# Patient Record
Sex: MALE | Race: WHITE | NOT HISPANIC OR LATINO | Employment: OTHER | URBAN - METROPOLITAN AREA
[De-identification: names, ages, dates, MRNs, and addresses within clinical notes are randomized per-mention and may not be internally consistent; named-entity substitution may affect disease eponyms.]

---

## 2017-01-04 ENCOUNTER — PATIENT MESSAGE (OUTPATIENT)
Dept: UROLOGY | Facility: CLINIC | Age: 62
End: 2017-01-04

## 2017-02-20 ENCOUNTER — LAB VISIT (OUTPATIENT)
Dept: LAB | Facility: HOSPITAL | Age: 62
End: 2017-02-20
Attending: UROLOGY
Payer: COMMERCIAL

## 2017-02-20 DIAGNOSIS — C61 PROSTATE CANCER: ICD-10-CM

## 2017-02-20 LAB — COMPLEXED PSA SERPL-MCNC: 1.4 NG/ML

## 2017-02-20 PROCEDURE — 36415 COLL VENOUS BLD VENIPUNCTURE: CPT

## 2017-02-20 PROCEDURE — 84153 ASSAY OF PSA TOTAL: CPT

## 2017-02-21 ENCOUNTER — PATIENT MESSAGE (OUTPATIENT)
Dept: UROLOGY | Facility: CLINIC | Age: 62
End: 2017-02-21

## 2017-03-08 ENCOUNTER — OFFICE VISIT (OUTPATIENT)
Dept: UROLOGY | Facility: CLINIC | Age: 62
End: 2017-03-08
Payer: COMMERCIAL

## 2017-03-08 VITALS
DIASTOLIC BLOOD PRESSURE: 80 MMHG | TEMPERATURE: 98 F | BODY MASS INDEX: 26.36 KG/M2 | SYSTOLIC BLOOD PRESSURE: 124 MMHG | WEIGHT: 178 LBS | HEIGHT: 69 IN | HEART RATE: 60 BPM

## 2017-03-08 DIAGNOSIS — Z85.46 HISTORY OF PROSTATE CANCER: Primary | ICD-10-CM

## 2017-03-08 LAB
BILIRUB SERPL-MCNC: NORMAL MG/DL
BLOOD URINE, POC: NORMAL
COLOR, POC UA: NORMAL
GLUCOSE UR QL STRIP: NORMAL
KETONES UR QL STRIP: NORMAL
LEUKOCYTE ESTERASE URINE, POC: NORMAL
NITRITE, POC UA: NORMAL
PH, POC UA: 8
PROTEIN, POC: NORMAL
SPECIFIC GRAVITY, POC UA: 1.01
UROBILINOGEN, POC UA: NORMAL

## 2017-03-08 PROCEDURE — 3079F DIAST BP 80-89 MM HG: CPT | Mod: S$GLB,,, | Performed by: UROLOGY

## 2017-03-08 PROCEDURE — 99999 PR PBB SHADOW E&M-EST. PATIENT-LVL III: CPT | Mod: PBBFAC,,, | Performed by: UROLOGY

## 2017-03-08 PROCEDURE — 1160F RVW MEDS BY RX/DR IN RCRD: CPT | Mod: S$GLB,,, | Performed by: UROLOGY

## 2017-03-08 PROCEDURE — 3074F SYST BP LT 130 MM HG: CPT | Mod: S$GLB,,, | Performed by: UROLOGY

## 2017-03-08 PROCEDURE — 99213 OFFICE O/P EST LOW 20 MIN: CPT | Mod: 25,S$GLB,, | Performed by: UROLOGY

## 2017-03-08 PROCEDURE — 81002 URINALYSIS NONAUTO W/O SCOPE: CPT | Mod: S$GLB,,, | Performed by: UROLOGY

## 2017-03-08 NOTE — MR AVS SNAPSHOT
"    [facility]   [address]  [place] [address]  Phone: [phone]                  Edwin Sepulveda III   3/8/2017 9:45 AM   Office Visit    Description:  Male : 1955   Provider:  Adin Santos MD   Department:  [place] [facility]           Reason for Visit     6 mth recheck                To Do List           Future Appointments        Provider Department Dept Phone    2017 8:30 AM [phone]      Goals (5 Years of Data)     None      [facility]     [facility] Care [facility] -  Assistance  Registered nurses in the [facility] Center provide clinical advisement, health education, appointment booking, and other advisory services.  Call for this free service at [phone].             Medications           Message regarding Medications     Verify the changes and/or additions to your medication regime listed below are the same as discussed with your clinician today.  If any of these changes or additions are incorrect, please notify your healthcare provider.             Verify that the below list of medications is an accurate representation of the medications you are currently taking.  If none reported, the list may be blank. If incorrect, please contact your healthcare provider. Carry this list with you in case of emergency.           Current Medications     vitamin D 1000 units Tab Take 1,000 mg by mouth once daily.    calcium carbonate (TUMS) 200 mg calcium (500 mg) chewable tablet Take 1 tablet by mouth daily as needed for Heartburn.           Clinical Reference Information           Your Vitals Were     BP Pulse Temp Height Weight BMI    124/80 (BP Location: Right arm, Patient Position: Sitting, BP Method: Automatic) 60 97.8 °F (36.6 °C) (Oral) 5' 9" (1.753 m) 80.7 kg (178 lb) 26.29 kg/m2      Blood Pressure          Most Recent Value    BP  124/80      Allergies as of 3/8/2017     Penicillins    "   Immunizations Administered on Date of Encounter - 3/8/2017     None      Language Assistance Services     ATTENTION: Language assistance services are available, free of charge. Please call [phone].      ATENCIÓN: Si habla catalina, tiene a benavides disposición servicios gratuitos de asistencia lingüística. Llame al [phone].     CHÚ Ý: N?u b?n nói Ti?ng Vi?t, có các d?ch v? h? tr? ngôn ng? mi?n phí dành cho b?n. G?i s? [phone].         [facility] complies with applicable Federal civil rights laws and does not discriminate on the basis of race, color, national origin, age, disability, or sex.

## 2017-03-08 NOTE — PROGRESS NOTES
Subjective:       Patient ID: Edwin Sepulveda III is a 62 y.o. male.    Chief Complaint:   OFFICE NOTE    CHIEF COMPLAINT:  Prostate cancer.    HISTORY OF PRESENT ILLNESS:  Mr. Sepulveda is a 62-year-old male who was diagnosed   of having prostate cancer and underwent radiotherapy with external beam   radiation completed on 01/30/2014.  The patient is here for his semiannual   evaluation.  At the present time, the patient is free of any  issues.  The   patient has nocturia x1, good urinary flow.  Denies hematuria.  Denies dysuria   and he feels that he can empty the bladder satisfactorily.  Bowel movements are   adequate with no evidence of any blood in the stool and he is regular with his   bowel movements.  The patient denies weight loss or bone pain.  It is to be   noted that the last PSA on February 2017 was 1.4.  The previous one on August 2016, six months before was 0.75.    The urinalysis today is clear.    I had a lengthy discussion regarding his elevated PSA that has doubled the   number in six months.  I explained to him that this is a little bit concerning,   but still is within range and my suggestion for him was to wait and repeat his   PSA in the next six months.  He agreed and he is not concerned of waiting six   months to get another reading and see if he has continues raising on his PSA or   worse flip on the PSA level.    It is to be noted that the urinalysis today is clear.    The past medical history, the past surgical history, the current medications and   the allergies are well documented in the medical record.  They were reviewed   during this visit.  The family history was also reviewed.      EOR/PN  dd: 03/08/2017 13:11:48 (CST)  td: 03/08/2017 20:28:27 (CST)  Doc ID   #[id number]  Job ID #[id number]    CC:       HPI  Review of Systems   Constitutional: Negative for activity change and appetite change.   HENT: Negative.    Eyes: Negative for discharge.   Respiratory: Negative for cough and  shortness of breath.    Cardiovascular: Negative for chest pain and palpitations.   Gastrointestinal: Negative for abdominal distention, abdominal pain, constipation and vomiting.   Genitourinary: Negative for discharge, dysuria, flank pain, frequency, hematuria, testicular pain and urgency.   Musculoskeletal: Negative for arthralgias.   Skin: Negative for rash.   Neurological: Negative for dizziness.   Psychiatric/Behavioral: The patient is not nervous/anxious.        Objective:      Physical Exam   Constitutional: He appears well-developed and well-nourished.   HENT:   Head: Normocephalic.   Eyes: Pupils are equal, round, and reactive to light.   Neck: Normal range of motion.   Cardiovascular: Normal rate.    Pulmonary/Chest: Effort normal.   Abdominal: Soft. He exhibits no distension and no mass. There is no tenderness.   Genitourinary: Rectum normal, prostate normal and penis normal. Rectal exam shows no external hemorrhoid, no mass and no tenderness. Prostate is not enlarged and not tender. Right testis shows no mass and no tenderness. Left testis shows no mass and no tenderness. No discharge found.       Musculoskeletal: Normal range of motion.   Neurological: He is alert.   Skin: Skin is warm.     Psychiatric: He has a normal mood and affect.       Assessment:       1. History of prostate cancer        Plan:       History of prostate cancer  -     POCT URINE DIPSTICK WITHOUT MICROSCOPE    Repeat PSA in 6 mo

## 2017-03-20 ENCOUNTER — DOCUMENTATION ONLY (OUTPATIENT)
Dept: FAMILY MEDICINE | Facility: CLINIC | Age: 62
End: 2017-03-20

## 2017-03-20 ENCOUNTER — HOSPITAL ENCOUNTER (OUTPATIENT)
Dept: RADIOLOGY | Facility: CLINIC | Age: 62
Discharge: HOME | End: 2017-03-20
Attending: FAMILY MEDICINE
Payer: COMMERCIAL

## 2017-03-20 ENCOUNTER — PATIENT MESSAGE (OUTPATIENT)
Dept: FAMILY MEDICINE | Facility: CLINIC | Age: 62
End: 2017-03-20

## 2017-03-20 ENCOUNTER — OFFICE VISIT (OUTPATIENT)
Dept: FAMILY MEDICINE | Facility: CLINIC | Age: 62
End: 2017-03-20
Payer: COMMERCIAL

## 2017-03-20 VITALS
DIASTOLIC BLOOD PRESSURE: 72 MMHG | SYSTOLIC BLOOD PRESSURE: 127 MMHG | WEIGHT: 183 LBS | HEART RATE: 71 BPM | HEIGHT: 69 IN | BODY MASS INDEX: 27.11 KG/M2 | TEMPERATURE: 98 F

## 2017-03-20 DIAGNOSIS — M54.2 NECK PAIN: ICD-10-CM

## 2017-03-20 DIAGNOSIS — M54.2 NECK PAIN: Primary | ICD-10-CM

## 2017-03-20 PROCEDURE — 3078F DIAST BP <80 MM HG: CPT | Mod: S$GLB,,, | Performed by: PHYSICIAN ASSISTANT

## 2017-03-20 PROCEDURE — 99999 PR PBB SHADOW E&M-EST. PATIENT-LVL III: CPT | Mod: PBBFAC,,, | Performed by: PHYSICIAN ASSISTANT

## 2017-03-20 PROCEDURE — 3074F SYST BP LT 130 MM HG: CPT | Mod: S$GLB,,, | Performed by: PHYSICIAN ASSISTANT

## 2017-03-20 PROCEDURE — 72050 X-RAY EXAM NECK SPINE 4/5VWS: CPT | Mod: TC,PO

## 2017-03-20 PROCEDURE — 72050 X-RAY EXAM NECK SPINE 4/5VWS: CPT | Mod: 26,,, | Performed by: RADIOLOGY

## 2017-03-20 PROCEDURE — 1160F RVW MEDS BY RX/DR IN RCRD: CPT | Mod: S$GLB,,, | Performed by: PHYSICIAN ASSISTANT

## 2017-03-20 PROCEDURE — 99213 OFFICE O/P EST LOW 20 MIN: CPT | Mod: S$GLB,,, | Performed by: PHYSICIAN ASSISTANT

## 2017-03-20 RX ORDER — CYCLOBENZAPRINE HCL 10 MG
10 TABLET ORAL 3 TIMES DAILY PRN
Qty: 30 TABLET | Refills: 0 | Status: SHIPPED | OUTPATIENT
Start: 2017-03-20 | End: 2017-03-30

## 2017-03-20 NOTE — PROGRESS NOTES
Pre-Visit Chart Review  For Appointment Scheduled on 03/20/2017      Health Maintenance Due   Topic Date Due    TETANUS VACCINE  02/08/1972    Zoster Vaccine  02/08/2014    Influenza Vaccine  08/01/2016

## 2017-03-20 NOTE — MR AVS SNAPSHOT
[facility]  [address] [place] [address]  Phone: [phone]  Fax: [phone]                  Edwin Sepulveda III   3/20/2017 2:40 PM   Office Visit    Description:  Male : 1955   Provider:  JACKELIN Meyers   Department:  [facility]           Reason for Visit     Neck Pain           Diagnoses this Visit        Comments    Neck pain    -  Primary            To Do List           Future Appointments        Provider Department Dept Phone    2017 8:30 AM MD [place] [facility] [phone]      Goals (5 Years of Data)     None       These Medications        Disp Refills Start End    cyclobenzaprine (FLEXERIL) 10 MG tablet 30 tablet 0 3/20/2017 3/30/2017    Take 1 tablet (10 mg total) by mouth 3 (three) times daily as needed for Muscle spasms. - Oral    Pharmacy: [facility] [address]  [facility] Ph #: [phone]         [facility]     [facility] On Call Nurse Care Line -  Assistance  Registered nurses in the [facility] On Call Center provide clinical advisement, health education, appointment booking, and other advisory services.  Call for this free service at [phone].             Medications           Message regarding Medications     Verify the changes and/or additions to your medication regime listed below are the same as discussed with your clinician today.  If any of these changes or additions are incorrect, please notify your healthcare provider.        START taking these NEW medications        Refills    cyclobenzaprine (FLEXERIL) 10 MG tablet 0    Sig: Take 1 tablet (10 mg total) by mouth 3 (three) times daily as needed for Muscle spasms.    Class: Normal    Route: Oral           Verify that the below list of medications is an accurate representation of the medications you are currently taking.  If none reported, the list may be blank. If incorrect, please contact your  "healthcare provider. Carry this list with you in case of emergency.           Current Medications     calcium carbonate (TUMS) 200 mg calcium (500 mg) chewable tablet Take 1 tablet by mouth daily as needed for Heartburn.    vitamin D 1000 units Tab Take 1,000 mg by mouth once daily.    cyclobenzaprine (FLEXERIL) 10 MG tablet Take 1 tablet (10 mg total) by mouth 3 (three) times daily as needed for Muscle spasms.           Clinical Reference Information           Your Vitals Were     BP Pulse Temp Height Weight BMI    127/72 (BP Location: Right arm, Patient Position: Sitting, BP Method: Automatic) 71 97.7 °F (36.5 °C) (Oral) 5' 9" (1.753 m) 83 kg (182 lb 15.7 oz) 27.02 kg/m2      Blood Pressure          Most Recent Value    BP  127/72      Allergies as of 3/20/2017     Penicillins      Immunizations Administered on Date of Encounter - 3/20/2017     None      Orders Placed During Today's Visit     Future Labs/Procedures Expected by Expires    X-Ray Cervical Spine Complete 5 view  3/20/2017 3/20/2018      Language Assistance Services     ATTENTION: Language assistance services are available, free of charge. Please call [phone].      ATENCIÓN: Si habla catalina, tiene a benavides disposición servicios gratuitos de asistencia lingüística. Llame al [phone].     JOANNE Ý: N?u b?n nói Ti?ng Vi?t, có các d?ch v? h? tr? ngôn ng? mi?n phí dành cho b?n. G?i s? [phone].         [facility] complies with applicable Federal civil rights laws and does not discriminate on the basis of race, color, national origin, age, disability, or sex.        "

## 2017-03-20 NOTE — PROGRESS NOTES
Edwin Sepulveda is a 62 year old male who presents with neck pain that began about 2 weeks ago. He woke up with the pain one morning. The pain is dull in nature but can be sharp at times. The pain is a 8/10 at its worst. Denies any accidents or trauma. Has tried Aleve, heat packs, and Bengay with minimal improving.      Review of Systems   Constitutional: Negative for sweats, fatigue and fever.  Respiratory: Negative for cough, chest tightness, shortness of breath and wheezing.   Cardiovascular: Negative for chest pain, palpitations and leg swelling.   Gastrointestinal: Negative for nausea, vomiting, and diarrhea.   MSK: Positive for neck pain.        Objective:      Vitals:     03/20/17 1421   BP: 127/72   Pulse: 71   Temp: 97.7 °F (36.5 °C)         Physical Exam   Constitutional: He appears well-developed.  Head: Normocephalic and atraumatic.   Mouth/Throat: Oropharynx is clear and moist. No oropharyngeal exudate.   Eyes: Pupils are equal, round, and reactive to light.  Neck: Limited range of motion turning to left and right. No C-spine tenderness.   Cardiovascular: Normal rate, regular rhythm and normal heart sounds.No murmur heard.  Pulmonary/Chest: Effort normal and breath sounds normal. No respiratory distress. He has no wheezes.  He has no cervical adenopathy.   Psychiatric: He has a normal mood and affect. His behavior is normal. Judgment and thought content normal.      Assessment:  1. Neck Strain     Plan:  Neck Strain  -moist heat packs  -Aleve OTC  -cyclobenzaprine (FLEXERIL) 10 MG tablet      Edwin was seen today for neck pain.    Diagnoses and all orders for this visit:    Neck pain  -     cyclobenzaprine (FLEXERIL) 10 MG tablet; Take 1 tablet (10 mg total) by mouth 3 (three) times daily as needed for Muscle spasms.  -     X-Ray Cervical Spine Complete 5 view; Future

## 2017-03-20 NOTE — MEDICAL/APP STUDENT
Subjective:    Edwin Sepulveda is a 62 year old male who presents with neck pain that began about 2 weeks ago. He woke up with the pain one morning. The pain is dull in nature but can be sharp at times. The pain is a 8/10 at its worst. Denies any accidents or trauma. Has tried Aleve, heat packs, and Bengay with minimal improving.     Review of Systems   Constitutional: Negative for sweats, fatigue and fever.  Respiratory: Negative for cough, chest tightness, shortness of breath and wheezing.   Cardiovascular: Negative for chest pain, palpitations and leg swelling.   Gastrointestinal: Negative for nausea, vomiting, and diarrhea.   MSK: Positive for neck pain.       Objective:  Vitals:    03/20/17 1421   BP: 127/72   Pulse: 71   Temp: 97.7 °F (36.5 °C)       Physical Exam   Constitutional: He appears well-developed.  Head: Normocephalic and atraumatic.   Mouth/Throat: Oropharynx is clear and moist. No oropharyngeal exudate.   Eyes: Pupils are equal, round, and reactive to light.  Neck: Limited range of motion turning to left and right. No C-spine tenderness.   Cardiovascular: Normal rate, regular rhythm and normal heart sounds.No murmur heard.  Pulmonary/Chest: Effort normal and breath sounds normal. No respiratory distress. He has no wheezes.  He has no cervical adenopathy.   Psychiatric: He has a normal mood and affect. His behavior is normal. Judgment and thought content normal.     Assessment:  1. Neck Strain    Plan:  Neck Strain  -moist heat packs  -Aleve OTC  -cyclobenzaprine (FLEXERIL) 10 MG tablet

## 2017-05-24 ENCOUNTER — PATIENT MESSAGE (OUTPATIENT)
Dept: UROLOGY | Facility: CLINIC | Age: 62
End: 2017-05-24

## 2017-06-13 ENCOUNTER — PATIENT MESSAGE (OUTPATIENT)
Dept: ENDOCRINOLOGY | Facility: CLINIC | Age: 62
End: 2017-06-13

## 2017-06-14 ENCOUNTER — PATIENT MESSAGE (OUTPATIENT)
Dept: UROLOGY | Facility: CLINIC | Age: 62
End: 2017-06-14

## 2017-08-21 ENCOUNTER — LAB VISIT (OUTPATIENT)
Dept: LAB | Facility: HOSPITAL | Age: 62
End: 2017-08-21
Attending: UROLOGY
Payer: COMMERCIAL

## 2017-08-21 DIAGNOSIS — Z85.46 HISTORY OF PROSTATE CANCER: ICD-10-CM

## 2017-08-21 LAB — COMPLEXED PSA SERPL-MCNC: 0.94 NG/ML

## 2017-08-21 PROCEDURE — 84153 ASSAY OF PSA TOTAL: CPT

## 2017-08-21 PROCEDURE — 36415 COLL VENOUS BLD VENIPUNCTURE: CPT | Mod: PO

## 2017-08-24 ENCOUNTER — OFFICE VISIT (OUTPATIENT)
Dept: RADIATION ONCOLOGY | Facility: CLINIC | Age: 62
End: 2017-08-24
Payer: COMMERCIAL

## 2017-08-24 VITALS
SYSTOLIC BLOOD PRESSURE: 122 MMHG | RESPIRATION RATE: 18 BRPM | BODY MASS INDEX: 27.02 KG/M2 | DIASTOLIC BLOOD PRESSURE: 75 MMHG | WEIGHT: 183 LBS | HEART RATE: 61 BPM

## 2017-08-24 DIAGNOSIS — C61 PROSTATE CANCER: Primary | ICD-10-CM

## 2017-08-24 PROCEDURE — 3074F SYST BP LT 130 MM HG: CPT | Mod: ,,, | Performed by: RADIOLOGY

## 2017-08-24 PROCEDURE — 3008F BODY MASS INDEX DOCD: CPT | Mod: ,,, | Performed by: RADIOLOGY

## 2017-08-24 PROCEDURE — 99215 OFFICE O/P EST HI 40 MIN: CPT | Mod: ,,, | Performed by: RADIOLOGY

## 2017-08-24 PROCEDURE — 3078F DIAST BP <80 MM HG: CPT | Mod: ,,, | Performed by: RADIOLOGY

## 2017-08-24 NOTE — PROGRESS NOTES
Edwin TERRY [facility]  [id number]  1955 8/24/2017  Adin Santos Md  [address]  [place] [address]    DIAGNOSIS: low risk prostate adenoca, cT1cN0 3+3 iPSA 6.7    REASON FOR VISIT: Routine scheduled follow-up.    HISTORY OF PRESENT ILLNESS:   This 62-year-old male originally seen for elevated PSA level that he reports was as high as 9, never crossed 10 but prior records reach 6.7. He underwent TRUS biopsy that revealed 7 out of 12 cores with the disease predominantly on the right side positive for adenocarcinoma Courtland score 3+3 = 6. Bone scan as well as CT of the abdomen and pelvis were negative and he was staged as low risk prostate adenocarcinoma.    He completed radiation therapy alone to a dose of 7560 cGy to the prostate on 01/30/2015 using and I am her T technique. He tolerated therapy very well. During his last week of therapy at PSA returned 4.3    INTERVAL HISTORY:   PSA  1/15 4.3  3/15 1.6  8/15 0.88  2/16 1.2  8/16 0.75  2/17 1.4  8/17 0.94    At today's visit patient is denying frequency, urgency, dysuria, hematuria, bone pain. He has nocturia ×1. No bright red blood per rectum no diarrhea. He is had 1 previous colonoscopy but no colonoscopy since completion of his radiotherapy.    Review of Systems   Constitutional: Negative for appetite change, chills, fever and unexpected weight change.   HENT:   Negative for lump/mass, mouth sores, sore throat, trouble swallowing and voice change.    Eyes: Negative for eye problems and icterus.   Respiratory: Negative for chest tightness, cough, hemoptysis and shortness of breath.    Cardiovascular: Negative for chest pain and leg swelling.   Gastrointestinal: Negative for abdominal distention, abdominal pain, blood in stool, constipation, diarrhea, nausea and vomiting.   Genitourinary: Negative for bladder incontinence, difficulty urinating, dysuria, frequency, hematuria and nocturia.    Musculoskeletal: Negative for back pain, gait problem, neck  pain and neck stiffness.   Neurological: Negative for extremity weakness, gait problem, headaches, numbness and seizures.   Hematological: Negative for adenopathy.     Past Medical History:   Diagnosis Date    Family history of malignant neoplasm of prostate 8/14/2011    Gastroesophageal reflux disease with hiatal hernia     History of prostate cancer 1/7/2016    Hyperlipidemia     Kidney stones     Multinodular goiter     no medications    Prostate cancer 10/20/2014    Prostate disorder     Dr Santos      Past Surgical History:   Procedure Laterality Date    APPENDECTOMY      COLONOSCOPY  3/26/2008    5-10 year recheck per Dr. Borja (normal exam)    esphogus scope      leg repair Left     ORCHIECTOMY      spermatocele repair       Social History     Social History    Marital status:      Spouse name: N/A    Number of children: N/A    Years of education: N/A     Social History Main Topics    Smoking status: Former Smoker     Quit date: 1/23/1976    Smokeless tobacco: None    Alcohol use 1.8 oz/week     3 Glasses of wine per week    Drug use: No    Sexual activity: Yes     Partners: Female     Other Topics Concern    None     Social History Narrative    None     Family History   Problem Relation Age of Onset    Hypertension Mother     COPD Father     Cancer Father      prostate    Hypertension Sister     Diabetes Sister     Hyperlipidemia Sister     Cancer Maternal Grandmother     Cancer Paternal Grandfather      prostate     Medication List with Changes/Refills   Current Medications    CALCIUM CARBONATE (TUMS) 200 MG CALCIUM (500 MG) CHEWABLE TABLET    Take 1 tablet by mouth daily as needed for Heartburn.    VITAMIN D 1000 UNITS TAB    Take 1,000 mg by mouth once daily.     Review of patient's allergies indicates:   Allergen Reactions    Penicillins Rash     Other reaction(s): Rash       QUALITY OF LIFE: 100%- Normal, No Complaints, No Evidence of Disease    Vitals:     08/24/17 0853   BP: 122/75   Pulse: 61   Resp: 18   Weight: 83 kg (183 lb)   PainSc: 0-No pain       PHYSICAL EXAM:   GENERAL: alert; in no apparent distress.   HEAD: normocephalic, atraumatic.  EYES: pupils are equal, round, reactive to light and accommodation. Sclera anicteric. Conjunctiva not injected.   NOSE/THROAT: no nasal erythema or rhinorrhea. Oropharynx pink, without erythema, ulcerations or thrush.   NECK: no cervical motion rigidity; supple with no masses.  CHEST: Patient is speaking comfortably on room air with normal work of breathing without using accessory muscles of respiration.  ABDOMEN: soft, nontender, nondistended. Bowel sounds present.   MUSCULOSKELETAL: no tenderness to palpation along the spine or scapulae. Normal range of motion.  NEUROLOGIC: cranial nerves II-XII intact bilaterally. Strength 5/5 in bilateral upper and lower extremities. No sensory deficits appreciated.  Normal gait.   EXTREMITIES: no clubbing, cyanosis, edema.  SKIN: no erythema, rashes, ulcerations noted.   RECTAL: deferred    ANCILLARY DATA: PSA 8/17 0.94    ASSESSMENT: 62M with  low risk prostate adenoca, cT1cN0 3+3 iPSA 6.7 s/p RT alone to 7560cGy ending 1/15.  PLAN:  Mr. Sepulveda continues to have a sustained PSA response, with most recent level of 0.94. In reviewing his record I see that his PSA level went as low as 0.75, which sets his Phoenix failure definition as 2 points above that wandy or 2.75. By review of systems and physical exam at today's visit he has no evidence of disease and he is reporting no ill sequelae from his radiotherapy. Today we had an extensive discussion about the risk and benefits of testosterone supplementation as he reports his most recent lab assessment revealed him to be at the low end of normal. He is continuing to follow with Dr. Santos and we will continue to follow him on a yearly basis until he reaches 5 years from his radiotherapy.    All questions answered and contact information  provided. Patient understands free to call us anytime with any questions or concerns regarding radiation therapy.    TIME SPENT WITH PATIENT: I have personally seen and evaluated this patient. Approximately 30 minutes were spent with the patient discussing follow-up careplan.     PHYSICIAN: Kelvin Garner Jr, MD

## 2017-09-11 ENCOUNTER — OFFICE VISIT (OUTPATIENT)
Dept: UROLOGY | Facility: CLINIC | Age: 62
End: 2017-09-11
Payer: COMMERCIAL

## 2017-09-11 VITALS
HEART RATE: 63 BPM | BODY MASS INDEX: 26.66 KG/M2 | WEIGHT: 180 LBS | HEIGHT: 69 IN | TEMPERATURE: 98 F | DIASTOLIC BLOOD PRESSURE: 86 MMHG | SYSTOLIC BLOOD PRESSURE: 145 MMHG

## 2017-09-11 DIAGNOSIS — Z85.46 HISTORY OF PROSTATE CANCER: Primary | ICD-10-CM

## 2017-09-11 LAB
BILIRUB SERPL-MCNC: NORMAL MG/DL
BLOOD URINE, POC: NORMAL
COLOR, POC UA: NORMAL
GLUCOSE UR QL STRIP: NORMAL
KETONES UR QL STRIP: NORMAL
LEUKOCYTE ESTERASE URINE, POC: NORMAL
NITRITE, POC UA: NORMAL
PH, POC UA: 6
PROTEIN, POC: NORMAL
SPECIFIC GRAVITY, POC UA: 1.02
UROBILINOGEN, POC UA: NORMAL

## 2017-09-11 PROCEDURE — 3077F SYST BP >= 140 MM HG: CPT | Mod: S$GLB,,, | Performed by: UROLOGY

## 2017-09-11 PROCEDURE — 99214 OFFICE O/P EST MOD 30 MIN: CPT | Mod: 25,S$GLB,, | Performed by: UROLOGY

## 2017-09-11 PROCEDURE — 3079F DIAST BP 80-89 MM HG: CPT | Mod: S$GLB,,, | Performed by: UROLOGY

## 2017-09-11 PROCEDURE — 81002 URINALYSIS NONAUTO W/O SCOPE: CPT | Mod: S$GLB,,, | Performed by: UROLOGY

## 2017-09-11 PROCEDURE — 3008F BODY MASS INDEX DOCD: CPT | Mod: S$GLB,,, | Performed by: UROLOGY

## 2017-09-11 PROCEDURE — 99999 PR PBB SHADOW E&M-EST. PATIENT-LVL III: CPT | Mod: PBBFAC,,, | Performed by: UROLOGY

## 2017-09-11 NOTE — PROGRESS NOTES
Subjective:       Patient ID: Edwin Sepulveda III is a 62 y.o. male.    Chief Complaint:   OFFICE NOTE    CHIEF COMPLAINT:  History of prostate cancer.    HISTORY OF PRESENT ILLNESS:  Mr. Sepulveda is a 62-year-old male who on   2014, completed radiation for prostate cancer.  Since then, his PSA has   been fairly stable and the patient is here for his annual prostate evaluation.    The patient refers that he does not have any urinary issues with nocturia x1.    No dysuria, no hematuria and no difficulty with bowel movements.  He feels like   he empties the bladder satisfactory and the flow is adequate.  At the present   time, he is taking no medications for the  tract.    FAMILY HISTORY:  Significant.  His father suffered of prostate cancer, was   treated with LHRH agonist and he  from COPD.    PAST MEDICAL AND SURGICAL HISTORY:  On Mr. Sepulveda is well documented in the   medical record, and the patient has no changes on the medical or surgical   history in the last six months.    In the review also the medications and allergies were completed.    The urinalysis today is completely clear.  The last PSA was on 2017, 0.94.      EOR/HN  dd: 2017 09:23:01 (CDT)  td: 2017 18:37:26 (CDT)  Doc ID   #[id number]  Job ID #[id number]    CC:       HPI  Review of Systems   Constitutional: Negative for activity change and appetite change.   HENT: Negative.    Eyes: Negative for discharge.   Respiratory: Negative for cough and shortness of breath.    Cardiovascular: Negative for chest pain and palpitations.   Gastrointestinal: Negative for abdominal distention, abdominal pain, constipation and vomiting.   Genitourinary: Negative for discharge, dysuria, flank pain, frequency, hematuria, testicular pain and urgency.   Musculoskeletal: Negative for arthralgias.   Skin: Negative for rash.   Neurological: Negative for dizziness.   Psychiatric/Behavioral: The patient is not nervous/anxious.        Objective:       Physical Exam   Constitutional: He appears well-developed and well-nourished.   HENT:   Head: Normocephalic.   Eyes: Pupils are equal, round, and reactive to light.   Neck: Normal range of motion.   Cardiovascular: Normal rate.    Pulmonary/Chest: Effort normal.   Abdominal: Soft. He exhibits no distension and no mass. There is no tenderness.   Genitourinary: Rectum normal and penis normal. Rectal exam shows no external hemorrhoid, no mass and no tenderness. Prostate is not enlarged and not tender. Right testis shows no mass and no tenderness. Left testis shows no mass and no tenderness. No discharge found.   Musculoskeletal: Normal range of motion.   Neurological: He is alert.   Skin: Skin is warm.     Psychiatric: He has a normal mood and affect.       Assessment:       1. History of prostate cancer        Plan:       History of prostate cancer  -     POCT URINE DIPSTICK WITHOUT MICROSCOPE  -     PROSTATE SPECIFIC ANTIGEN, DIAGNOSTIC; Future; Expected date: 09/11/2017    RTC in 6 mo with PSA

## 2017-11-06 ENCOUNTER — HOSPITAL ENCOUNTER (OUTPATIENT)
Dept: RADIOLOGY | Facility: CLINIC | Age: 62
Discharge: HOME | End: 2017-11-06
Attending: INTERNAL MEDICINE
Payer: COMMERCIAL

## 2017-11-06 DIAGNOSIS — E04.2 MULTINODULAR GOITER: ICD-10-CM

## 2017-11-06 DIAGNOSIS — T14.8XXA BRUISING: ICD-10-CM

## 2017-11-06 PROCEDURE — 76536 US EXAM OF HEAD AND NECK: CPT | Mod: 26,,, | Performed by: RADIOLOGY

## 2017-11-06 PROCEDURE — 76536 US EXAM OF HEAD AND NECK: CPT | Mod: TC,PO

## 2017-11-13 ENCOUNTER — OFFICE VISIT (OUTPATIENT)
Dept: ENDOCRINOLOGY | Facility: CLINIC | Age: 62
End: 2017-11-13
Payer: COMMERCIAL

## 2017-11-13 VITALS
HEART RATE: 62 BPM | HEIGHT: 69 IN | DIASTOLIC BLOOD PRESSURE: 80 MMHG | BODY MASS INDEX: 27.3 KG/M2 | WEIGHT: 184.31 LBS | SYSTOLIC BLOOD PRESSURE: 130 MMHG

## 2017-11-13 DIAGNOSIS — E04.2 MULTINODULAR GOITER: Primary | ICD-10-CM

## 2017-11-13 PROCEDURE — 99999 PR PBB SHADOW E&M-EST. PATIENT-LVL III: CPT | Mod: PBBFAC,,, | Performed by: INTERNAL MEDICINE

## 2017-11-13 PROCEDURE — 99213 OFFICE O/P EST LOW 20 MIN: CPT | Mod: S$GLB,,, | Performed by: INTERNAL MEDICINE

## 2017-11-13 NOTE — PROGRESS NOTES
CHIEF COMPLAINT: Multinodular goiter  62 -year-old male here for followup. Benign L FNA in 2013. Has done radiation for Prostate Ca. Taking 2000 daily of Vit D. No difficulty swallowing. No palpitations. No tremors. No change in voice.           PAST MEDICAL HISTORY: Hypertension, hyperlipidemia, kidney stone, multinodular goiter, GERD. Prostate CA    PAST SURGICAL HISTORY: Right orchiectomy, appendectomy    SOCIAL HISTORY: Does not smoke or drink. No children. Plays [employer] in [employer]    FAMILY HISTORY: Prostate cancer    MEDICATIONS/ALLERGIES: The patient's MedCard has been updated and reviewed.         ROS:   Constitutional: weight stable.   Eyes: No recent visual changes  ENT: No dysphagia  Cardiovascular: Denies current anginal symptoms  Respiratory: Denies current respiratory difficulty  Gastrointestinal: Denies recent bowel disturbances  GenitoUrinary -no hematuria. Nocturia x 1.   Skin: States has been bruising.   Neurologic: No focal neurologic complaints     PE:  GENERAL: Well developed, well nourished  EYES: Anicteric, symmetrical pupils  NECK: Supple neck, left nodule palpable  LYMPHATIC: No cervical or supraclavicular lymphadenopathy  CARDIOVASCULAR: Normal heart sounds, no pedal edema  RESPIRATORY: Normal effort, clear to auscultation    Results for MARYLOUBRANDEE JONATHAN TERRY III (MRN [id number]) as of 11/13/2017 14:02   Ref. Range 11/6/2017 10:20   TSH Latest Ref Range: 0.400 - 4.000 uIU/mL 1.464   THYROID US:  Sonographic evaluation of the thyroid gland was performed and compared to 11/7/16    Findings: The thyroid gland is enlarged with the right lobe measuring 4.7 x 1.2 x 1.6 cm and the left 6.0 x 3.4 x 2.6 cm. The thyroid isthmus measures 5 mm. Total thyroid volume is 32.5 cc compared with 31.5 cc previously.    The left thyroid lobe demonstrates 2 large solid nodules, both within the lower pole and the larger measuring 3.7 cm compared to 3.8 cm previously. The smaller measures 2.0 cm compared to 0.3 cm  previously.    The right thyroid lobe contains a few scattered subcentimeter nodules as before.   Impression      Enlarged, multinodular thyroid gland without significant change. 2 solid nodules on the left meet criteria for fine needle aspiration if this has not been previously performed.             ASSESSMENT/PLAN:  1. Multinodular goiter- has had negative biopsies in the past. Independently reviewed US images from 2013, 2016, 2017. Recent reports states that the smaller left nodule was 2.0 cm compared to 0.3 cm previously. This appears to be a typo. When reviewing the US images the nodules have not significantly changed in size.     2. Vit D deficiency- Continue 2000 a day of OTC Vit D    FOLLOWUP  F/U 1 year with TSH, Thyroid US

## 2018-02-25 ENCOUNTER — PATIENT MESSAGE (OUTPATIENT)
Dept: UROLOGY | Facility: CLINIC | Age: 63
End: 2018-02-25

## 2018-03-08 ENCOUNTER — PATIENT MESSAGE (OUTPATIENT)
Dept: RADIATION ONCOLOGY | Facility: CLINIC | Age: 63
End: 2018-03-08

## 2018-03-08 ENCOUNTER — LAB VISIT (OUTPATIENT)
Dept: LAB | Facility: HOSPITAL | Age: 63
End: 2018-03-08
Attending: UROLOGY
Payer: COMMERCIAL

## 2018-03-08 DIAGNOSIS — Z85.46 HISTORY OF PROSTATE CANCER: ICD-10-CM

## 2018-03-08 LAB — COMPLEXED PSA SERPL-MCNC: 0.7 NG/ML

## 2018-03-08 PROCEDURE — 84153 ASSAY OF PSA TOTAL: CPT

## 2018-03-08 PROCEDURE — 36415 COLL VENOUS BLD VENIPUNCTURE: CPT | Mod: PO

## 2018-03-12 ENCOUNTER — OFFICE VISIT (OUTPATIENT)
Dept: UROLOGY | Facility: CLINIC | Age: 63
End: 2018-03-12
Payer: COMMERCIAL

## 2018-03-12 VITALS
HEART RATE: 69 BPM | WEIGHT: 186.06 LBS | DIASTOLIC BLOOD PRESSURE: 77 MMHG | BODY MASS INDEX: 27.56 KG/M2 | TEMPERATURE: 98 F | SYSTOLIC BLOOD PRESSURE: 116 MMHG | HEIGHT: 69 IN | RESPIRATION RATE: 18 BRPM

## 2018-03-12 DIAGNOSIS — C61 MALIGNANT NEOPLASM OF PROSTATE: Primary | ICD-10-CM

## 2018-03-12 DIAGNOSIS — C61 PROSTATE CANCER: ICD-10-CM

## 2018-03-12 LAB
BILIRUB SERPL-MCNC: NORMAL MG/DL
BLOOD URINE, POC: NORMAL
COLOR, POC UA: YELLOW
GLUCOSE UR QL STRIP: NORMAL
KETONES UR QL STRIP: NORMAL
LEUKOCYTE ESTERASE URINE, POC: NORMAL
NITRITE, POC UA: NORMAL
PH, POC UA: 6
PROTEIN, POC: NORMAL
SPECIFIC GRAVITY, POC UA: 1.01
UROBILINOGEN, POC UA: NORMAL

## 2018-03-12 PROCEDURE — 99999 PR PBB SHADOW E&M-EST. PATIENT-LVL III: CPT | Mod: PBBFAC,,, | Performed by: UROLOGY

## 2018-03-12 PROCEDURE — 3078F DIAST BP <80 MM HG: CPT | Mod: CPTII,S$GLB,, | Performed by: UROLOGY

## 2018-03-12 PROCEDURE — 99214 OFFICE O/P EST MOD 30 MIN: CPT | Mod: S$GLB,,, | Performed by: UROLOGY

## 2018-03-12 PROCEDURE — 3074F SYST BP LT 130 MM HG: CPT | Mod: CPTII,S$GLB,, | Performed by: UROLOGY

## 2018-03-12 PROCEDURE — 81002 URINALYSIS NONAUTO W/O SCOPE: CPT | Mod: S$GLB,,, | Performed by: UROLOGY

## 2018-03-12 NOTE — PROGRESS NOTES
Subjective:       Patient ID: Edwin Sepulveda III is a 63 y.o. male.    Chief Complaint:   OFFICE NOTE    CHIEF COMPLAINT:  Prostate cancer.    Mr. Sepulveda is a 63-year-old male who in January 2014, completed radiation   therapy for prostate cancer.  Since then, his PSA has been fairly stable.  The   last PSA was on 08/2017, 0.94.  This was repeated a couple of weeks ago on   03/08/2018 and was 0.7.  The patient is significantly free of any symptoms.    Have nocturia x1.  No dysuria or hematuria.  The flow is adequate and he feels   that he can empty the bladder satisfactorily.  Denies any difficulty with bowel   movements or blood in the stool.  He denies weight loss or bone pain.    FAMILY HISTORY:  Significant.  His grandfather and the father suffered of   prostate cancer.  He is , but they do not have any children.    PAST MEDICAL AND PAST SURGICAL HISTORY:  Well documented in the medical record,   including medications and allergies and all this was reviewed by me during this   visit.    The urinalysis today is within normal limits.      EOR/HN  dd: 03/12/2018 13:58:35 (CDT)  td: 03/13/2018 06:56:06 (CDT)  Doc ID   #[id number]  Job ID #[id number]    CC:       HPI  Review of Systems   Constitutional: Negative for activity change and appetite change.   HENT: Negative.    Eyes: Negative for discharge.   Respiratory: Negative for cough and shortness of breath.    Cardiovascular: Negative for chest pain and palpitations.   Gastrointestinal: Negative for abdominal distention, abdominal pain, constipation and vomiting.   Genitourinary: Negative for discharge, dysuria, flank pain, frequency, hematuria, testicular pain and urgency.   Musculoskeletal: Negative for arthralgias.   Skin: Negative for rash.   Neurological: Negative for dizziness.   Psychiatric/Behavioral: The patient is not nervous/anxious.        Objective:      Physical Exam   Constitutional: He appears well-developed and well-nourished.   HENT:   Head:  Normocephalic.   Eyes: Pupils are equal, round, and reactive to light.   Neck: Normal range of motion.   Cardiovascular: Normal rate.    Pulmonary/Chest: Effort normal.   Abdominal: Soft. He exhibits no distension and no mass. There is no tenderness.   Genitourinary: Rectum normal, prostate normal and penis normal. Rectal exam shows no external hemorrhoid, no mass and no tenderness. Prostate is not enlarged and not tender. Right testis shows no mass and no tenderness. Left testis shows no mass and no tenderness. No discharge found.   Musculoskeletal: Normal range of motion.   Neurological: He is alert.   Skin: Skin is warm.     Psychiatric: He has a normal mood and affect.       Assessment:       1. Malignant neoplasm of prostate    2. Prostate cancer        Plan:       Malignant neoplasm of prostate  -     POCT URINE DIPSTICK WITHOUT MICROSCOPE    Prostate cancer    RTC in 6 mo with a PSA. After that will monitor yearly.

## 2018-04-20 ENCOUNTER — PATIENT MESSAGE (OUTPATIENT)
Dept: ENDOCRINOLOGY | Facility: CLINIC | Age: 63
End: 2018-04-20

## 2018-04-23 ENCOUNTER — PATIENT MESSAGE (OUTPATIENT)
Dept: ENDOCRINOLOGY | Facility: CLINIC | Age: 63
End: 2018-04-23

## 2018-04-24 ENCOUNTER — PATIENT MESSAGE (OUTPATIENT)
Dept: ENDOCRINOLOGY | Facility: CLINIC | Age: 63
End: 2018-04-24

## 2018-06-10 ENCOUNTER — PATIENT MESSAGE (OUTPATIENT)
Dept: ENDOCRINOLOGY | Facility: CLINIC | Age: 63
End: 2018-06-10

## 2018-06-11 ENCOUNTER — PATIENT MESSAGE (OUTPATIENT)
Dept: ENDOCRINOLOGY | Facility: CLINIC | Age: 63
End: 2018-06-11

## 2018-07-20 ENCOUNTER — PATIENT MESSAGE (OUTPATIENT)
Dept: UROLOGY | Facility: CLINIC | Age: 63
End: 2018-07-20

## 2018-08-07 ENCOUNTER — PATIENT MESSAGE (OUTPATIENT)
Dept: UROLOGY | Facility: CLINIC | Age: 63
End: 2018-08-07

## 2018-08-08 DIAGNOSIS — C61 PROSTATE CANCER: Primary | ICD-10-CM

## 2018-08-15 ENCOUNTER — LAB VISIT (OUTPATIENT)
Dept: LAB | Facility: HOSPITAL | Age: 63
End: 2018-08-15
Attending: UROLOGY
Payer: COMMERCIAL

## 2018-08-15 DIAGNOSIS — C61 PROSTATE CANCER: ICD-10-CM

## 2018-08-15 LAB
PROSTATE SPECIFIC ANTIGEN, TOTAL: 0.49 NG/ML
PSA FREE MFR SERPL: 16.33 %
PSA FREE SERPL-MCNC: 0.08 NG/ML

## 2018-08-15 PROCEDURE — 84154 ASSAY OF PSA FREE: CPT

## 2018-08-15 PROCEDURE — 36415 COLL VENOUS BLD VENIPUNCTURE: CPT | Mod: PO

## 2018-08-22 ENCOUNTER — PATIENT MESSAGE (OUTPATIENT)
Dept: UROLOGY | Facility: CLINIC | Age: 63
End: 2018-08-22

## 2018-08-22 ENCOUNTER — OFFICE VISIT (OUTPATIENT)
Dept: RADIATION ONCOLOGY | Facility: CLINIC | Age: 63
End: 2018-08-22
Payer: COMMERCIAL

## 2018-08-22 ENCOUNTER — OFFICE VISIT (OUTPATIENT)
Dept: UROLOGY | Facility: CLINIC | Age: 63
End: 2018-08-22
Payer: COMMERCIAL

## 2018-08-22 VITALS
BODY MASS INDEX: 26.22 KG/M2 | HEART RATE: 81 BPM | SYSTOLIC BLOOD PRESSURE: 127 MMHG | WEIGHT: 177 LBS | HEIGHT: 69 IN | DIASTOLIC BLOOD PRESSURE: 79 MMHG

## 2018-08-22 VITALS
HEART RATE: 59 BPM | SYSTOLIC BLOOD PRESSURE: 133 MMHG | BODY MASS INDEX: 26.29 KG/M2 | WEIGHT: 178 LBS | DIASTOLIC BLOOD PRESSURE: 84 MMHG

## 2018-08-22 DIAGNOSIS — C61 PROSTATE CANCER: Primary | ICD-10-CM

## 2018-08-22 DIAGNOSIS — C61 MALIGNANT NEOPLASM OF PROSTATE: ICD-10-CM

## 2018-08-22 LAB
BILIRUB SERPL-MCNC: NEGATIVE MG/DL
BLOOD URINE, POC: NEGATIVE
COLOR, POC UA: ABNORMAL
GLUCOSE UR QL STRIP: NEGATIVE
KETONES UR QL STRIP: NEGATIVE
LEUKOCYTE ESTERASE URINE, POC: ABNORMAL
NITRITE, POC UA: NEGATIVE
PH, POC UA: 6
PROTEIN, POC: NEGATIVE
SPECIFIC GRAVITY, POC UA: 1020
UROBILINOGEN, POC UA: NEGATIVE

## 2018-08-22 PROCEDURE — 3078F DIAST BP <80 MM HG: CPT | Mod: S$GLB,ICN,, | Performed by: UROLOGY

## 2018-08-22 PROCEDURE — 81002 URINALYSIS NONAUTO W/O SCOPE: CPT | Mod: S$GLB,ICN,, | Performed by: UROLOGY

## 2018-08-22 PROCEDURE — 99214 OFFICE O/P EST MOD 30 MIN: CPT | Mod: 25,S$GLB,ICN, | Performed by: UROLOGY

## 2018-08-22 PROCEDURE — 99213 OFFICE O/P EST LOW 20 MIN: CPT | Mod: ,,, | Performed by: RADIOLOGY

## 2018-08-22 PROCEDURE — 3008F BODY MASS INDEX DOCD: CPT | Mod: ,,, | Performed by: RADIOLOGY

## 2018-08-22 PROCEDURE — 99999 PR PBB SHADOW E&M-EST. PATIENT-LVL III: CPT | Mod: PBBFAC,,, | Performed by: UROLOGY

## 2018-08-22 PROCEDURE — 3008F BODY MASS INDEX DOCD: CPT | Mod: S$GLB,ICN,, | Performed by: UROLOGY

## 2018-08-22 PROCEDURE — 3079F DIAST BP 80-89 MM HG: CPT | Mod: ,,, | Performed by: RADIOLOGY

## 2018-08-22 PROCEDURE — 3075F SYST BP GE 130 - 139MM HG: CPT | Mod: ,,, | Performed by: RADIOLOGY

## 2018-08-22 PROCEDURE — 3074F SYST BP LT 130 MM HG: CPT | Mod: S$GLB,ICN,, | Performed by: UROLOGY

## 2018-08-22 NOTE — PROGRESS NOTES
Edwin TERRY [facility]  [id number]  1955 8/22/2018  [address]in [address]  [address] [place] [address]    DIAGNOSIS:prostate cancer  REASON FOR VISIT: Routine scheduled follow-up.    HISTORY OF PRESENT ILLNESS:   This 62-year-old male originally seen for elevated PSA level that he reports was as high as 9, never crossed 10 but prior records reach 6.7. He underwent TRUS biopsy that revealed 7 out of 12 cores with the disease predominantly on the right side positive for adenocarcinoma Gildardo score 3+3 = 6. Bone scan as well as CT of the abdomen and pelvis were negative and he was staged as low risk prostate adenocarcinoma.     He completed radiation therapy alone to a dose of 7560 cGy to the prostate on 01/30/2015 using and I am her T technique. He tolerated therapy very well. During his last week of therapy at PSA returned 4.3     INTERVAL HISTORY:   PSA  1/15     4.3  3/15     1.6  8/15     0.88  2/16     1.2  8/16     0.75  2/17     1.4  8/17     0.94    INTERVAL HISTORY:   Most recent PSA is  Is now 0.49.    Overall doing well. He does have nocturia once or twice per night, no evidence of urinary obstruction dysuria or blood. Bowel function is good. s have ahemorrhoid but no blood    Review of Systems   Constitutional: Negative for fatigue and fever.   HENT:   Negative for mouth sores and sore throat.    Respiratory: Negative for cough and hemoptysis.    Gastrointestinal: Negative for abdominal pain, blood in stool, constipation and rectal pain.   Genitourinary: Negative for bladder incontinence, difficulty urinating, dysuria and nocturia.    Skin: Negative for itching and rash.   Psychiatric/Behavioral: Negative for confusion. The patient is not nervous/anxious.      Past Medical History:   Diagnosis Date    Family history of malignant neoplasm of prostate 8/14/2011    Gastroesophageal reflux disease with hiatal hernia     History of prostate cancer 1/7/2016    Hyperlipidemia     Kidney stones      Multinodular goiter     no medications    Prostate cancer 10/20/2014    Prostate disorder     Dr Santos      Past Surgical History:   Procedure Laterality Date    APPENDECTOMY      COLONOSCOPY  3/26/2008    5-10 year recheck per Dr. Borja (normal exam)    esphogus scope      leg repair Left     ORCHIECTOMY      spermatocele repair       Social History     Socioeconomic History    Marital status:      Spouse name: None    Number of children: None    Years of education: None    Highest education level: None   Social Needs    Financial resource strain: None    Food insecurity - worry: None    Food insecurity - inability: None    Transportation needs - medical: None    Transportation needs - non-medical: None   Occupational History    None   Tobacco Use    Smoking status: Former Smoker     Last attempt to quit: 1976     Years since quittin.6   Substance and Sexual Activity    Alcohol use: Yes     Alcohol/week: 1.8 oz     Types: 3 Glasses of wine per week    Drug use: No    Sexual activity: Yes     Partners: Female   Other Topics Concern    None   Social History Narrative    None     Family History   Problem Relation Age of Onset    Hypertension Mother     COPD Father     Cancer Father         prostate    Hypertension Sister     Diabetes Sister     Hyperlipidemia Sister     Cancer Maternal Grandmother     Cancer Paternal Grandfather         prostate     Medication List with Changes/Refills   Current Medications    CALCIUM CARBONATE (TUMS) 200 MG CALCIUM (500 MG) CHEWABLE TABLET    Take 1 tablet by mouth daily as needed for Heartburn.    VITAMIN D 1000 UNITS TAB    Take 1,000 mg by mouth once daily.     Review of patient's allergies indicates:   Allergen Reactions    Penicillins Rash     Other reaction(s): Rash       QUALITY OF LIFE: 80%- Normal Activity with Effort: Some Symptoms of Disease    There were no vitals filed for this visit.    PHYSICAL EXAM: alert  oriented, well-groomed  GENERAL: alert; in no apparent distress.   HEAD: normocephalic, atraumatic.  EYES: pupils are equal, round, reactive to light and accommodation. Sclera anicteric. Conjunctiva not injected.   NOSE/THROAT: no nasal erythema or rhinorrhea. Oropharynx pink, without erythema, ulcerations or thrush.   NECK: no cervical motion rigidity; supple with no masses.  CHEST: clear to auscultation bilaterally; no wheezes, crackles or rubs. Patient is speaking comfortably on room air with normal work of breathing without using accessory muscles of respiration.  CARDIOVASCULAR: regular rate and rhythm; no murmurs, rubs or gallops.  ABDOMEN: soft, nontender, nondistended. Bowel sounds present.   MUSCULOSKELETAL: no tenderness to palpation along the spine or scapulae. Normal range of motion.  NEUROLOGIC: cranial nerves II-XII intact bilaterally. Strength 5/5 in bilateral upper and lower extremities. No sensory deficits appreciated. Reflexes globally intact. No cerebellar signs. Normal gait.  LYMPHATIC: no cervical, supraclavicular or axillary adenopathy appreciated bilaterally.   EXTREMITIES: no clubbing, cyanosis, edema.  SKIN: no erythema, rashes, ulcerations noted.     ANCILLARY DATA: PSA 0.49    ASSESSMENT:robert loredo gentleman with , PSA still decreasing, now 0.49  PLAN:  Continue with follow-up, we'll see him back in one year, recommend continue PSA every 4-6 months    All questions answered and contact information provided. Patient understands free to call us anytime with any questions or concerns regarding radiation therapy.    TIME SPENT WITH PATIENT: I have personally seen and evaluated this patient. Greater than 50% of this time was spent discussing coordination of care and/or counseling.      PHYSICIAN: Francis Candelaria MD

## 2018-08-22 NOTE — PROGRESS NOTES
Subjective:       Patient ID: Edwin Sepulveda III is a 63 y.o. male.    Chief Complaint:   OFFICE NOTE    CHIEF COMPLAINT:  History of prostate cancer.    Mr. Sepulveda is a 63-year-old male who in January 2014 completed radiation  therapy for prostate cancer.  Since then, the PSA has been very stable  below 1.  The last PSA was on 08/15/2018, 0.49.  The patient is here for  his semi-annual prostate evaluation.  The patient referred to have nocturia  x1, on occasions x2.  No dysuria.  No hematuria.  The flow is adequate and  he feels like he can empty the bladder satisfactorily.    FAMILY HISTORY:  Significant in his father and grandfather suffered of  prostate cancer.    PAST MEDICAL AND SURGICAL HISTORY:  Well documented in the medical record  and all this was reviewed by me during this visit.    The urinalysis today is clear.        EOR/HN dd: 08/22/2018 16:47:26 (CDT)   td: 08/23/2018 00:48:15 (CDT)  Doc ID #[id number]   Job ID #[id number]    CC:            HPI  Review of Systems   Constitutional: Negative for activity change and appetite change.   HENT: Negative.    Eyes: Negative for discharge.   Respiratory: Negative for cough and shortness of breath.    Cardiovascular: Negative for chest pain and palpitations.   Gastrointestinal: Negative for abdominal distention, abdominal pain, constipation and vomiting.   Genitourinary: Negative for discharge, dysuria, flank pain, frequency, hematuria, testicular pain and urgency.   Musculoskeletal: Negative for arthralgias.   Skin: Negative for rash.   Neurological: Negative for dizziness.   Psychiatric/Behavioral: The patient is not nervous/anxious.        Objective:      Physical Exam   Constitutional: He appears well-developed and well-nourished.   HENT:   Head: Normocephalic.   Eyes: Pupils are equal, round, and reactive to light.   Neck: Normal range of motion.   Cardiovascular: Normal rate.    Pulmonary/Chest: Effort normal.   Abdominal: Soft. He exhibits no distension and  no mass. There is no tenderness.   Genitourinary: Rectum normal, prostate normal and penis normal. Rectal exam shows no external hemorrhoid, no mass and no tenderness. Prostate is not enlarged and not tender. Right testis shows no mass and no tenderness. Left testis shows no mass and no tenderness. No discharge found.   Musculoskeletal: Normal range of motion.   Neurological: He is alert.   Skin: Skin is warm.     Psychiatric: He has a normal mood and affect.       Assessment:       1. Prostate cancer        Plan:       Prostate cancer  -     POCT URINE DIPSTICK WITHOUT MICROSCOPE    Stable prostate after treatment of PCA with radiation. RTC in 6 mo with PSA. Ordered.

## 2018-11-08 ENCOUNTER — PATIENT MESSAGE (OUTPATIENT)
Dept: FAMILY MEDICINE | Facility: CLINIC | Age: 63
End: 2018-11-08

## 2018-12-07 ENCOUNTER — PATIENT MESSAGE (OUTPATIENT)
Dept: FAMILY MEDICINE | Facility: CLINIC | Age: 63
End: 2018-12-07

## 2019-01-18 ENCOUNTER — LAB VISIT (OUTPATIENT)
Dept: LAB | Facility: HOSPITAL | Age: 64
End: 2019-01-18
Attending: UROLOGY
Payer: COMMERCIAL

## 2019-01-18 ENCOUNTER — PATIENT MESSAGE (OUTPATIENT)
Dept: UROLOGY | Facility: CLINIC | Age: 64
End: 2019-01-18

## 2019-01-18 DIAGNOSIS — R31.9 HEMATURIA OF UNDIAGNOSED CAUSE: ICD-10-CM

## 2019-01-18 DIAGNOSIS — R31.9 HEMATURIA OF UNDIAGNOSED CAUSE: Primary | ICD-10-CM

## 2019-01-18 LAB
BACTERIA #/AREA URNS AUTO: ABNORMAL /HPF
BILIRUB UR QL STRIP: NEGATIVE
CLARITY UR REFRACT.AUTO: ABNORMAL
COLOR UR AUTO: ABNORMAL
GLUCOSE UR QL STRIP: NEGATIVE
HGB UR QL STRIP: ABNORMAL
HYALINE CASTS UR QL AUTO: 0 /LPF
KETONES UR QL STRIP: NEGATIVE
LEUKOCYTE ESTERASE UR QL STRIP: NEGATIVE
MICROSCOPIC COMMENT: ABNORMAL
NITRITE UR QL STRIP: NEGATIVE
PH UR STRIP: 6 [PH] (ref 5–8)
PROT UR QL STRIP: ABNORMAL
RBC #/AREA URNS AUTO: >100 /HPF (ref 0–4)
SP GR UR STRIP: 1.01 (ref 1–1.03)
URN SPEC COLLECT METH UR: ABNORMAL
WBC #/AREA URNS AUTO: 0 /HPF (ref 0–5)

## 2019-01-18 PROCEDURE — 81001 URINALYSIS AUTO W/SCOPE: CPT

## 2019-01-19 ENCOUNTER — NURSE TRIAGE (OUTPATIENT)
Dept: ADMINISTRATIVE | Facility: CLINIC | Age: 64
End: 2019-01-19

## 2019-01-19 ENCOUNTER — PATIENT MESSAGE (OUTPATIENT)
Dept: UROLOGY | Facility: CLINIC | Age: 64
End: 2019-01-19

## 2019-01-19 ENCOUNTER — HOSPITAL ENCOUNTER (EMERGENCY)
Facility: HOSPITAL | Age: 64
Discharge: HOME | End: 2019-01-19
Attending: EMERGENCY MEDICINE
Payer: COMMERCIAL

## 2019-01-19 VITALS
RESPIRATION RATE: 18 BRPM | TEMPERATURE: 98 F | HEART RATE: 80 BPM | OXYGEN SATURATION: 96 % | BODY MASS INDEX: 26.96 KG/M2 | HEIGHT: 69 IN | SYSTOLIC BLOOD PRESSURE: 168 MMHG | DIASTOLIC BLOOD PRESSURE: 89 MMHG | WEIGHT: 182 LBS

## 2019-01-19 DIAGNOSIS — R31.9 URINARY TRACT INFECTION WITH HEMATURIA, SITE UNSPECIFIED: Primary | ICD-10-CM

## 2019-01-19 DIAGNOSIS — N39.0 URINARY TRACT INFECTION WITH HEMATURIA, SITE UNSPECIFIED: Primary | ICD-10-CM

## 2019-01-19 LAB
ALBUMIN SERPL BCP-MCNC: 4 G/DL
ALP SERPL-CCNC: 64 U/L
ALT SERPL W/O P-5'-P-CCNC: 14 U/L
ANION GAP SERPL CALC-SCNC: 9 MMOL/L
APTT BLDCRRT: 27.8 SEC
AST SERPL-CCNC: 14 U/L
BACTERIA #/AREA URNS HPF: ABNORMAL /HPF
BASOPHILS # BLD AUTO: 0 K/UL
BASOPHILS NFR BLD: 0.5 %
BILIRUB SERPL-MCNC: 0.4 MG/DL
BILIRUB UR QL STRIP: NEGATIVE
BUN SERPL-MCNC: 21 MG/DL
CALCIUM SERPL-MCNC: 9.3 MG/DL
CHLORIDE SERPL-SCNC: 105 MMOL/L
CLARITY UR: ABNORMAL
CO2 SERPL-SCNC: 26 MMOL/L
COLOR UR: ABNORMAL
CREAT SERPL-MCNC: 0.9 MG/DL
DIFFERENTIAL METHOD: ABNORMAL
EOSINOPHIL # BLD AUTO: 0 K/UL
EOSINOPHIL NFR BLD: 0.7 %
ERYTHROCYTE [DISTWIDTH] IN BLOOD BY AUTOMATED COUNT: 13.9 %
EST. GFR  (AFRICAN AMERICAN): >60 ML/MIN/1.73 M^2
EST. GFR  (NON AFRICAN AMERICAN): >60 ML/MIN/1.73 M^2
GLUCOSE SERPL-MCNC: 102 MG/DL
GLUCOSE UR QL STRIP: NEGATIVE
HCT VFR BLD AUTO: 43.6 %
HGB BLD-MCNC: 14.2 G/DL
HGB UR QL STRIP: ABNORMAL
HYALINE CASTS #/AREA URNS LPF: 0 /LPF
INR PPP: 1
KETONES UR QL STRIP: ABNORMAL
LEUKOCYTE ESTERASE UR QL STRIP: ABNORMAL
LYMPHOCYTES # BLD AUTO: 0.9 K/UL
LYMPHOCYTES NFR BLD: 18 %
MCH RBC QN AUTO: 29.4 PG
MCHC RBC AUTO-ENTMCNC: 32.6 G/DL
MCV RBC AUTO: 90 FL
MICROSCOPIC COMMENT: ABNORMAL
MONOCYTES # BLD AUTO: 0.5 K/UL
MONOCYTES NFR BLD: 10.1 %
NEUTROPHILS # BLD AUTO: 3.4 K/UL
NEUTROPHILS NFR BLD: 70.7 %
NITRITE UR QL STRIP: POSITIVE
PH UR STRIP: 7 [PH] (ref 5–8)
PLATELET # BLD AUTO: 176 K/UL
PMV BLD AUTO: 8.6 FL
POTASSIUM SERPL-SCNC: 4.1 MMOL/L
PROT SERPL-MCNC: 6.8 G/DL
PROT UR QL STRIP: ABNORMAL
PROTHROMBIN TIME: 10.7 SEC
RBC # BLD AUTO: 4.84 M/UL
RBC #/AREA URNS HPF: >100 /HPF (ref 0–4)
SODIUM SERPL-SCNC: 140 MMOL/L
SP GR UR STRIP: 1.01 (ref 1–1.03)
URN SPEC COLLECT METH UR: ABNORMAL
UROBILINOGEN UR STRIP-ACNC: 1 EU/DL
WBC # BLD AUTO: 4.8 K/UL
WBC #/AREA URNS HPF: 0 /HPF (ref 0–5)

## 2019-01-19 PROCEDURE — 99283 EMERGENCY DEPT VISIT LOW MDM: CPT

## 2019-01-19 PROCEDURE — 85610 PROTHROMBIN TIME: CPT

## 2019-01-19 PROCEDURE — 85730 THROMBOPLASTIN TIME PARTIAL: CPT

## 2019-01-19 PROCEDURE — 80053 COMPREHEN METABOLIC PANEL: CPT

## 2019-01-19 PROCEDURE — 81000 URINALYSIS NONAUTO W/SCOPE: CPT

## 2019-01-19 PROCEDURE — 25000003 PHARM REV CODE 250: Performed by: NURSE PRACTITIONER

## 2019-01-19 PROCEDURE — 85025 COMPLETE CBC W/AUTO DIFF WBC: CPT

## 2019-01-19 PROCEDURE — 36415 COLL VENOUS BLD VENIPUNCTURE: CPT

## 2019-01-19 RX ORDER — CEPHALEXIN 250 MG/1
500 CAPSULE ORAL
Status: COMPLETED | OUTPATIENT
Start: 2019-01-19 | End: 2019-01-19

## 2019-01-19 RX ORDER — CEPHALEXIN 250 MG/1
250 CAPSULE ORAL EVERY 12 HOURS
Qty: 14 CAPSULE | Refills: 0 | Status: SHIPPED | OUTPATIENT
Start: 2019-01-19 | End: 2019-01-26

## 2019-01-19 RX ADMIN — CEPHALEXIN 500 MG: 250 CAPSULE ORAL at 08:01

## 2019-01-19 NOTE — TELEPHONE ENCOUNTER
"    Reason for Disposition   Passing pure blood or large blood clots (i.e., size > a dime) (Exception: venkatesh or small strands)    Answer Assessment - Initial Assessment Questions  1. COLOR of URINE: "Describe the color of the urine."  (e.g., tea-colored, pink, red, blood clots, bloody)      clots  2. ONSET: "When did the bleeding start?"       3 days   3. EPISODES: "How many times has there been blood in the urine?" or "How many times today?"      Every time he urinates  4. PAIN with URINATION: "Is there any pain with passing your urine?" If so, ask: "How bad is the pain?"  (Scale 1-10; or mild, moderate, severe)     - MILD - complains slightly about urination hurting     - MODERATE - interferes with normal activities       - SEVERE - excruciating, unwilling or unable to urinate because of the pain       No pain  5. FEVER: "Do you have a fever?" If so, ask: "What is your temperature, how was it measured, and when did it start?"      No   6. ASSOCIATED SYMPTOMS: "Are you passing urine more frequently than usual?"      yes  7. OTHER SYMPTOMS: "Do you have any other symptoms?" (e.g., back/flank pain, abdominal pain, vomiting)      no  8. PREGNANCY: "Is there any chance you are pregnant?" "When was your last menstrual period?"      no    Protocols used:  URINE - BLOOD IN-A-    Per protocol, advised to go to ER  "

## 2019-01-20 NOTE — ED PROVIDER NOTES
Encounter Date: 2019       History     Chief Complaint   Patient presents with    Hematuria     C/o hematuria x 3 days. States he is passing blood clots. Denies urinary retention.      2019  6:52 PM     Chief Complaint:     The patient is a 63 y.o. Male  who presents with c/o gross hematuria and blood clots x 3 days. Pt reports he had prostate cancer approximately 4 1/2 years ago for which he underwent radiation therapy and has done well since. Denies pain. Denies urinary symptoms. Denies fever, chills, nausea, vomiting. Reports he notified his urologist Dr Santos yesterday and had a urine test done.          Review of patient's allergies indicates:   Allergen Reactions    Penicillins Rash     Other reaction(s): Rash     Past Medical History:   Diagnosis Date    Family history of malignant neoplasm of prostate 2011    Gastroesophageal reflux disease with hiatal hernia     History of prostate cancer 2016    Hyperlipidemia     Kidney stones     Multinodular goiter     no medications    Prostate cancer 10/20/2014    Prostate disorder     Dr Santos      Past Surgical History:   Procedure Laterality Date    APPENDECTOMY      COLONOSCOPY  3/26/2008    5-10 year recheck per Dr. Borja (normal exam)    CYSTOSCOPY N/A 10/7/2014    Performed by Adin [facility] at [facility]    eFlixus scope      leg repair Left     ORCHIECTOMY      spermatocele repair      TRANSRECTAL ULTRASOUND GUIDED PROSTATE BIOPSY Bilateral 10/7/2014    Performed by Adin [facility] MD at [facility] OR     Family History   Problem Relation Age of Onset    Hypertension Mother     COPD Father     Cancer Father         prostate    Hypertension Sister     Diabetes Sister     Hyperlipidemia Sister     Cancer Maternal Grandmother     Cancer Paternal Grandfather         prostate     Social History     Tobacco Use    Smoking status: Former Smoker     Last attempt to quit: 1976     Years since quittin.0    Substance Use Topics    Alcohol use: Yes     Alcohol/week: 1.8 oz     Types: 3 Glasses of wine per week    Drug use: No     Review of Systems   Constitutional: Negative for activity change, appetite change, chills and fever.   Eyes: Negative for visual disturbance.   Respiratory: Negative for shortness of breath.    Cardiovascular: Negative for chest pain and palpitations.   Gastrointestinal: Negative for abdominal distention, abdominal pain, blood in stool, constipation, diarrhea, nausea, rectal pain and vomiting.   Genitourinary: Positive for hematuria. Negative for decreased urine volume, difficulty urinating, dysuria, enuresis, flank pain, frequency, penile pain, scrotal swelling, testicular pain and urgency.   Musculoskeletal: Negative for back pain.   Skin: Negative for pallor, rash and wound.   Neurological: Negative for dizziness, syncope, weakness, light-headedness, numbness and headaches.   Hematological: Does not bruise/bleed easily.   Psychiatric/Behavioral: Negative for confusion.       Physical Exam     Initial Vitals [01/19/19 1824]   BP Pulse Resp Temp SpO2   (!) 168/89 80 18 97.9 °F (36.6 °C) 96 %      MAP       --         Physical Exam    Nursing note and vitals reviewed.  Constitutional: He appears well-developed and well-nourished. He is not diaphoretic. No distress.   HENT:   Head: Normocephalic and atraumatic.   Eyes: Conjunctivae and EOM are normal. Right eye exhibits no discharge. Left eye exhibits no discharge.   Neck: Normal range of motion. Neck supple.   Cardiovascular: Normal rate, regular rhythm and normal heart sounds.   Pulmonary/Chest: Breath sounds normal.   Abdominal: Soft. Bowel sounds are normal. He exhibits no distension. There is no tenderness. There is no rebound and no guarding.   Musculoskeletal: Normal range of motion.   Neurological: He is alert and oriented to person, place, and time. He has normal strength. GCS score is 15. GCS eye subscore is 4. GCS verbal subscore  is 5. GCS motor subscore is 6.   Skin: Skin is warm and dry. Capillary refill takes less than 2 seconds. No pallor.   Psychiatric: He has a normal mood and affect. His behavior is normal. Thought content normal.         ED Course   Procedures  Labs Reviewed   CBC W/ AUTO DIFFERENTIAL - Abnormal; Notable for the following components:       Result Value    MPV 8.6 (*)     Lymph # 0.9 (*)     All other components within normal limits   URINALYSIS, REFLEX TO URINE CULTURE - Abnormal; Notable for the following components:    Color, UA Red (*)     Appearance, UA Cloudy (*)     Protein, UA 2+ (*)     Ketones, UA Trace (*)     Occult Blood UA 3+ (*)     Nitrite, UA Positive (*)     Leukocytes, UA Trace (*)     All other components within normal limits    Narrative:     Preferred Collection Type->Urine, Clean Catch   URINALYSIS MICROSCOPIC - Abnormal; Notable for the following components:    RBC, UA >100 (*)     All other components within normal limits    Narrative:     Preferred Collection Type->Urine, Clean Catch   COMPREHENSIVE METABOLIC PANEL   APTT   PROTIME-INR          Imaging Results    None          Medical Decision Making:   Differential Diagnosis:   UTI  Renal cell carcinoma  glomerural disease       APC / Resident Notes:   The patient's urinalysis are consistent with a urinary tract infection. CBC, CMP, PTT and PT/INR all wnl.  The patient does not appear to have pyelonephritis, urinary retention, ureterolithiasis, or urosepsis.  The patient will be treated with antibiotics as an outpatient and has been given specific return precautions. Pt is instructed to f/u with his urologist this week. I do not feel further evaluation or treatment is needed at this time and pt is stable for discharge. I have discussed with Dr Jean who agrees with POC. Pt voices understanding and is agreeable to the plan.  He is given specific return precautions.          Attending Attestation:     Physician Attestation Statement for  NP/PA:   I discussed this assessment and plan of this patient with the NP/PA, but I did not personally examine the patient. The face to face encounter was performed by the NP/PA.    Other NP/PA Attestation Additions:    History of Present Illness: 63-year-old male presented with a chief complaint of hematuria.    Medical Decision Making: Initial differential diagnosis included but not limited hemorrhagic cystitis, pyelonephritis, and coagulopathy.  I am in agreement with the nurse practitioner's assessment, treatment, and plan of care.                    Clinical Impression:   The encounter diagnosis was Urinary tract infection with hematuria, site unspecified.                             Iliana Desouza NP  01/19/19 2032       Antonio Jean MD  01/19/19 [id number]

## 2019-01-21 ENCOUNTER — PATIENT MESSAGE (OUTPATIENT)
Dept: UROLOGY | Facility: CLINIC | Age: 64
End: 2019-01-21

## 2019-01-21 DIAGNOSIS — R31.9 HEMATURIA OF UNDIAGNOSED CAUSE: Primary | ICD-10-CM

## 2019-01-22 ENCOUNTER — PATIENT MESSAGE (OUTPATIENT)
Dept: FAMILY MEDICINE | Facility: CLINIC | Age: 64
End: 2019-01-22

## 2019-01-22 ENCOUNTER — PATIENT MESSAGE (OUTPATIENT)
Dept: UROLOGY | Facility: CLINIC | Age: 64
End: 2019-01-22

## 2019-01-22 ENCOUNTER — LAB VISIT (OUTPATIENT)
Dept: LAB | Facility: HOSPITAL | Age: 64
End: 2019-01-22
Attending: UROLOGY
Payer: COMMERCIAL

## 2019-01-22 DIAGNOSIS — R31.9 HEMATURIA OF UNDIAGNOSED CAUSE: ICD-10-CM

## 2019-01-22 PROCEDURE — 88112 CYTOPATH CELL ENHANCE TECH: CPT | Performed by: PATHOLOGY

## 2019-01-22 PROCEDURE — 88112 PR  CYTOPATH, CELL ENHANCE TECH: ICD-10-PCS | Mod: 26,,, | Performed by: PATHOLOGY

## 2019-01-22 PROCEDURE — 88112 CYTOPATH CELL ENHANCE TECH: CPT | Mod: 26,,, | Performed by: PATHOLOGY

## 2019-01-23 NOTE — TELEPHONE ENCOUNTER
I think he can wait for the 29th but if he has obstruction of the bladder he will have to go to the ER for a catheter.

## 2019-01-28 ENCOUNTER — PATIENT MESSAGE (OUTPATIENT)
Dept: UROLOGY | Facility: CLINIC | Age: 64
End: 2019-01-28

## 2019-01-29 ENCOUNTER — LAB VISIT (OUTPATIENT)
Dept: LAB | Facility: HOSPITAL | Age: 64
End: 2019-01-29
Attending: UROLOGY
Payer: COMMERCIAL

## 2019-01-29 ENCOUNTER — OFFICE VISIT (OUTPATIENT)
Dept: UROLOGY | Facility: CLINIC | Age: 64
End: 2019-01-29
Payer: COMMERCIAL

## 2019-01-29 VITALS
WEIGHT: 178 LBS | DIASTOLIC BLOOD PRESSURE: 68 MMHG | BODY MASS INDEX: 26.36 KG/M2 | SYSTOLIC BLOOD PRESSURE: 130 MMHG | TEMPERATURE: 98 F | HEIGHT: 69 IN | HEART RATE: 68 BPM

## 2019-01-29 DIAGNOSIS — C61 PROSTATE CANCER: ICD-10-CM

## 2019-01-29 DIAGNOSIS — R31.9 HEMATURIA OF UNDIAGNOSED CAUSE: Primary | ICD-10-CM

## 2019-01-29 LAB
BILIRUB SERPL-MCNC: NEGATIVE MG/DL
BLOOD URINE, POC: NEGATIVE
COLOR, POC UA: NORMAL
COMPLEXED PSA SERPL-MCNC: 0.49 NG/ML
GLUCOSE UR QL STRIP: NEGATIVE
KETONES UR QL STRIP: NEGATIVE
LEUKOCYTE ESTERASE URINE, POC: NEGATIVE
NITRITE, POC UA: NEGATIVE
PH, POC UA: 7
PROTEIN, POC: NEGATIVE
SPECIFIC GRAVITY, POC UA: 1010
UROBILINOGEN, POC UA: NEGATIVE

## 2019-01-29 PROCEDURE — 99999 PR PBB SHADOW E&M-EST. PATIENT-LVL III: CPT | Mod: PBBFAC,,, | Performed by: UROLOGY

## 2019-01-29 PROCEDURE — 99214 PR OFFICE/OUTPT VISIT, EST, LEVL IV, 30-39 MIN: ICD-10-PCS | Mod: S$GLB,,, | Performed by: UROLOGY

## 2019-01-29 PROCEDURE — 36415 COLL VENOUS BLD VENIPUNCTURE: CPT

## 2019-01-29 PROCEDURE — 81002 URINALYSIS NONAUTO W/O SCOPE: CPT | Mod: PBBFAC | Performed by: UROLOGY

## 2019-01-29 PROCEDURE — 99999 PR PBB SHADOW E&M-EST. PATIENT-LVL III: ICD-10-PCS | Mod: PBBFAC,,, | Performed by: UROLOGY

## 2019-01-29 PROCEDURE — 99214 OFFICE O/P EST MOD 30 MIN: CPT | Mod: S$GLB,,, | Performed by: UROLOGY

## 2019-01-29 PROCEDURE — 84153 ASSAY OF PSA TOTAL: CPT

## 2019-01-29 RX ORDER — CIPROFLOXACIN 2 MG/ML
400 INJECTION, SOLUTION INTRAVENOUS
Status: CANCELLED | OUTPATIENT
Start: 2019-02-13

## 2019-01-29 NOTE — PROGRESS NOTES
Subjective:       Patient ID: Edwin Sepulveda III is a 63 y.o. male.    Chief Complaint:  DATE OF VISIT:  01/23/2019.    CHIEF COMPLAINT:  Gross painless hematuria.    Mr. Sepulveda is a 63-year-old male who went to the Emergency Room on  01/19/2019 with an episode of gross hematuria for 3 days.  The patient  referred that the hematuria now have resolved.  He was given a course of  antibiotics after that visit.  It is to be noted at the time of the visit,  the patient's urinalysis was nitrite positive suspecting a urinary tract  infection.  In any extent, the patient has a prolonged period of gross  hematuria.    It is important to be noted that this patient in 2014 was diagnosed of  having prostate cancer and underwent radiation therapy for prostate cancer.   Considering these, I did suggest to the patient that it will be wise to  perform a cystoscopic evaluation to be sure that he did not have any  neoplastic changes in his bladder.  At the present time, the patient is  free of any  symptoms, refers that he is urinating good flow, good  control.  No dysuria and no gross hematuria.  We did a urine cytology a  week ago on 01/22/2019 and the cytology shows that the patient has no  evidence of malignant cells in the urine.  The remaining of the medical and  surgical history, the current medications and the allergies are well  documented in the medical record and all these were reviewed by me during  this visit.  Also to be noted is that this patient is not taking any  anticoagulant therapy.        EOR/HN dd: 01/29/2019 13:27:46 (CST)   td: 01/29/2019 18:22:53 (CST)  Doc ID #[id number]   Job ID #[id number]    CC:             HPI  Review of Systems   Constitutional: Negative for activity change and appetite change.   HENT: Negative.    Eyes: Negative for discharge.   Respiratory: Negative for cough and shortness of breath.    Cardiovascular: Negative for chest pain and palpitations.   Gastrointestinal: Negative for abdominal  distention, abdominal pain, constipation and vomiting.   Genitourinary: Positive for hematuria. Negative for discharge, dysuria, flank pain, frequency, testicular pain and urgency.   Musculoskeletal: Negative for arthralgias.   Skin: Negative for rash.   Neurological: Negative for dizziness.   Psychiatric/Behavioral: The patient is not nervous/anxious.        Objective:      Physical Exam   Constitutional: He appears well-developed and well-nourished.   HENT:   Head: Normocephalic.   Eyes: Pupils are equal, round, and reactive to light.   Neck: Normal range of motion.   Cardiovascular: Normal rate.    Pulmonary/Chest: Effort normal.   Abdominal: Soft. He exhibits no distension and no mass. There is no tenderness.   Genitourinary: Rectum normal, prostate normal and penis normal. Rectal exam shows no external hemorrhoid, no mass and no tenderness. Prostate is not enlarged and not tender. Right testis shows no mass and no tenderness. Left testis shows no mass and no tenderness. No discharge found.   Musculoskeletal: Normal range of motion.   Neurological: He is alert.   Skin: Skin is warm.     Psychiatric: He has a normal mood and affect.       Assessment:       1. Hematuria of undiagnosed cause    2. Prostate cancer        Plan:       Hematuria of undiagnosed cause  -     Case Request Operating Room: CYSTOSCOPY, FLEXIBLE  -     Place in Outpatient; Standing  -     Place TOAN hose; Standing    Prostate cancer  -     POCT URINE DIPSTICK WITHOUT MICROSCOPE  -     Prostate Specific Antigen, Diagnostic; Future; Expected date: 01/29/2019    Other orders  -     IP VTE LOW RISK PATIENT; Standing  -     ciprofloxacin (CIPRO)400mg/200ml D5W IVPB 400 mg    Patient to be informed of the PSA level.

## 2019-01-30 ENCOUNTER — PATIENT MESSAGE (OUTPATIENT)
Dept: SURGERY | Facility: HOSPITAL | Age: 64
End: 2019-01-30

## 2019-02-04 ENCOUNTER — PATIENT MESSAGE (OUTPATIENT)
Dept: SURGERY | Facility: HOSPITAL | Age: 64
End: 2019-02-04

## 2019-02-13 ENCOUNTER — HOSPITAL ENCOUNTER (OUTPATIENT)
Facility: HOSPITAL | Age: 64
Discharge: HOME | End: 2019-02-13
Attending: UROLOGY | Admitting: UROLOGY
Payer: MEDICARE

## 2019-02-13 ENCOUNTER — TELEPHONE (OUTPATIENT)
Dept: UROLOGY | Facility: CLINIC | Age: 64
End: 2019-02-13

## 2019-02-13 ENCOUNTER — PATIENT MESSAGE (OUTPATIENT)
Dept: SURGERY | Facility: HOSPITAL | Age: 64
End: 2019-02-13

## 2019-02-13 ENCOUNTER — HOSPITAL ENCOUNTER (OUTPATIENT)
Dept: RADIOLOGY | Facility: HOSPITAL | Age: 64
Discharge: HOME | End: 2019-02-13
Attending: UROLOGY | Admitting: UROLOGY
Payer: MEDICARE

## 2019-02-13 DIAGNOSIS — R31.9 HEMATURIA OF UNDIAGNOSED CAUSE: ICD-10-CM

## 2019-02-13 DIAGNOSIS — D49.4 NEOPLASM OF BLADDER: ICD-10-CM

## 2019-02-13 LAB
BUN SERPL-MCNC: 20 MG/DL
CREAT SERPL-MCNC: 0.9 MG/DL
EST. GFR  (AFRICAN AMERICAN): >60 ML/MIN/1.73 M^2
EST. GFR  (NON AFRICAN AMERICAN): >60 ML/MIN/1.73 M^2

## 2019-02-13 PROCEDURE — 74178 CT ABD&PLV WO CNTR FLWD CNTR: CPT | Mod: TC

## 2019-02-13 PROCEDURE — 36000707: Performed by: UROLOGY

## 2019-02-13 PROCEDURE — 82565 ASSAY OF CREATININE: CPT

## 2019-02-13 PROCEDURE — 74178 CT ABD&PLV WO CNTR FLWD CNTR: CPT | Mod: 26,,, | Performed by: RADIOLOGY

## 2019-02-13 PROCEDURE — 25500020 PHARM REV CODE 255: Performed by: UROLOGY

## 2019-02-13 PROCEDURE — 36415 COLL VENOUS BLD VENIPUNCTURE: CPT

## 2019-02-13 PROCEDURE — 84520 ASSAY OF UREA NITROGEN: CPT

## 2019-02-13 PROCEDURE — 36000706: Performed by: UROLOGY

## 2019-02-13 PROCEDURE — 52000 CYSTOURETHROSCOPY: CPT | Mod: ,,, | Performed by: UROLOGY

## 2019-02-13 PROCEDURE — 25000003 PHARM REV CODE 250: Performed by: UROLOGY

## 2019-02-13 PROCEDURE — 71000015 HC POSTOP RECOV 1ST HR: Performed by: UROLOGY

## 2019-02-13 PROCEDURE — 52000 PR CYSTOURETHROSCOPY: ICD-10-PCS | Mod: ,,, | Performed by: UROLOGY

## 2019-02-13 PROCEDURE — 71000016 HC POSTOP RECOV ADDL HR: Performed by: UROLOGY

## 2019-02-13 PROCEDURE — 74178 CT UROGRAM ABD PELVIS W WO: ICD-10-PCS | Mod: 26,,, | Performed by: RADIOLOGY

## 2019-02-13 PROCEDURE — 63600175 PHARM REV CODE 636 W HCPCS: Performed by: UROLOGY

## 2019-02-13 RX ORDER — CEFAZOLIN SODIUM 2 G/50ML
SOLUTION INTRAVENOUS
Status: DISCONTINUED
Start: 2019-02-13 | End: 2019-02-13 | Stop reason: WASHOUT

## 2019-02-13 RX ORDER — CIPROFLOXACIN 2 MG/ML
400 INJECTION, SOLUTION INTRAVENOUS
Status: COMPLETED | OUTPATIENT
Start: 2019-02-13 | End: 2019-02-13

## 2019-02-13 RX ORDER — LIDOCAINE HYDROCHLORIDE 20 MG/ML
JELLY TOPICAL
Status: DISCONTINUED | OUTPATIENT
Start: 2019-02-13 | End: 2019-02-13 | Stop reason: HOSPADM

## 2019-02-13 RX ORDER — CIPROFLOXACIN 500 MG/1
500 TABLET ORAL 2 TIMES DAILY
Qty: 10 TABLET | Refills: 0 | Status: SHIPPED | OUTPATIENT
Start: 2019-02-13 | End: 2019-02-18

## 2019-02-13 RX ADMIN — CIPROFLOXACIN 400 MG: 2 INJECTION, SOLUTION INTRAVENOUS at 08:02

## 2019-02-13 RX ADMIN — IOHEXOL 120 ML: 350 INJECTION, SOLUTION INTRAVENOUS at 01:02

## 2019-02-13 NOTE — BRIEF OP NOTE
Brief Operative Note     Surgery Date: 2/13/2019    Surgeon:   Adin Santos MD     Pre-op Diagnosis:  Hematuria of undiagnosed cause [R31.9]  Hematuria of undiagnosed cause [R31.9]    Post-op Diagnosis:  Same    Procedure:  Procedure(s) (LRB):  CYSTOSCOPY, FLEXIBLE (N/A)    Anesthesia: Local MAC    Findings/Key Components:  Bladder tumor    Estimated Blood Loss: Minimal                                                                                                                           Discharge Summary    Admit Date: 2/13/2019     Attending Physician: Adin Santos MD     Discharge Physician: Adin Santos MD      Discharge Date: 2/13/2019    Treatments/Procedures: Procedure(s) (LRB):  CYSTOSCOPY, FLEXIBLE (N/A)  Final Diagnosis:Bladder tumor  Hospital Course: Cystoscopy done as planned    Disposition: Home or Self Care     Patient Instructions:     Current Discharge Medication List:         Edwin Sepulveda III   Home Medication Instructions TERESA:[id number]    Printed on:02/13/19 0832   Medication Information                      ciprofloxacin HCl (CIPRO) 500 MG tablet  Take 1 tablet (500 mg total) by mouth 2 (two) times daily. for 10 doses                     Current Discharge Medication List      START taking these medications    Details   ciprofloxacin HCl (CIPRO) 500 MG tablet Take 1 tablet (500 mg total) by mouth 2 (two) times daily. for 10 doses  Qty: 10 tablet, Refills: 0             Discharge Procedure Orders:    Discharge Procedure Orders   CT Abdomen Pelvis W Wo Contrast   Standing Status: Future Standing Exp. Date: 02/14/20     Order Specific Question Answer Comments   Is the patient allergic to iodine or contrast? No    Is the patient on ANY Metformin medication such as Glucophage/Glucovance ? No    Does the patient have any of the following risk factors? None      Activity as tolerated

## 2019-02-13 NOTE — OP NOTE
CYSTOSCOPY REPORT    Surgery Date:  2019  Surgeon(s) and Role:     * Adin Santos MD - Primary      Edwin HARRISON Gutluigi III  : 1955    Pre Procedure Diagnosis:Hematuria    OPERATION: CYSTOSCOPY     ANESTHESIA: 10 cc 2% lidocaine jelly applied per urethra.  14 FR Flexible Olympus cystoscope used.    PROCEDURE:  The patient was taken to the cystoscopy suite and placed in supine position.  The genitalia was prepped and draped  in the usual sterile fashion.  Two percent lidocaine jelly was inserted in the urethra .  After sufficent time had passed to allow good local anesthesia, the cystoscope was inserted in the urethra and passed into the bladder visualizing the urethra along its entire course.  The dome, anterior, posterior and lateral walls were examined systematically.  The ureteral orifices were in their usual position and configuration.  The cystoscope was turned upon itself 180 degrees to visualize the bladder neck.The cystoscope was then brought to the level of the bladder neck, the water was turned on and the prostate was visualized.  The cystoscope was removed and the patient was instructed to urinate prior to leaving the office.  SPECIMEN:none    FINDINGS:  URETHRA: open with no strictures  PROSTATE: 3 cm open bladder neck. SP radiation   TRABEC: grade 2  BLADDER: Typical TCC lesions in the left bladder wall 4 cm aggregated .  URETERAL ORIFICES : NSSP   OTHER FINDINGS: None    Procedure medication: Lidocaine gel in the urethra  Post Procedure Diagnosis: Bladder tumor     ASSESSMENT/PLAN:  Status post flexible cystoscopy.  1. Push fluids for 24 hours.  2. May see blood in the urine, this should gradually improve over the next 2-3 days.  3. The patient was instructed to return to the office or go to the emergency should fever, chills, cloudy urine, or inability to urinate develop.  4.  PLAN: CT scan and TURBT  5. Follow up in for above.

## 2019-02-13 NOTE — PLAN OF CARE
Patient received no anesthesia. Patient is awake and alert. Per Dr. Santos request patient is waiting to try to set up CT scan done before discharge.

## 2019-02-13 NOTE — TELEPHONE ENCOUNTER
"+Spoke with pharmacist. States "MD entered the quantity wrong on the Rx. So we changed it. He put that the pt needs 10 tabs, twice daily for 10 days and he put 10 days total. We just changed it to 20" Thanks".       ----- Message from Natasha Bello sent at 2/13/2019  2:57 PM CST -----  Contact: Claudia   Calling to confirm how many tablets should be prescribed to pt on Rx Ciprofloxan 500 mg abd please advise [phone] and fax # [phone]     "

## 2019-02-13 NOTE — H&P
CHIEF COMPLAINT:  Gross painless hematuria.     Mr. Sepulveda is a 63-year-old male who went to the Emergency Room on  01/19/2019 with an episode of gross hematuria for 3 days.  The patient  referred that the hematuria now have resolved.  He was given a course of  antibiotics after that visit.  It is to be noted at the time of the visit,  the patient's urinalysis was nitrite positive suspecting a urinary tract  infection.  In any extent, the patient has a prolonged period of gross  hematuria.     It is important to be noted that this patient in 2014 was diagnosed of  having prostate cancer and underwent radiation therapy for prostate cancer.   Considering these, I did suggest to the patient that it will be wise to  perform a cystoscopic evaluation to be sure that he did not have any  neoplastic changes in his bladder.  At the present time, the patient is  free of any  symptoms, refers that he is urinating good flow, good  control.  No dysuria and no gross hematuria.  We did a urine cytology a  week ago on 01/22/2019 and the cytology shows that the patient has no  evidence of malignant cells in the urine.  The remaining of the medical and  surgical history, the current medications and the allergies are well  documented in the medical record and all these were reviewed by me during  this visit.  Also to be noted is that this patient is not taking any  anticoagulant therapy.           Review of Systems   Constitutional: Negative for activity change and appetite change.   HENT: Negative.    Eyes: Negative for discharge.   Respiratory: Negative for cough and shortness of breath.    Cardiovascular: Negative for chest pain and palpitations.   Gastrointestinal: Negative for abdominal distention, abdominal pain, constipation and vomiting.   Genitourinary: Positive for hematuria. Negative for discharge, dysuria, flank pain, frequency, testicular pain and urgency.   Musculoskeletal: Negative for arthralgias.   Skin: Negative  for rash.   Neurological: Negative for dizziness.   Psychiatric/Behavioral: The patient is not nervous/anxious.        Objective:   Physical Exam   Constitutional: He appears well-developed and well-nourished.   HENT:   Head: Normocephalic.   Eyes: Pupils are equal, round, and reactive to light.   Neck: Normal range of motion.   Cardiovascular: Normal rate.    Pulmonary/Chest: Effort normal.   Abdominal: Soft. He exhibits no distension and no mass. There is no tenderness.   Genitourinary: Rectum normal, prostate normal and penis normal. Rectal exam shows no external hemorrhoid, no mass and no tenderness. Prostate is not enlarged and not tender. Right testis shows no mass and no tenderness. Left testis shows no mass and no tenderness. No discharge found.   Musculoskeletal: Normal range of motion.   Neurological: He is alert.   Skin: Skin is warm.     Psychiatric: He has a normal mood and affect.       Assessment:       1. Hematuria of undiagnosed cause    2. Prostate cancer        Plan:       Hematuria of undiagnosed cause  Cystoscopy under local

## 2019-02-14 VITALS
HEART RATE: 85 BPM | DIASTOLIC BLOOD PRESSURE: 77 MMHG | WEIGHT: 178 LBS | HEIGHT: 69 IN | SYSTOLIC BLOOD PRESSURE: 131 MMHG | OXYGEN SATURATION: 98 % | RESPIRATION RATE: 15 BRPM | TEMPERATURE: 98 F | BODY MASS INDEX: 26.36 KG/M2

## 2019-02-19 ENCOUNTER — TELEPHONE (OUTPATIENT)
Dept: UROLOGY | Facility: CLINIC | Age: 64
End: 2019-02-19

## 2019-02-19 NOTE — TELEPHONE ENCOUNTER
Spoke with patient, advised of results. Patient voiced understanding.      ----- Message from Maciej Gurrola sent at 2/19/2019 12:37 PM CST -----  Contact: Patient  Type:  Patient Returning Call    Who Called:  Patient  Who Left Message for Patient:  Unknown  Does the patient know what this is regarding?:  Test results  Best Call Back Number:  [phone]

## 2019-02-20 ENCOUNTER — TELEPHONE (OUTPATIENT)
Dept: UROLOGY | Facility: CLINIC | Age: 64
End: 2019-02-20

## 2019-02-20 NOTE — TELEPHONE ENCOUNTER
----- Message from Tiny Bullard LPN sent at 2/19/2019  3:50 PM CST -----  Contact: self       ----- Message -----  From: Socrates Romero  Sent: 2/19/2019   3:36 PM  To: Tom [facility] Staff    Patient need to speak with a nurse regarding the cystoscope, please call back at [phone] (home)

## 2019-02-22 ENCOUNTER — ANESTHESIA EVENT (OUTPATIENT)
Dept: SURGERY | Facility: HOSPITAL | Age: 64
End: 2019-02-22
Payer: MEDICARE

## 2019-02-22 ENCOUNTER — OFFICE VISIT (OUTPATIENT)
Dept: UROLOGY | Facility: CLINIC | Age: 64
End: 2019-02-22
Payer: MEDICARE

## 2019-02-22 ENCOUNTER — LAB VISIT (OUTPATIENT)
Dept: LAB | Facility: HOSPITAL | Age: 64
End: 2019-02-22
Attending: UROLOGY
Payer: MEDICARE

## 2019-02-22 VITALS
HEART RATE: 67 BPM | WEIGHT: 172 LBS | HEIGHT: 69 IN | SYSTOLIC BLOOD PRESSURE: 139 MMHG | TEMPERATURE: 98 F | DIASTOLIC BLOOD PRESSURE: 86 MMHG | BODY MASS INDEX: 25.48 KG/M2

## 2019-02-22 DIAGNOSIS — Z01.818 PRE-OP TESTING: ICD-10-CM

## 2019-02-22 DIAGNOSIS — Z98.890 H/O TRANSURETHRAL DESTRUCTION OF BLADDER LESION: Primary | ICD-10-CM

## 2019-02-22 DIAGNOSIS — N32.9 LESION OF BLADDER: ICD-10-CM

## 2019-02-22 LAB
BASOPHILS # BLD AUTO: 0.05 K/UL
BASOPHILS NFR BLD: 1 %
BILIRUB SERPL-MCNC: NEGATIVE MG/DL
BLOOD URINE, POC: NEGATIVE
COLOR, POC UA: NORMAL
DIFFERENTIAL METHOD: NORMAL
EOSINOPHIL # BLD AUTO: 0.1 K/UL
EOSINOPHIL NFR BLD: 1.5 %
ERYTHROCYTE [DISTWIDTH] IN BLOOD BY AUTOMATED COUNT: 12.4 %
GLUCOSE UR QL STRIP: NEGATIVE
HCT VFR BLD AUTO: 46.6 %
HGB BLD-MCNC: 15.5 G/DL
IMM GRANULOCYTES # BLD AUTO: 0.01 K/UL
IMM GRANULOCYTES NFR BLD AUTO: 0.2 %
KETONES UR QL STRIP: NEGATIVE
LEUKOCYTE ESTERASE URINE, POC: NEGATIVE
LYMPHOCYTES # BLD AUTO: 1 K/UL
LYMPHOCYTES NFR BLD: 20.5 %
MCH RBC QN AUTO: 30.1 PG
MCHC RBC AUTO-ENTMCNC: 33.3 G/DL
MCV RBC AUTO: 91 FL
MONOCYTES # BLD AUTO: 0.5 K/UL
MONOCYTES NFR BLD: 9.6 %
NEUTROPHILS # BLD AUTO: 3.2 K/UL
NEUTROPHILS NFR BLD: 67.2 %
NITRITE, POC UA: NEGATIVE
NRBC BLD-RTO: 0 /100 WBC
PH, POC UA: 7
PLATELET # BLD AUTO: 177 K/UL
PMV BLD AUTO: 10.2 FL
PROTEIN, POC: NEGATIVE
RBC # BLD AUTO: 5.15 M/UL
SPECIFIC GRAVITY, POC UA: 1015
UROBILINOGEN, POC UA: NEGATIVE
WBC # BLD AUTO: 4.79 K/UL

## 2019-02-22 PROCEDURE — 99214 PR OFFICE/OUTPT VISIT, EST, LEVL IV, 30-39 MIN: ICD-10-PCS | Mod: 25,S$GLB,, | Performed by: UROLOGY

## 2019-02-22 PROCEDURE — 1101F PT FALLS ASSESS-DOCD LE1/YR: CPT | Mod: CPTII,S$GLB,, | Performed by: UROLOGY

## 2019-02-22 PROCEDURE — 3008F PR BODY MASS INDEX (BMI) DOCUMENTED: ICD-10-PCS | Mod: CPTII,S$GLB,, | Performed by: UROLOGY

## 2019-02-22 PROCEDURE — 3008F BODY MASS INDEX DOCD: CPT | Mod: CPTII,S$GLB,, | Performed by: UROLOGY

## 2019-02-22 PROCEDURE — 3075F PR MOST RECENT SYSTOLIC BLOOD PRESS GE 130-139MM HG: ICD-10-PCS | Mod: CPTII,S$GLB,, | Performed by: UROLOGY

## 2019-02-22 PROCEDURE — 1101F PR PT FALLS ASSESS DOC 0-1 FALLS W/OUT INJ PAST YR: ICD-10-PCS | Mod: CPTII,S$GLB,, | Performed by: UROLOGY

## 2019-02-22 PROCEDURE — 36415 COLL VENOUS BLD VENIPUNCTURE: CPT

## 2019-02-22 PROCEDURE — 99999 PR PBB SHADOW E&M-EST. PATIENT-LVL III: CPT | Mod: PBBFAC,,, | Performed by: UROLOGY

## 2019-02-22 PROCEDURE — 3079F DIAST BP 80-89 MM HG: CPT | Mod: CPTII,S$GLB,, | Performed by: UROLOGY

## 2019-02-22 PROCEDURE — 3079F PR MOST RECENT DIASTOLIC BLOOD PRESSURE 80-89 MM HG: ICD-10-PCS | Mod: CPTII,S$GLB,, | Performed by: UROLOGY

## 2019-02-22 PROCEDURE — 3075F SYST BP GE 130 - 139MM HG: CPT | Mod: CPTII,S$GLB,, | Performed by: UROLOGY

## 2019-02-22 PROCEDURE — 81002 POCT URINE DIPSTICK WITHOUT MICROSCOPE: ICD-10-PCS | Mod: S$GLB,,, | Performed by: UROLOGY

## 2019-02-22 PROCEDURE — 99999 PR PBB SHADOW E&M-EST. PATIENT-LVL III: ICD-10-PCS | Mod: PBBFAC,,, | Performed by: UROLOGY

## 2019-02-22 PROCEDURE — 85025 COMPLETE CBC W/AUTO DIFF WBC: CPT

## 2019-02-22 PROCEDURE — 81002 URINALYSIS NONAUTO W/O SCOPE: CPT | Mod: S$GLB,,, | Performed by: UROLOGY

## 2019-02-22 PROCEDURE — 99214 OFFICE O/P EST MOD 30 MIN: CPT | Mod: 25,S$GLB,, | Performed by: UROLOGY

## 2019-02-22 RX ORDER — CIPROFLOXACIN 2 MG/ML
400 INJECTION, SOLUTION INTRAVENOUS
Status: CANCELLED | OUTPATIENT
Start: 2019-03-13

## 2019-02-22 RX ORDER — CIPROFLOXACIN 500 MG/1
500 TABLET ORAL 2 TIMES DAILY
COMMUNITY
End: 2019-03-08 | Stop reason: ALTCHOICE

## 2019-02-22 NOTE — PROGRESS NOTES
Subjective:       Patient ID: Edwin Sepulveda III is a 64 y.o. male.    Chief Complaint:   OFFICE NOTE    DATE OF VISIT:  02/22/2019.    CHIEF COMPLAINT:  Bladder lesion.    HISTORY OF PRESENT ILLNESS:  Mr. Sepulveda was evaluated for gross hematuria  with a cystoscopic evaluation on 02/13/2019 and was found to have multiple  bladder lesions.  The patient had typical lesion of transitional cell  carcinoma in the left bladder wall with approximately 4 cm aggregate  surface.  He underwent a CT scan.  The CT scan did not show evidence of any  extra vesicle extension of this tumor.  Today, I explained to the patient  that the best treatment will be to proceed with a transurethral resection  of the tumor, also fulguration of small lesions with time to heal after  that and start on BCG therapy.  That will be done weekly for six weeks.   All the questions were answered at the patient's satisfaction.  The patient  wants to proceed with this on 03/13/2019 and the order for the case  requested in place in [facility] to be done at [facility].  Today's  urinalysis is completely clear.  The last PSA on January 2019 was 0.49.    It is to be noted that Mr. Sepulveda underwent radiation therapy for prostate  cancer a couple of years ago.  His PSA has been stable, but he has  developed CA of the bladder.  The rationale, the risk and complication of  transurethral resection of bladder tumor were fully discussed with Mr. Sepulveda and his wife.  They agree and they are going to be preoped today.        EOR/IN dd: 02/22/2019 12:24:45 (CST)   td: 02/22/2019 16:35:01 (CST)  Doc ID #[id number]   Job ID #[id number]    CC:            HPI  Review of Systems   Constitutional: Negative for activity change and appetite change.   HENT: Negative.    Eyes: Negative for discharge.   Respiratory: Negative for cough and shortness of breath.    Cardiovascular: Negative for chest pain and palpitations.   Gastrointestinal: Negative for abdominal distention,  abdominal pain, constipation and vomiting.   Genitourinary: Negative for discharge, dysuria, flank pain, frequency, hematuria, testicular pain and urgency.   Musculoskeletal: Negative for arthralgias.   Skin: Negative for rash.   Neurological: Negative for dizziness.   Psychiatric/Behavioral: The patient is not nervous/anxious.        Objective:      Physical Exam   Constitutional: He appears well-developed and well-nourished.   HENT:   Head: Normocephalic.   Eyes: Pupils are equal, round, and reactive to light.   Neck: Normal range of motion.   Cardiovascular: Normal rate.    Pulmonary/Chest: Effort normal.   Abdominal: Soft. He exhibits no distension and no mass. There is no tenderness.   Genitourinary: Rectum normal, prostate normal and penis normal. Rectal exam shows no external hemorrhoid, no mass and no tenderness. Prostate is not enlarged and not tender. Right testis shows no mass and no tenderness. Left testis shows no mass and no tenderness. No discharge found.       Musculoskeletal: Normal range of motion.   Neurological: He is alert.   Skin: Skin is warm.     Psychiatric: He has a normal mood and affect.       Assessment:       1. H/O transurethral destruction of bladder lesion    2. Lesion of bladder    3. Pre-op testing        Plan:       H/O transurethral destruction of bladder lesion  -     POCT URINE DIPSTICK WITHOUT MICROSCOPE  -     Case Request Operating Room: TURBT (TRANSURETHRAL RESECTION OF BLADDER TUMOR)  -     Place in Outpatient; Standing  -     Place sequential compression device; Standing    Lesion of bladder    Pre-op testing  -     CBC auto differential; Future; Expected date: 02/22/2019    Other orders  -     IP VTE HIGH RISK PATIENT; Standing  -     ciprofloxacin (CIPRO)400mg/200ml D5W IVPB 400 mg    For TURBT on 3/13/19

## 2019-03-01 ENCOUNTER — PATIENT MESSAGE (OUTPATIENT)
Dept: SURGERY | Facility: HOSPITAL | Age: 64
End: 2019-03-01

## 2019-03-01 ENCOUNTER — PATIENT MESSAGE (OUTPATIENT)
Dept: FAMILY MEDICINE | Facility: CLINIC | Age: 64
End: 2019-03-01

## 2019-03-02 ENCOUNTER — PATIENT MESSAGE (OUTPATIENT)
Dept: SURGERY | Facility: HOSPITAL | Age: 64
End: 2019-03-02

## 2019-03-04 NOTE — TELEPHONE ENCOUNTER
Patient received a denial for cystoscope ordered by Dr. Santos. Eh told him to get referral for retroactive coverage.

## 2019-03-06 ENCOUNTER — PATIENT MESSAGE (OUTPATIENT)
Dept: UROLOGY | Facility: CLINIC | Age: 64
End: 2019-03-06

## 2019-03-06 ENCOUNTER — TELEPHONE (OUTPATIENT)
Dept: UROLOGY | Facility: CLINIC | Age: 64
End: 2019-03-06

## 2019-03-06 NOTE — TELEPHONE ENCOUNTER
----- Message from Socrates Romero sent at 3/6/2019  3:32 PM CST -----  Contact: self   Patient need to speak with a nurse regarding a Approval for upcoming surgery, please call back at [phone] (home)

## 2019-03-06 NOTE — TELEPHONE ENCOUNTER
Informed pt that the referral is put in. I will contact someone in our billing dept to work the referral and let him know the outcome. Pt verbalized understanding.

## 2019-03-07 ENCOUNTER — DOCUMENTATION ONLY (OUTPATIENT)
Dept: FAMILY MEDICINE | Facility: CLINIC | Age: 64
End: 2019-03-07

## 2019-03-07 NOTE — PROGRESS NOTES
Pre-Visit Chart Review  For Appointment Scheduled on 3-8-19    Health Maintenance Due   Topic Date Due    TETANUS VACCINE  02/08/1972    Zoster Vaccine  02/08/2014    Colonoscopy  03/26/2018    Influenza Vaccine  08/01/2018    Pneumococcal Vaccine (65+ High/Highest Risk) (1 of 2 - PCV13) 02/08/2019    Abdominal Aortic Aneurysm Screening  02/08/2019

## 2019-03-08 ENCOUNTER — OFFICE VISIT (OUTPATIENT)
Dept: FAMILY MEDICINE | Facility: CLINIC | Age: 64
End: 2019-03-08
Attending: FAMILY MEDICINE
Payer: MEDICARE

## 2019-03-08 ENCOUNTER — HOSPITAL ENCOUNTER (OUTPATIENT)
Dept: RADIOLOGY | Facility: CLINIC | Age: 64
Discharge: HOME | End: 2019-03-08
Attending: FAMILY MEDICINE
Payer: MEDICARE

## 2019-03-08 ENCOUNTER — PATIENT MESSAGE (OUTPATIENT)
Dept: FAMILY MEDICINE | Facility: CLINIC | Age: 64
End: 2019-03-08

## 2019-03-08 ENCOUNTER — PATIENT MESSAGE (OUTPATIENT)
Dept: SURGERY | Facility: HOSPITAL | Age: 64
End: 2019-03-08

## 2019-03-08 VITALS
RESPIRATION RATE: 12 BRPM | HEART RATE: 66 BPM | HEIGHT: 69 IN | SYSTOLIC BLOOD PRESSURE: 126 MMHG | OXYGEN SATURATION: 97 % | WEIGHT: 172.38 LBS | TEMPERATURE: 98 F | DIASTOLIC BLOOD PRESSURE: 78 MMHG | BODY MASS INDEX: 25.53 KG/M2

## 2019-03-08 DIAGNOSIS — C67.9 MALIGNANT NEOPLASM OF URINARY BLADDER, UNSPECIFIED SITE: Primary | ICD-10-CM

## 2019-03-08 DIAGNOSIS — Z01.818 PREOP EXAMINATION: ICD-10-CM

## 2019-03-08 DIAGNOSIS — E78.5 HYPERLIPIDEMIA, UNSPECIFIED HYPERLIPIDEMIA TYPE: ICD-10-CM

## 2019-03-08 DIAGNOSIS — C61 PROSTATE CANCER: ICD-10-CM

## 2019-03-08 DIAGNOSIS — K44.9 GASTROESOPHAGEAL REFLUX DISEASE WITH HIATAL HERNIA: ICD-10-CM

## 2019-03-08 DIAGNOSIS — E04.2 NONTOXIC MULTINODULAR GOITER: ICD-10-CM

## 2019-03-08 DIAGNOSIS — K21.9 GASTROESOPHAGEAL REFLUX DISEASE WITH HIATAL HERNIA: ICD-10-CM

## 2019-03-08 PROCEDURE — 1101F PT FALLS ASSESS-DOCD LE1/YR: CPT | Mod: CPTII,S$GLB,, | Performed by: FAMILY MEDICINE

## 2019-03-08 PROCEDURE — 93005 ELECTROCARDIOGRAM TRACING: CPT | Mod: S$GLB,,, | Performed by: FAMILY MEDICINE

## 2019-03-08 PROCEDURE — 71046 X-RAY EXAM CHEST 2 VIEWS: CPT | Mod: TC,FY,PO

## 2019-03-08 PROCEDURE — 3074F SYST BP LT 130 MM HG: CPT | Mod: CPTII,S$GLB,, | Performed by: FAMILY MEDICINE

## 2019-03-08 PROCEDURE — 3078F PR MOST RECENT DIASTOLIC BLOOD PRESSURE < 80 MM HG: ICD-10-PCS | Mod: CPTII,S$GLB,, | Performed by: FAMILY MEDICINE

## 2019-03-08 PROCEDURE — 93010 ELECTROCARDIOGRAM REPORT: CPT | Mod: S$GLB,,, | Performed by: INTERNAL MEDICINE

## 2019-03-08 PROCEDURE — 99215 PR OFFICE/OUTPT VISIT, EST, LEVL V, 40-54 MIN: ICD-10-PCS | Mod: S$GLB,,, | Performed by: FAMILY MEDICINE

## 2019-03-08 PROCEDURE — 99215 OFFICE O/P EST HI 40 MIN: CPT | Mod: S$GLB,,, | Performed by: FAMILY MEDICINE

## 2019-03-08 PROCEDURE — 99999 PR PBB SHADOW E&M-EST. PATIENT-LVL IV: ICD-10-PCS | Mod: PBBFAC,,, | Performed by: FAMILY MEDICINE

## 2019-03-08 PROCEDURE — 99999 PR PBB SHADOW E&M-EST. PATIENT-LVL IV: CPT | Mod: PBBFAC,,, | Performed by: FAMILY MEDICINE

## 2019-03-08 PROCEDURE — 3078F DIAST BP <80 MM HG: CPT | Mod: CPTII,S$GLB,, | Performed by: FAMILY MEDICINE

## 2019-03-08 PROCEDURE — 71046 XR CHEST PA AND LATERAL: ICD-10-PCS | Mod: 26,,, | Performed by: RADIOLOGY

## 2019-03-08 PROCEDURE — 1101F PR PT FALLS ASSESS DOC 0-1 FALLS W/OUT INJ PAST YR: ICD-10-PCS | Mod: CPTII,S$GLB,, | Performed by: FAMILY MEDICINE

## 2019-03-08 PROCEDURE — 71046 X-RAY EXAM CHEST 2 VIEWS: CPT | Mod: 26,,, | Performed by: RADIOLOGY

## 2019-03-08 PROCEDURE — 93010 EKG 12-LEAD: ICD-10-PCS | Mod: S$GLB,,, | Performed by: INTERNAL MEDICINE

## 2019-03-08 PROCEDURE — 3074F PR MOST RECENT SYSTOLIC BLOOD PRESSURE < 130 MM HG: ICD-10-PCS | Mod: CPTII,S$GLB,, | Performed by: FAMILY MEDICINE

## 2019-03-08 PROCEDURE — 3008F BODY MASS INDEX DOCD: CPT | Mod: CPTII,S$GLB,, | Performed by: FAMILY MEDICINE

## 2019-03-08 PROCEDURE — 93005 EKG 12-LEAD: ICD-10-PCS | Mod: S$GLB,,, | Performed by: FAMILY MEDICINE

## 2019-03-08 PROCEDURE — 3008F PR BODY MASS INDEX (BMI) DOCUMENTED: ICD-10-PCS | Mod: CPTII,S$GLB,, | Performed by: FAMILY MEDICINE

## 2019-03-08 NOTE — Clinical Note
Clinically he looks clear for surgery assuming lab, cxr, and ekg are okay (ekg looks fine to me but cardiology review is pending.)

## 2019-03-08 NOTE — PROGRESS NOTES
Subjective:       Patient ID: Edwin Sepulevda III is a 64 y.o. male.    Chief Complaint: Pre-op Exam (Dr Santos )    64-year-old male with a simple but complex problem.  He is status post beam radiation for prostate cancer and recently passed blood clots in the urine.  This occurred after an episode of constipation.  He went to the emergency room where he was treated with antibiotics but told to follow up with Urology.  His urologist is Dr. Santos who recently moved from here to [place] so he went to see him at [facility] where he had a cystoscope, CT scan, and biopsies.  I do not have access to those results in epic.  The patient tells me he was told that he had a small bladder tumor with spots with abnormal vessels in several areas throughout the bladder.  I am capable of seeing some of Dr. Santos telephone messages in epic and there is mention of transitional cell carcinoma and multiple foci of tumor.  The patient is scheduled for March 13th to have a cystoscope with a trans urethral resection of the bladder tumor followed by BCG treatment after healing.  The patient has a history of reflux, hyperlipidemia, kidney stones and multinodular goiter as well as the prostate and bladder cancer.  I have not seen him in approximately three years.  Complicating matters is a change in insurance to [employer] and apparently he will not be covered in [place] and Dr. Santos is no longer working in [place].  He may need to be transferred to one of the local urologist to carry out the surgery.    Past Medical History:  8/14/2011: Family history of malignant neoplasm of prostate  No date: Gastroesophageal reflux disease with hiatal hernia  1/7/2016: History of prostate cancer  No date: Hyperlipidemia  No date: Kidney stones  No date: Multinodular goiter      Comment:  no medications  10/20/2014: Prostate cancer  No date: Prostate disorder      Comment:  Dr Santos     Past Surgical History:  No  date: APPENDECTOMY  3/26/2008: COLONOSCOPY      Comment:  5-10 year recheck per Dr. Borja (normal exam)  10/7/2014: CYSTOSCOPY; N/A      Comment:  Performed by Adin Santos MD at [facility] OR  2019: CYSTOSCOPY, FLEXIBLE; N/A      Comment:  Performed by Adin Santos MD at [facility] OR  No date: esphogus scope  No date: leg repair; Left  No date: ORCHIECTOMY  No date: spermatocele repair  10/7/2014: TRANSRECTAL ULTRASOUND GUIDED PROSTATE BIOPSY; Bilateral      Comment:  Performed by Adin Santos MD at [facility] OR    Review of patient's family history indicates:  Problem: Hypertension      Relation: Mother          Age of Onset: (Not Specified)  Problem: COPD      Relation: Father          Age of Onset: (Not Specified)  Problem: Cancer      Relation: Father          Age of Onset: (Not Specified)          Comment: prostate  Problem: Hypertension      Relation: Sister          Age of Onset: (Not Specified)  Problem: Diabetes      Relation: Sister          Age of Onset: (Not Specified)  Problem: Hyperlipidemia      Relation: Sister          Age of Onset: (Not Specified)  Problem: Cancer      Relation: Maternal Grandmother          Age of Onset: (Not Specified)  Problem: Cancer      Relation: Paternal Grandfather          Age of Onset: (Not Specified)          Comment: prostate    Social History    Tobacco Use      Smoking status: Former Smoker        Quit date: 1976        Years since quittin.1      Smokeless tobacco: Never Used    Alcohol use: Yes      Alcohol/week: 1.8 oz      Types: 3 Glasses of wine per week    Drug use: No    Current Outpatient Medications on File Prior to Visit:  (DISCONTINUED) ciprofloxacin HCl (CIPRO) 500 MG tablet, Take 500 mg by mouth 2 (two) times daily., Disp: , Rfl:     No current facility-administered medications on file prior to visit.     TETANUS VACCINE due on 1972  Zoster Vaccine due on 2014  Colonoscopy due on 2018  Influenza Vaccine due on  08/01/2018  Pneumococcal Vaccine (65+ High/Highest Risk)(1 of 2 - PCV13) due on 02/08/2019  Abdominal Aortic Aneurysm Screening due on 02/08/2019        Review of Systems   Constitutional: Positive for activity change. Negative for unexpected weight change.   HENT: Negative for hearing loss, rhinorrhea and trouble swallowing.    Eyes: Negative for discharge and visual disturbance.   Respiratory: Negative for chest tightness and wheezing.    Cardiovascular: Negative for chest pain and palpitations.   Gastrointestinal: Negative for blood in stool, constipation, diarrhea and vomiting.   Endocrine: Negative for polydipsia and polyuria.   Genitourinary: Positive for hematuria. Negative for difficulty urinating and urgency.   Musculoskeletal: Negative for arthralgias, joint swelling and neck pain.   Neurological: Negative for weakness and headaches.   Psychiatric/Behavioral: Negative for confusion and dysphoric mood.       Objective:      Physical Exam   Constitutional: He is oriented to person, place, and time. He appears well-developed and well-nourished. No distress.   Good blood pressure control  Normal weight with a BMI of 25.5 is down 7.3 lb from his last visit with me April 20, 2016   HENT:   Head: Normocephalic and atraumatic.   Right Ear: External ear normal.   Left Ear: External ear normal.   Nose: Nose normal.   Mouth/Throat: Oropharynx is clear and moist. No oropharyngeal exudate.   Eyes: Conjunctivae and EOM are normal. Pupils are equal, round, and reactive to light. No scleral icterus.   Neck: Normal range of motion. Neck supple. No JVD present. No tracheal deviation present. No thyromegaly present.   Cardiovascular: Normal rate, regular rhythm and normal heart sounds. Exam reveals no gallop and no friction rub.   No murmur heard.  Carotid pulses 2+ no bruit   Pulmonary/Chest: Effort normal and breath sounds normal. No stridor. No respiratory distress. He has no wheezes. He has no rales. He exhibits no  tenderness.   Abdominal: Soft. Bowel sounds are normal. He exhibits no distension and no mass. There is no tenderness. There is no rebound and no guarding.   Musculoskeletal: Normal range of motion. He exhibits no edema.   Lymphadenopathy:     He has no cervical adenopathy.   Neurological: He is alert and oriented to person, place, and time. He has normal reflexes. He displays normal reflexes. No cranial nerve deficit or sensory deficit. He exhibits normal muscle tone.   Skin: Skin is warm and dry. No rash noted. He is not diaphoretic. No erythema.   Psychiatric: He has a normal mood and affect. His behavior is normal. Judgment and thought content normal.   Nursing note and vitals reviewed.      Assessment:       1. Malignant neoplasm of urinary bladder, unspecified site    2. Preop examination    3. Hyperlipidemia, unspecified hyperlipidemia type    4. Prostate cancer    5. Nontoxic multinodular goiter    6. Gastroesophageal reflux disease with hiatal hernia    7. BMI 25.0-25.9,adult        Plan:       1. Malignant neoplasm of urinary bladder, unspecified site  Possible transitional cell carcinoma but records are incomplete and that is undocumented    2. Preop examination  The patient is clinically clear for surgery.  We will get an EKG, chest x-ray, and basic lab work to confirm that and should be able to do so before surgery date if insurance authorization is obtained.  - X-Ray Chest PA And Lateral; Future  - IN OFFICE EKG 12-LEAD (to Muse)  - Basic metabolic panel; Future  - CBC auto differential; Future    3. Hyperlipidemia, unspecified hyperlipidemia type  Re-evaluation needed, will be deferred to a later date    4. Prostate cancer  Status post external beam radiation and followed by Urology    5. Nontoxic multinodular goiter  Followed by Dr. Maloney    6. Gastroesophageal reflux disease with hiatal hernia  Asymptomatic    7. BMI 25.0-25.9,adult  Good weight with no changes needed

## 2019-03-09 ENCOUNTER — PATIENT MESSAGE (OUTPATIENT)
Dept: SURGERY | Facility: HOSPITAL | Age: 64
End: 2019-03-09

## 2019-03-10 ENCOUNTER — PATIENT MESSAGE (OUTPATIENT)
Dept: SURGERY | Facility: HOSPITAL | Age: 64
End: 2019-03-10

## 2019-03-12 ENCOUNTER — PATIENT MESSAGE (OUTPATIENT)
Dept: SURGERY | Facility: HOSPITAL | Age: 64
End: 2019-03-12

## 2019-03-12 RX ORDER — CIPROFLOXACIN 500 MG/1
500 TABLET ORAL 2 TIMES DAILY
Qty: 15 TABLET | Refills: 0 | Status: SHIPPED | OUTPATIENT
Start: 2019-03-12 | End: 2019-03-20

## 2019-03-12 RX ORDER — TRAMADOL HYDROCHLORIDE AND ACETAMINOPHEN 37.5; 325 MG/1; MG/1
1 TABLET, FILM COATED ORAL EVERY 6 HOURS PRN
Qty: 15 TABLET | Refills: 0 | Status: SHIPPED | OUTPATIENT
Start: 2019-03-12 | End: 2019-04-04

## 2019-03-13 ENCOUNTER — HOSPITAL ENCOUNTER (OUTPATIENT)
Facility: HOSPITAL | Age: 64
Discharge: HOME | End: 2019-03-13
Attending: UROLOGY | Admitting: UROLOGY
Payer: MEDICARE

## 2019-03-13 ENCOUNTER — PATIENT MESSAGE (OUTPATIENT)
Dept: SURGERY | Facility: HOSPITAL | Age: 64
End: 2019-03-13

## 2019-03-13 ENCOUNTER — ANESTHESIA (OUTPATIENT)
Dept: SURGERY | Facility: HOSPITAL | Age: 64
End: 2019-03-13
Payer: MEDICARE

## 2019-03-13 VITALS
RESPIRATION RATE: 16 BRPM | DIASTOLIC BLOOD PRESSURE: 92 MMHG | SYSTOLIC BLOOD PRESSURE: 152 MMHG | OXYGEN SATURATION: 96 % | TEMPERATURE: 98 F | HEART RATE: 71 BPM

## 2019-03-13 DIAGNOSIS — N32.9 LESION OF BLADDER: ICD-10-CM

## 2019-03-13 DIAGNOSIS — Z98.890 H/O TRANSURETHRAL DESTRUCTION OF BLADDER LESION: ICD-10-CM

## 2019-03-13 PROCEDURE — 25000003 PHARM REV CODE 250: Performed by: NURSE ANESTHETIST, CERTIFIED REGISTERED

## 2019-03-13 PROCEDURE — S0028 INJECTION, FAMOTIDINE, 20 MG: HCPCS | Performed by: ANESTHESIOLOGY

## 2019-03-13 PROCEDURE — 27201423 OPTIME MED/SURG SUP & DEVICES STERILE SUPPLY: Performed by: UROLOGY

## 2019-03-13 PROCEDURE — 25000003 PHARM REV CODE 250: Performed by: ANESTHESIOLOGY

## 2019-03-13 PROCEDURE — 63600175 PHARM REV CODE 636 W HCPCS: Performed by: UROLOGY

## 2019-03-13 PROCEDURE — 71000015 HC POSTOP RECOV 1ST HR: Performed by: UROLOGY

## 2019-03-13 PROCEDURE — 88307 TISSUE EXAM BY PATHOLOGIST: CPT | Performed by: PATHOLOGY

## 2019-03-13 PROCEDURE — 37000008 HC ANESTHESIA 1ST 15 MINUTES: Performed by: UROLOGY

## 2019-03-13 PROCEDURE — 63600175 PHARM REV CODE 636 W HCPCS: Performed by: NURSE ANESTHETIST, CERTIFIED REGISTERED

## 2019-03-13 PROCEDURE — 88307 TISSUE SPECIMEN TO PATHOLOGY - SURGERY: ICD-10-PCS | Mod: 26,,, | Performed by: PATHOLOGY

## 2019-03-13 PROCEDURE — 52234 PR CYSTOURETHROSCOPY,FULGUR 0.5-2 CM LESN: ICD-10-PCS | Mod: ,,, | Performed by: UROLOGY

## 2019-03-13 PROCEDURE — 36000706: Performed by: UROLOGY

## 2019-03-13 PROCEDURE — D9220A PRA ANESTHESIA: ICD-10-PCS | Mod: ANES,,, | Performed by: ANESTHESIOLOGY

## 2019-03-13 PROCEDURE — 52234 CYSTOSCOPY AND TREATMENT: CPT | Mod: ,,, | Performed by: UROLOGY

## 2019-03-13 PROCEDURE — 36000707: Performed by: UROLOGY

## 2019-03-13 PROCEDURE — 37000009 HC ANESTHESIA EA ADD 15 MINS: Performed by: UROLOGY

## 2019-03-13 PROCEDURE — 71000033 HC RECOVERY, INTIAL HOUR: Performed by: UROLOGY

## 2019-03-13 PROCEDURE — D9220A PRA ANESTHESIA: Mod: CRNA,,, | Performed by: NURSE ANESTHETIST, CERTIFIED REGISTERED

## 2019-03-13 PROCEDURE — 25000003 PHARM REV CODE 250: Performed by: UROLOGY

## 2019-03-13 PROCEDURE — D9220A PRA ANESTHESIA: Mod: ANES,,, | Performed by: ANESTHESIOLOGY

## 2019-03-13 PROCEDURE — C1729 CATH, DRAINAGE: HCPCS | Performed by: UROLOGY

## 2019-03-13 PROCEDURE — 88307 TISSUE EXAM BY PATHOLOGIST: CPT | Mod: 26,,, | Performed by: PATHOLOGY

## 2019-03-13 PROCEDURE — 63600175 PHARM REV CODE 636 W HCPCS: Performed by: ANESTHESIOLOGY

## 2019-03-13 PROCEDURE — D9220A PRA ANESTHESIA: ICD-10-PCS | Mod: CRNA,,, | Performed by: NURSE ANESTHETIST, CERTIFIED REGISTERED

## 2019-03-13 PROCEDURE — 71000016 HC POSTOP RECOV ADDL HR: Performed by: UROLOGY

## 2019-03-13 RX ORDER — PROPOFOL 10 MG/ML
VIAL (ML) INTRAVENOUS
Status: DISCONTINUED | OUTPATIENT
Start: 2019-03-13 | End: 2019-03-13

## 2019-03-13 RX ORDER — CIPROFLOXACIN 2 MG/ML
400 INJECTION, SOLUTION INTRAVENOUS
Status: COMPLETED | OUTPATIENT
Start: 2019-03-13 | End: 2019-03-13

## 2019-03-13 RX ORDER — OXYBUTYNIN CHLORIDE 5 MG/1
10 TABLET ORAL ONCE
Status: COMPLETED | OUTPATIENT
Start: 2019-03-13 | End: 2019-03-13

## 2019-03-13 RX ORDER — MORPHINE SULFATE 2 MG/ML
2 INJECTION, SOLUTION INTRAMUSCULAR; INTRAVENOUS EVERY 5 MIN PRN
Status: DISCONTINUED | OUTPATIENT
Start: 2019-03-13 | End: 2019-03-13 | Stop reason: HOSPADM

## 2019-03-13 RX ORDER — ROCURONIUM BROMIDE 10 MG/ML
INJECTION, SOLUTION INTRAVENOUS
Status: DISCONTINUED | OUTPATIENT
Start: 2019-03-13 | End: 2019-03-13

## 2019-03-13 RX ORDER — MIDAZOLAM HYDROCHLORIDE 1 MG/ML
INJECTION, SOLUTION INTRAMUSCULAR; INTRAVENOUS
Status: DISCONTINUED | OUTPATIENT
Start: 2019-03-13 | End: 2019-03-13

## 2019-03-13 RX ORDER — DIPHENHYDRAMINE HYDROCHLORIDE 50 MG/ML
12.5 INJECTION INTRAMUSCULAR; INTRAVENOUS
Status: DISCONTINUED | OUTPATIENT
Start: 2019-03-13 | End: 2019-03-13 | Stop reason: HOSPADM

## 2019-03-13 RX ORDER — SODIUM CHLORIDE, SODIUM LACTATE, POTASSIUM CHLORIDE, CALCIUM CHLORIDE 600; 310; 30; 20 MG/100ML; MG/100ML; MG/100ML; MG/100ML
125 INJECTION, SOLUTION INTRAVENOUS CONTINUOUS
Status: DISCONTINUED | OUTPATIENT
Start: 2019-03-13 | End: 2019-03-13 | Stop reason: HOSPADM

## 2019-03-13 RX ORDER — ONDANSETRON 2 MG/ML
4 INJECTION INTRAMUSCULAR; INTRAVENOUS DAILY PRN
Status: DISCONTINUED | OUTPATIENT
Start: 2019-03-13 | End: 2019-03-13 | Stop reason: HOSPADM

## 2019-03-13 RX ORDER — LIDOCAINE HYDROCHLORIDE 20 MG/ML
JELLY TOPICAL
Status: DISCONTINUED | OUTPATIENT
Start: 2019-03-13 | End: 2019-03-13 | Stop reason: HOSPADM

## 2019-03-13 RX ORDER — MEPERIDINE HYDROCHLORIDE 50 MG/ML
INJECTION INTRAMUSCULAR; INTRAVENOUS; SUBCUTANEOUS
Status: DISCONTINUED | OUTPATIENT
Start: 2019-03-13 | End: 2019-03-13

## 2019-03-13 RX ORDER — FAMOTIDINE 10 MG/ML
INJECTION INTRAVENOUS
Status: DISCONTINUED
Start: 2019-03-13 | End: 2019-03-13 | Stop reason: HOSPADM

## 2019-03-13 RX ORDER — SUCCINYLCHOLINE CHLORIDE 20 MG/ML
INJECTION INTRAMUSCULAR; INTRAVENOUS
Status: DISCONTINUED | OUTPATIENT
Start: 2019-03-13 | End: 2019-03-13

## 2019-03-13 RX ORDER — LIDOCAINE HYDROCHLORIDE 10 MG/ML
1 INJECTION, SOLUTION EPIDURAL; INFILTRATION; INTRACAUDAL; PERINEURAL ONCE
Status: DISCONTINUED | OUTPATIENT
Start: 2019-03-13 | End: 2019-03-13 | Stop reason: HOSPADM

## 2019-03-13 RX ORDER — SODIUM CHLORIDE, SODIUM LACTATE, POTASSIUM CHLORIDE, CALCIUM CHLORIDE 600; 310; 30; 20 MG/100ML; MG/100ML; MG/100ML; MG/100ML
INJECTION, SOLUTION INTRAVENOUS CONTINUOUS
Status: DISCONTINUED | OUTPATIENT
Start: 2019-03-13 | End: 2019-03-13 | Stop reason: HOSPADM

## 2019-03-13 RX ORDER — FAMOTIDINE 10 MG/ML
20 INJECTION INTRAVENOUS ONCE
Status: COMPLETED | OUTPATIENT
Start: 2019-03-13 | End: 2019-03-13

## 2019-03-13 RX ADMIN — MIDAZOLAM HYDROCHLORIDE 2 MG: 1 INJECTION, SOLUTION INTRAMUSCULAR; INTRAVENOUS at 10:03

## 2019-03-13 RX ADMIN — OXYBUTYNIN CHLORIDE 10 MG: 5 TABLET ORAL at 12:03

## 2019-03-13 RX ADMIN — SODIUM CHLORIDE, POTASSIUM CHLORIDE, SODIUM LACTATE AND CALCIUM CHLORIDE: 600; 310; 30; 20 INJECTION, SOLUTION INTRAVENOUS at 08:03

## 2019-03-13 RX ADMIN — MORPHINE SULFATE 2 MG: 2 INJECTION, SOLUTION INTRAMUSCULAR; INTRAVENOUS at 11:03

## 2019-03-13 RX ADMIN — MEPERIDINE HYDROCHLORIDE 50 MG: 50 INJECTION INTRAMUSCULAR; INTRAVENOUS; SUBCUTANEOUS at 10:03

## 2019-03-13 RX ADMIN — SODIUM CHLORIDE, POTASSIUM CHLORIDE, SODIUM LACTATE AND CALCIUM CHLORIDE: 600; 310; 30; 20 INJECTION, SOLUTION INTRAVENOUS at 10:03

## 2019-03-13 RX ADMIN — SUGAMMADEX 200 MG: 100 INJECTION, SOLUTION INTRAVENOUS at 10:03

## 2019-03-13 RX ADMIN — MORPHINE SULFATE 2 MG: 2 INJECTION, SOLUTION INTRAMUSCULAR; INTRAVENOUS at 12:03

## 2019-03-13 RX ADMIN — SUCCINYLCHOLINE CHLORIDE 100 MG: 20 INJECTION, SOLUTION INTRAMUSCULAR; INTRAVENOUS at 10:03

## 2019-03-13 RX ADMIN — CIPROFLOXACIN 400 MG: 2 INJECTION, SOLUTION INTRAVENOUS at 10:03

## 2019-03-13 RX ADMIN — ROCURONIUM BROMIDE 10 MG: 10 INJECTION, SOLUTION INTRAVENOUS at 10:03

## 2019-03-13 RX ADMIN — FAMOTIDINE 20 MG: 10 INJECTION INTRAVENOUS at 08:03

## 2019-03-13 RX ADMIN — PROPOFOL 200 MG: 10 INJECTION, EMULSION INTRAVENOUS at 10:03

## 2019-03-13 NOTE — H&P
CHIEF COMPLAINT:  Bladder lesion.     HISTORY OF PRESENT ILLNESS:  Mr. Sepulveda was evaluated for gross hematuria  with a cystoscopic evaluation on 02/13/2019 and was found to have multiple  bladder lesions.  The patient had typical lesion of transitional cell  carcinoma in the left bladder wall with approximately 4 cm aggregate  surface.  He underwent a CT scan.  The CT scan did not show evidence of any  extra vesicle extension of this tumor.  Today, I explained to the patient  that the best treatment will be to proceed with a transurethral resection  of the tumor, also fulguration of small lesions with time to heal after  that and start on BCG therapy.  That will be done weekly for six weeks.   All the questions were answered at the patient's satisfaction.  The patient  wants to proceed with this on 03/13/2019 and the order for the case  requested in place in [facility] to be done at [facility] [place] [place].  Today's  urinalysis is completely clear.  The last PSA on January 2019 was 0.49.     It is to be noted that Mr. Sepulveda underwent radiation therapy for prostate  cancer a couple of years ago.  His PSA has been stable, but he has  developed CA of the bladder.  The rationale, the risk and complication of  transurethral resection of bladder tumor were fully discussed with Mr. Sepulveda and his wife.  They agree and they are going to be preoped today.           Review of Systems   Constitutional: Negative for activity change and appetite change.   HENT: Negative.    Eyes: Negative for discharge.   Respiratory: Negative for cough and shortness of breath.    Cardiovascular: Negative for chest pain and palpitations.   Gastrointestinal: Negative for abdominal distention, abdominal pain, constipation and vomiting.   Genitourinary: Negative for discharge, dysuria, flank pain, frequency, hematuria, testicular pain and urgency.   Musculoskeletal: Negative for arthralgias.   Skin: Negative for rash.   Neurological: Negative for  dizziness.   Psychiatric/Behavioral: The patient is not nervous/anxious.        Objective:   Physical Exam   Constitutional: He appears well-developed and well-nourished.   HENT:   Head: Normocephalic.   Eyes: Pupils are equal, round, and reactive to light.   Neck: Normal range of motion.   Cardiovascular: Normal rate.    Pulmonary/Chest: Effort normal.   Abdominal: Soft. He exhibits no distension and no mass. There is no tenderness.   Genitourinary: Rectum normal, prostate normal and penis normal. Rectal exam shows no external hemorrhoid, no mass and no tenderness. Prostate is not enlarged and not tender. Right testis shows no mass and no tenderness. Left testis shows no mass and no tenderness. No discharge found.       Musculoskeletal: Normal range of motion.   Neurological: He is alert.   Skin: Skin is warm.     Psychiatric: He has a normal mood and affect.       Assessment:   Bladder lesion      Plan: TURBT and fulguration of bladder lesions

## 2019-03-13 NOTE — TRANSFER OF CARE
Anesthesia Transfer of Care Note    Patient: Edwin Sepulveda III    Procedure(s) Performed: Procedure(s) (LRB):  TURBT (TRANSURETHRAL RESECTION OF BLADDER TUMOR) (N/A)    Patient location: PACU    Anesthesia Type: general    Transport from OR: Transported from OR on room air with adequate spontaneous ventilation    Post pain: adequate analgesia    Post assessment: no apparent anesthetic complications and tolerated procedure well    Post vital signs: stable    Level of consciousness: awake, alert and oriented    Nausea/Vomiting: no nausea/vomiting    Complications: none    Transfer of care protocol was followed      Last vitals:   Visit Vitals  /76 (BP Location: Left arm, Patient Position: Sitting)   Pulse 83   Temp 36.7 °C (98 °F) (Oral)   Resp 19   SpO2 95%

## 2019-03-13 NOTE — DISCHARGE INSTRUCTIONS
Discharge Instructions for Transurethral Resection of Bladder Tumor (TURBT)  You had a procedure called a resection of bladder tumor (surgery to remove a bladder tumor). During the surgery, a surgeon inserted a thin, lighted tube (cystoscope) into the bladder through the urethra (the part of your body that carries urine from the bladder to the outside of the body). The surgeon used a tool to either remove the cancer or burn it away with high-energy electricity.  Home care  · Take care of your catheter the way you were shown in the hospital. You will need to wash the tubing at least twice a day.  · Dont be alarmed by brownish or reddish blood or clots in your urine. This is a result of the procedure. However, call your doctor if the blood does not start to go away within 72 hours after you go home.  · Drink plenty of fluids during the day (enough to keep your urine very light colored). This will help keep a healthy flow of urine.  · Dont drive until the doctor says its OK.   · Dont return to work until the doctor says its OK.  · Dont do any heavy lifting for 3 weeks after the procedure.  ¨ Dont lift anything heavier than 8 pounds.  ¨ Dont lift weights.  ¨ Dont  infants or children.  · Dont mow the lawn or use a vacuum .  · Avoid constipation.  ¨ Use a laxative or stool softener as directed by your doctor.  ¨ Eat more high-fiber foods.  · Be sure to finish the antibiotics that your doctor prescribed.  · Once your catheter is removed, expect some blood in your urine and some burning when you urinate.  Follow-up care  Make a follow-up appointment, or as directed by your doctor.  When to call your healthcare provider  Call your healthcare provider right away if you have any of the following:  · Heavy bleeding or large blood clots in the urine  · Blood in the urine after 3 days  · Catheter falls out or stops draining  · Fever above 100.4°F (38°C) or shaking chills  · Trouble urinating  · Pain or  cramping in the abdomen that wont go away   Date Last Reviewed: 1/1/2017  © 7177-4941 [employer]. [address] [place] [address], [place] [address]. All rights reserved. This information is not intended as a substitute for professional medical care. Always follow your healthcare professional's instructions.        Discharge Instructions: Caring for Your Leg Bag  You are going home with a urinary catheter and collection device (drainage bag) in place. One type of collection device is called a leg bag. This is a smaller drainage bag that you can wear on your leg to collect urine during the day. The bag can fit under your clothing. You can move around with greater ease when using a leg bag instead of a larger collection bag.  You were shown how to care for your catheter in the hospital. This sheet will help you remember those steps when you are at home.  Home care  · Wash your hands thoroughly before and after you care for your catheter or collection device.  · Gather your supplies:  ¨ Alcohol wipes  ¨ Soap and water  ¨ Towel and washcloth  ¨ Leg strap and leg bag  · Use soap and water to wash the area where your catheter enters your body. Rinse well.  · Secure the bag don to your leg:  ¨ Position the leg band high on your thigh with the product label pointing away from your leg.  ¨ Stretch the leg band in place and fasten.  ¨ Place the catheter tubing over the bag and secure it. You may secure it with a Velcro tab or other method, depending on the product you use. Be sure to leave enough loop in the catheter above the leg band to avoid pulling on the tube.  ¨ Every 4 to 6 hours, reposition the band to prevent pressure from the elastic on your leg. You can do this by changing the bag to the other leg or by raising or lowering the leg band.  ¨ Wash the band as frequently as needed. You can hand wash and dry the leg band.  · Place the bag in the bag don.  · Clean the urine bag end of the catheter and your  catheter port with an alcohol wipe.  · Place a towel under the bag and port to keep urine from dripping onto your leg.  · Before connecting the outlet valve at the bottom of the bag to the catheter, make sure that it is firmly closed. Flip the valve upward toward the bag; it needs to snap firmly in place. Be sure not to tug on the tubing. Be gentle.  · Attach the urine bag to the end of the catheter; insert the connector snugly into the catheter port. You can avoid dribbling urine by bending the catheter tubing just below the tip and holding it while you disconnect it from the catheter. Be careful to keep the tip clean while connecting the leg bag tubing to the catheter--this keeps germs from getting into the system.  · Drain the bag when it is full. To drain the bag, flip the clamp downward. Direct the flexible outlet tube to control the flow of urine. You dont have to disconnect the leg bag from the catheter to empty it. Raise your leg up to the edge of the toilet to reach the leg bag. Then you can empty the bag directly into the toilet. This way, you wont need to bend over, which may be uncomfortable.  · Keep the leg bag clean. Rinse daily with equal parts water and vinegar to reduce odor and keep the bag free of germs.  · Remember to keep the drainage bag below the level of your bladder for proper drainage.  Follow-up  Make a follow-up appointment as directed by your healthcare provider.  When to call your healthcare provider  Call your healthcare provider right away if you have any of the following:  · Redness, swelling, or warmth around the catheter entry site  · Pus draining from your catheter entry site or into the catheter tubing and bag  · Blood, clots, or floating debris in the urine  · Nausea and vomiting  · Shaking chills  · Fever above 100.4°F (38°C)  · Pain that is not relieved by medicine   · Catheter that falls out or is dislodged   Date Last Reviewed: 1/1/2017  © 7803-5738 [employer] [address] [place] [address]. All rights reserved. This information is not intended as a substitute for professional medical care. Always follow your healthcare professional's instructions.        Discharge Instructions: After Your Surgery  Youve just had surgery. During surgery, you were given medicine called anesthesia to keep you relaxed and free of pain. After surgery, you may have some pain or nausea. This is common. Here are some tips for feeling better and getting well after surgery.     Stay on schedule with your medicine.   Going home  Your healthcare provider will show you how to take care of yourself when you go home. He or she will also answer your questions. Have an adult family member or friend drive you home. For the first 24 hours after your surgery:  · Do not drive or use heavy equipment.  · Do not make important decisions or sign legal papers.  · Do not drink alcohol.  · Have someone stay with you, if needed. He or she can watch for problems and help keep you safe.  Be sure to go to all follow-up visits with your healthcare provider. And rest after your surgery for as long as your healthcare provider tells you to.  Coping with pain  If you have pain after surgery, pain medicine will help you feel better. Take it as told, before pain becomes severe. Also, ask your healthcare provider or pharmacist about other ways to control pain. This might be with heat, ice, or relaxation. And follow any other instructions your surgeon or nurse gives you.  Tips for taking pain medicine  To get the best relief possible, remember these points:  · Pain medicines can upset your stomach. Taking them with a little food may help.  · Most pain relievers taken by mouth need at least 20 to 30 minutes to start to work.  · Taking medicine on a schedule can help you remember to take it. Try to time your medicine so that you can take it before starting an activity. This might be before you get dressed, go for  a walk, or sit down for dinner.  · Constipation is a common side effect of pain medicines. Call your healthcare provider before taking any medicines such as laxatives or stool softeners to help ease constipation. Also ask if you should skip any foods. Drinking lots of fluids and eating foods such as fruits and vegetables that are high in fiber can also help. Remember, do not take laxatives unless your surgeon has prescribed them.  · Drinking alcohol and taking pain medicine can cause dizziness and slow your breathing. It can even be deadly. Do not drink alcohol while taking pain medicine.  · Pain medicine can make you react more slowly to things. Do not drive or run machinery while taking pain medicine.  Your healthcare provider may tell you to take acetaminophen to help ease your pain. Ask him or her how much you are supposed to take each day. Acetaminophen or other pain relievers may interact with your prescription medicines or other over-the-counter (OTC) medicines. Some prescription medicines have acetaminophen and other ingredients. Using both prescription and OTC acetaminophen for pain can cause you to overdose. Read the labels on your OTC medicines with care. This will help you to clearly know the list of ingredients, how much to take, and any warnings. It may also help you not take too much acetaminophen. If you have questions or do not understand the information, ask your pharmacist or healthcare provider to explain it to you before you take the OTC medicine.  Managing nausea  Some people have an upset stomach after surgery. This is often because of anesthesia, pain, or pain medicine, or the stress of surgery. These tips will help you handle nausea and eat healthy foods as you get better. If you were on a special food plan before surgery, ask your healthcare provider if you should follow it while you get better. These tips may help:  · Do not push yourself to eat. Your body will tell you when to eat and how  much.  · Start off with clear liquids and soup. They are easier to digest.  · Next try semi-solid foods, such as mashed potatoes, applesauce, and gelatin, as you feel ready.  · Slowly move to solid foods. Dont eat fatty, rich, or spicy foods at first.  · Do not force yourself to have 3 large meals a day. Instead eat smaller amounts more often.  · Take pain medicines with a small amount of solid food, such as crackers or toast, to avoid nausea.     Call your surgeon if  · You still have pain an hour after taking medicine. The medicine may not be strong enough.  · You feel too sleepy, dizzy, or groggy. The medicine may be too strong.  · You have side effects like nausea, vomiting, or skin changes, such as rash, itching, or hives.       If you have obstructive sleep apnea  You were given anesthesia medicine during surgery to keep you comfortable and free of pain. After surgery, you may have more apnea spells because of this medicine and other medicines you were given. The spells may last longer than usual.   At home:  · Keep using the continuous positive airway pressure (CPAP) device when you sleep. Unless your healthcare provider tells you not to, use it when you sleep, day or night. CPAP is a common device used to treat obstructive sleep apnea.  · Talk with your provider before taking any pain medicine, muscle relaxants, or sedatives. Your provider will tell you about the possible dangers of taking these medicines.  Date Last Reviewed: 12/1/2016  © 4613-1376 [employer]. [address] [place] [address], [place] [address]. All rights reserved. This information is not intended as a substitute for professional medical care. Always follow your healthcare professional's instructions.

## 2019-03-13 NOTE — PLAN OF CARE
DISCHARGE TEACHING FOR LEG-BAG COMPLETE. 100CC OF BLOODY TINGED URINE IN BAG UPON DISCHARGE. EMPTIED.

## 2019-03-13 NOTE — OP NOTE
DATE OF PROCEDURE:  03/13/2019.    PREOPERATIVE DIAGNOSIS:  Bladder tumor.    POSTOPERATIVE DIAGNOSIS:  Bladder tumor.    PROCEDURE PERFORMED:  Transurethral resection of bladder tumor, fulguration  of bladder tumor.    ANESTHESIA:  General.    ESTIMATED BLOOD LOSS:  Minimal.    PROCEDURE IN DETAIL:  With the patient under satisfactory general  anesthesia and placed in lithotomy position, the genitals were prepped and  draped in the usual manner.  The urethra was treated with lidocaine gel 2%.   A #22-Pashto cystoscope was placed through urethra into the bladder.   Inspection of the pendulous and bulbar urethra were found to be  unremarkable.  The prostatic urethra shows changes from the previous  external beam radiation of the prostate for prostate cancer.  Inspection of  the bladder shows bladder lesions in the left side of the bladder closer to  the bladder neck toward the left above the left ureteral orifice.  These  are multiple lesions and an aggregate of approximately 4 cm.  Since the  location of these lesions is just on top of the obturator nerve, I did only  1 loop of resection for pathological diagnosis and the remaining of the  tumor was all fulgurated.  Only one time tissues were resected with the  loop.  The tissue was sent to the lab for pathological diagnosis.  Then,  the roller ball type of electrode was placed and the area was completely  fulgurated.  A good satisfactory destruction of the tumor was achieved.   Hemostasis was found to be satisfactory.  At this point, the bladder was  irrigated, clear return was obtained.  The bladder was emptied and the  resectoscope was removed.    The patient's bladder was catheterized with #22 Pashto 10 mL balloon  catheter.  The De Leon catheter was placed into the bladder.  The bladder was  irrigated and again clear return was obtained.  The De Leon catheter is a  22-Pashto coude and 10 mL of water were placed inside the De Leon catheter.   The patient tolerated  well the procedure, was extubated in the Operating  Room and transferred to the Recovery Room in satisfactory condition.        EOR/HN dd: 03/13/2019 11:13:34 (CDT)   td: 03/13/2019 11:58:42 (CDT)  Doc ID #[id number]   Job ID #[id number]    CC:

## 2019-03-13 NOTE — BRIEF OP NOTE
Brief Operative Note     Surgery Date: 3/13/2019    Surgeon:   Adin Santos MD     Pre-op Diagnosis:  H/O transurethral destruction of bladder lesion [Z98.890]  Lesion of bladder [N32.9]    Post-op Diagnosis:  Same    Procedure:  Procedure(s) (LRB):  TURBT (TRANSURETHRAL RESECTION OF BLADDER TUMOR) (N/A)    Anesthesia: General    Findings/Key Components:  Bladder lesions    Estimated Blood Loss: Minimal                                                                                                                           Discharge Summary    Admit Date: 3/13/2019     Attending Physician: Adin Santos MD     Discharge Physician: Adin Santos MD      Discharge Date: 3/13/2019    Treatments/Procedures: Procedure(s) (LRB):  TURBT (TRANSURETHRAL RESECTION OF BLADDER TUMOR) (N/A)  Final Diagnosis:Bladder lesions  Hospital Course: TURBT done as planned.    Disposition: Home or Self Care     Patient Instructions:     Current Discharge Medication List:         Edwin Sepulveda III   Home Medication Instructions TERESA:[id number]    Printed on:03/13/19 8086   Medication Information                      ciprofloxacin HCl (CIPRO) 500 MG tablet  Take 1 tablet (500 mg total) by mouth 2 (two) times daily. for 15 doses             tramadol-acetaminophen 37.5-325 mg (ULTRACET) 37.5-325 mg Tab  Take 1 tablet by mouth every 6 (six) hours as needed.                     Current Discharge Medication List      CONTINUE these medications which have NOT CHANGED    Details   ciprofloxacin HCl (CIPRO) 500 MG tablet Take 1 tablet (500 mg total) by mouth 2 (two) times daily. for 15 doses  Qty: 15 tablet, Refills: 0      tramadol-acetaminophen 37.5-325 mg (ULTRACET) 37.5-325 mg Tab Take 1 tablet by mouth every 6 (six) hours as needed.  Qty: 15 tablet, Refills: 0             Discharge Procedure Orders:    Discharge Procedure Orders   Diet Adult Regular     Activity as tolerated

## 2019-03-13 NOTE — ANESTHESIA POSTPROCEDURE EVALUATION
Anesthesia Post Evaluation    Patient: Edwin Sepulveda III    Procedure(s) Performed: Procedure(s) (LRB):  TURBT (TRANSURETHRAL RESECTION OF BLADDER TUMOR) (N/A)    Final Anesthesia Type: general  Patient location during evaluation: PACU  Patient participation: Yes- Able to Participate  Level of consciousness: awake and alert  Post-procedure vital signs: reviewed and stable  Pain management: adequate  Airway patency: patent  PONV status at discharge: No PONV  Anesthetic complications: no      Cardiovascular status: blood pressure returned to baseline  Respiratory status: unassisted  Hydration status: euvolemic  Follow-up not needed.        Visit Vitals  BP (!) 152/92   Pulse 71   Temp 36.7 °C (98 °F) (Oral)   Resp 16   SpO2 96%       Pain/Sanya Score: Pain Rating Prior to Med Admin: 8 (3/13/2019 12:01 PM)  Sanya Score: 10 (3/13/2019 12:15 PM)

## 2019-03-14 ENCOUNTER — TELEPHONE (OUTPATIENT)
Dept: UROLOGY | Facility: CLINIC | Age: 64
End: 2019-03-14

## 2019-03-14 ENCOUNTER — PATIENT MESSAGE (OUTPATIENT)
Dept: UROLOGY | Facility: CLINIC | Age: 64
End: 2019-03-14

## 2019-03-14 NOTE — TELEPHONE ENCOUNTER
----- Message from Guerita Cedeño sent at 3/14/2019 12:38 PM CDT -----  Type:  Patient Returning Call    Who Called: Patient  Who Left Message for Patient:  Jennifer  Does the patient know what this is regarding?:  yes  Best Call Back Number:  [phone] (home)     Additional Information:  Na

## 2019-03-14 NOTE — TELEPHONE ENCOUNTER
----- Message from RT Markus sent at 3/14/2019  8:48 AM CDT -----  Contact: pt    pt , requesting a call back soon seeking medical advise about the catheter, thanks.

## 2019-03-15 ENCOUNTER — CLINICAL SUPPORT (OUTPATIENT)
Dept: UROLOGY | Facility: CLINIC | Age: 64
End: 2019-03-15
Payer: MEDICARE

## 2019-03-15 ENCOUNTER — PATIENT MESSAGE (OUTPATIENT)
Dept: UROLOGY | Facility: CLINIC | Age: 64
End: 2019-03-15

## 2019-03-15 DIAGNOSIS — N32.9 LESION OF BLADDER: Primary | ICD-10-CM

## 2019-03-15 NOTE — PROGRESS NOTES
Per providers request, pt is here for catheter removal that was placed during surgery on 3/13/19. Approximately 100ml of clear, yellow in drainage bag. Drained 10ml of water out of balloon. Withdrew catheter without difficulty. No drainage or blood noted. Pt instructed to void within one hour of removal. If not able to void to come back to office or go to the ED. Also if pt develops any fever or notices a sudden flow of bright red blood in urine to go straight to the nearest ED. Pt verbalized understanding.

## 2019-03-16 ENCOUNTER — PATIENT MESSAGE (OUTPATIENT)
Dept: UROLOGY | Facility: CLINIC | Age: 64
End: 2019-03-16

## 2019-03-18 ENCOUNTER — PATIENT MESSAGE (OUTPATIENT)
Dept: UROLOGY | Facility: CLINIC | Age: 64
End: 2019-03-18

## 2019-03-18 ENCOUNTER — PATIENT MESSAGE (OUTPATIENT)
Dept: FAMILY MEDICINE | Facility: CLINIC | Age: 64
End: 2019-03-18

## 2019-03-18 RX ORDER — TAMSULOSIN HYDROCHLORIDE 0.4 MG/1
0.4 CAPSULE ORAL DAILY
Qty: 30 CAPSULE | Refills: 0 | Status: ON HOLD | OUTPATIENT
Start: 2019-03-18 | End: 2019-07-09 | Stop reason: HOSPADM

## 2019-03-20 ENCOUNTER — CLINICAL SUPPORT (OUTPATIENT)
Dept: UROLOGY | Facility: CLINIC | Age: 64
End: 2019-03-20
Payer: MEDICARE

## 2019-03-20 DIAGNOSIS — N32.9 LESION OF BLADDER: Primary | ICD-10-CM

## 2019-03-20 NOTE — PROGRESS NOTES
Per providers request, pt is here for catheter removal that was placed in ER. Approximately 50ml of clear, yellow urine in drainage bag. Drained 10ml of water out of balloon. Withdrew catheter without difficulty. No drainage or blood noted. Pt instructed to void within one hour of removal. If not able to void to come back to office or go to the ED. Also if pt develops any fever or notices a sudden flow of bright red blood in urine to go straight to the nearest ED. Pt verbalized understanding.

## 2019-03-21 ENCOUNTER — PATIENT OUTREACH (OUTPATIENT)
Dept: ADMINISTRATIVE | Facility: HOSPITAL | Age: 64
End: 2019-03-21

## 2019-03-21 DIAGNOSIS — Z13.6 SCREENING FOR AAA (ABDOMINAL AORTIC ANEURYSM): Primary | ICD-10-CM

## 2019-03-27 ENCOUNTER — PATIENT MESSAGE (OUTPATIENT)
Dept: FAMILY MEDICINE | Facility: CLINIC | Age: 64
End: 2019-03-27

## 2019-03-27 ENCOUNTER — PATIENT MESSAGE (OUTPATIENT)
Dept: UROLOGY | Facility: CLINIC | Age: 64
End: 2019-03-27

## 2019-03-29 ENCOUNTER — DOCUMENTATION ONLY (OUTPATIENT)
Dept: FAMILY MEDICINE | Facility: CLINIC | Age: 64
End: 2019-03-29

## 2019-03-29 NOTE — PROGRESS NOTES
Pre-Visit Chart Review  For Appointment Scheduled on 4-4-19    Health Maintenance Due   Topic Date Due    Zoster Vaccine  02/08/2014    TETANUS VACCINE  09/09/2015    Colonoscopy  03/26/2018    Influenza Vaccine  08/01/2018    Pneumococcal Vaccine (65+ High/Highest Risk) (1 of 2 - PCV13) 02/08/2019    Abdominal Aortic Aneurysm Screening  02/08/2019

## 2019-04-01 ENCOUNTER — PATIENT MESSAGE (OUTPATIENT)
Dept: FAMILY MEDICINE | Facility: CLINIC | Age: 64
End: 2019-04-01

## 2019-04-04 ENCOUNTER — OFFICE VISIT (OUTPATIENT)
Dept: FAMILY MEDICINE | Facility: CLINIC | Age: 64
End: 2019-04-04
Attending: FAMILY MEDICINE
Payer: MEDICARE

## 2019-04-04 ENCOUNTER — HOSPITAL ENCOUNTER (OUTPATIENT)
Dept: RADIOLOGY | Facility: CLINIC | Age: 64
Discharge: HOME | End: 2019-04-04
Attending: FAMILY MEDICINE
Payer: MEDICARE

## 2019-04-04 ENCOUNTER — PATIENT MESSAGE (OUTPATIENT)
Dept: FAMILY MEDICINE | Facility: CLINIC | Age: 64
End: 2019-04-04

## 2019-04-04 VITALS
WEIGHT: 172.19 LBS | OXYGEN SATURATION: 97 % | TEMPERATURE: 98 F | HEIGHT: 68 IN | DIASTOLIC BLOOD PRESSURE: 60 MMHG | RESPIRATION RATE: 12 BRPM | HEART RATE: 72 BPM | BODY MASS INDEX: 26.1 KG/M2 | SYSTOLIC BLOOD PRESSURE: 108 MMHG

## 2019-04-04 DIAGNOSIS — Z13.6 ENCOUNTER FOR ABDOMINAL AORTIC ANEURYSM (AAA) SCREENING: ICD-10-CM

## 2019-04-04 DIAGNOSIS — Z12.11 COLON CANCER SCREENING: ICD-10-CM

## 2019-04-04 DIAGNOSIS — C67.9 MALIGNANT NEOPLASM OF URINARY BLADDER, UNSPECIFIED SITE: ICD-10-CM

## 2019-04-04 DIAGNOSIS — E78.5 HYPERLIPIDEMIA, UNSPECIFIED HYPERLIPIDEMIA TYPE: ICD-10-CM

## 2019-04-04 DIAGNOSIS — Z00.00 ENCOUNTER FOR PREVENTIVE HEALTH EXAMINATION: Primary | ICD-10-CM

## 2019-04-04 DIAGNOSIS — Z23 IMMUNIZATION DUE: ICD-10-CM

## 2019-04-04 DIAGNOSIS — E04.2 NONTOXIC MULTINODULAR GOITER: ICD-10-CM

## 2019-04-04 DIAGNOSIS — C61 PROSTATE CANCER: ICD-10-CM

## 2019-04-04 PROCEDURE — 90670 PCV13 VACCINE IM: CPT | Mod: S$GLB,,, | Performed by: FAMILY MEDICINE

## 2019-04-04 PROCEDURE — 90670 PNEUMOCOCCAL CONJUGATE VACCINE 13-VALENT LESS THAN 5YO & GREATER THAN: ICD-10-PCS | Mod: S$GLB,,, | Performed by: FAMILY MEDICINE

## 2019-04-04 PROCEDURE — 99397 PER PM REEVAL EST PAT 65+ YR: CPT | Mod: 25,S$GLB,, | Performed by: FAMILY MEDICINE

## 2019-04-04 PROCEDURE — 3078F PR MOST RECENT DIASTOLIC BLOOD PRESSURE < 80 MM HG: ICD-10-PCS | Mod: CPTII,S$GLB,, | Performed by: FAMILY MEDICINE

## 2019-04-04 PROCEDURE — 99999 PR PBB SHADOW E&M-EST. PATIENT-LVL IV: ICD-10-PCS | Mod: PBBFAC,,, | Performed by: FAMILY MEDICINE

## 2019-04-04 PROCEDURE — 99999 PR PBB SHADOW E&M-EST. PATIENT-LVL IV: CPT | Mod: PBBFAC,,, | Performed by: FAMILY MEDICINE

## 2019-04-04 PROCEDURE — G0009 ADMIN PNEUMOCOCCAL VACCINE: HCPCS | Mod: 59,S$GLB,, | Performed by: FAMILY MEDICINE

## 2019-04-04 PROCEDURE — G0009 PNEUMOCOCCAL CONJUGATE VACCINE 13-VALENT LESS THAN 5YO & GREATER THAN: ICD-10-PCS | Mod: 59,S$GLB,, | Performed by: FAMILY MEDICINE

## 2019-04-04 PROCEDURE — 3078F DIAST BP <80 MM HG: CPT | Mod: CPTII,S$GLB,, | Performed by: FAMILY MEDICINE

## 2019-04-04 PROCEDURE — 3074F PR MOST RECENT SYSTOLIC BLOOD PRESSURE < 130 MM HG: ICD-10-PCS | Mod: CPTII,S$GLB,, | Performed by: FAMILY MEDICINE

## 2019-04-04 PROCEDURE — 3074F SYST BP LT 130 MM HG: CPT | Mod: CPTII,S$GLB,, | Performed by: FAMILY MEDICINE

## 2019-04-04 PROCEDURE — 99397 PR PREVENTIVE VISIT,EST,65 & OVER: ICD-10-PCS | Mod: 25,S$GLB,, | Performed by: FAMILY MEDICINE

## 2019-04-04 PROCEDURE — 76775 US EXAM ABDO BACK WALL LIM: CPT | Mod: TC,PO

## 2019-04-04 PROCEDURE — 76775 US ABDOMINAL AORTA: ICD-10-PCS | Mod: 26,,, | Performed by: RADIOLOGY

## 2019-04-04 PROCEDURE — 76775 US EXAM ABDO BACK WALL LIM: CPT | Mod: 26,,, | Performed by: RADIOLOGY

## 2019-04-04 NOTE — PROGRESS NOTES
Subjective:       Patient ID: Edwin Sepulveda III is a 64 y.o. male.    Chief Complaint: Annual Exam    64-year-old male coming in for a physical exam.  He was recently seen for preoperative clearance for a trans urethral resection of a bladder tumor that occurred on March 13th.  I it appears that the tumor was a little larger than expected.  It was removed with a loop and then the tumor base was fulgurated.  He had problems after removal of the catheter in could not urinate and had to be replaced for an extra two days but he did not see any bleeding and is doing well now.  Plans were to treat him with BCG afterwards but apparently there is a shortage and may require a backup treatment.  He is due for a colonoscopy his last one was done by Dr. Borja in March of 2008 with a 10 year recheck recommended.  He is due for a Prevnar 13 and he is due for a lipid panel.  During his workup for the bladder tumor extensive labs were done and those were all satisfactory and do not need to be repeated but he is due for a thyroid check with a history of a multinodular goiter.  History includes prostate cancer treated with or key ectomy and watchful waiting, the bladder tumor as noted, hyperlipidemia, GERD no longer taking medication in doing well without it, kidney stones and multinodular goiter.    Past Medical History:  8/14/2011: Family history of malignant neoplasm of prostate  No date: Gastroesophageal reflux disease with hiatal hernia  1/7/2016: History of prostate cancer  No date: Hyperlipidemia  No date: Kidney stones  No date: Multinodular goiter      Comment:  no medications  10/20/2014: Prostate cancer  No date: Prostate disorder      Comment:  Dr Santos     Past Surgical History:  No date: APPENDECTOMY  3/26/2008: COLONOSCOPY      Comment:  5-10 year recheck per Dr. Borja (normal exam)  10/7/2014: CYSTOSCOPY; N/A      Comment:  Performed by Adin Santos MD at [facility] OR  2/13/2019: CYSTOSCOPY, FLEXIBLE; N/A       Comment:  Performed by Adin Santos MD at [facility] OR  No date: esphogus scope  3/13/2019: FULGURATION, BLADDER; Left      Comment:  Performed by Adin Santos MD at [facility] OR  No date: leg repair; Left  No date: ORCHIECTOMY  No date: spermatocele repair  10/7/2014: TRANSRECTAL ULTRASOUND GUIDED PROSTATE BIOPSY; Bilateral      Comment:  Performed by [facility] MD at [facility] OR  3/13/2019: TURBT (TRANSURETHRAL RESECTION OF BLADDER TUMOR); N/A      Comment:  Performed by Adin Santos MD at [facility] OR    Review of patient's family history indicates:  Problem: Hypertension      Relation: Mother          Age of Onset: (Not Specified)  Problem: Stroke      Relation: Mother          Age of Onset: 94  Problem: COPD      Relation: Father          Age of Onset: (Not Specified)  Problem: Cancer      Relation: Father          Age of Onset: (Not Specified)          Comment: prostate  Problem: Hypertension      Relation: Sister          Age of Onset: (Not Specified)  Problem: Diabetes      Relation: Sister          Age of Onset: (Not Specified)  Problem: Hyperlipidemia      Relation: Sister          Age of Onset: (Not Specified)  Problem: Cancer      Relation: Maternal Grandmother          Age of Onset: (Not Specified)  Problem: Cancer      Relation: Paternal Grandfather          Age of Onset: (Not Specified)          Comment: prostate    Social History    Tobacco Use      Smoking status: Former Smoker        Quit date: 1976        Years since quittin.2      Smokeless tobacco: Never Used    Alcohol use: Yes      Alcohol/week: 1.8 oz      Types: 3 Glasses of wine per week    Drug use: No    Current Outpatient Medications on File Prior to Visit:  tamsulosin (FLOMAX) 0.4 mg Cap, Take 1 capsule (0.4 mg total) by mouth once daily., Disp: 30 capsule, Rfl: 0  (DISCONTINUED) tramadol-acetaminophen 37.5-325 mg (ULTRACET) 37.5-325 mg Tab, Take 1 tablet by mouth every 6 (six) hours as needed., Disp: 15 tablet, Rfl:  0    No current facility-administered medications on file prior to visit.     Zoster Vaccine due on 02/08/2014  TETANUS VACCINE due on 09/09/2015  Colonoscopy due on 03/26/2018  Influenza Vaccine due on 08/01/2018  Pneumococcal Vaccine (65+ High/Highest Risk)(1 of 2 - PCV13) due on 02/08/2019  Abdominal Aortic Aneurysm Screening due on 02/08/2019      Review of Systems   Constitutional: Negative for activity change and unexpected weight change.   HENT: Negative for hearing loss, rhinorrhea and trouble swallowing.    Eyes: Negative for discharge and visual disturbance.   Respiratory: Negative for chest tightness and wheezing.    Cardiovascular: Negative for chest pain and palpitations.   Gastrointestinal: Negative for blood in stool, constipation, diarrhea and vomiting.   Endocrine: Negative for polydipsia and polyuria.   Genitourinary: Positive for difficulty urinating and hematuria. Negative for urgency.   Musculoskeletal: Negative for arthralgias, joint swelling and neck pain.   Neurological: Negative for weakness and headaches.   Psychiatric/Behavioral: Negative for confusion and dysphoric mood.       Objective:      Physical Exam   Constitutional: He is oriented to person, place, and time. He appears well-developed and well-nourished. No distress.   Good blood pressure control  Normal weight with a BMI of 25.9 is down 0.2 lb from his March 8, 2019 visit   HENT:   Head: Normocephalic and atraumatic.   Right Ear: External ear normal.   Left Ear: External ear normal.   Nose: Nose normal.   Mouth/Throat: Oropharynx is clear and moist. No oropharyngeal exudate.   Eyes: Pupils are equal, round, and reactive to light. Conjunctivae and EOM are normal. No scleral icterus.   Neck: Normal range of motion. Neck supple. No JVD present. No tracheal deviation present. No thyromegaly present.   Cardiovascular: Normal rate, regular rhythm and normal heart sounds. Exam reveals no gallop and no friction rub.   No murmur  heard.  Carotid pulses 2+ without bruit   Pulmonary/Chest: Effort normal and breath sounds normal. No stridor. No respiratory distress. He has no wheezes. He has no rales. He exhibits no tenderness.   Abdominal: Soft. Bowel sounds are normal. He exhibits no distension and no mass. There is no tenderness. There is no rebound and no guarding. No hernia.   Musculoskeletal: Normal range of motion. He exhibits no edema, tenderness or deformity.   Lymphadenopathy:     He has no cervical adenopathy.   Neurological: He is alert and oriented to person, place, and time. He has normal reflexes. He displays normal reflexes. No cranial nerve deficit or sensory deficit. He exhibits normal muscle tone.   Skin: Skin is warm and dry. No rash noted. He is not diaphoretic. No erythema.   Psychiatric: He has a normal mood and affect. His behavior is normal. Judgment and thought content normal.   Nursing note and vitals reviewed.      Assessment:       1. Encounter for preventive health examination    2. Hyperlipidemia, unspecified hyperlipidemia type    3. Prostate cancer    4. Nontoxic multinodular goiter    5. Malignant neoplasm of urinary bladder, unspecified site    6. Encounter for abdominal aortic aneurysm (AAA) screening    7. Immunization due    8. Colon cancer screening    9. BMI 25.0-25.9,adult        Plan:       1. Encounter for preventive health examination  - TSH; Future  - Lipid panel; Future  CMP  Sodium   Date Value Ref Range Status   03/08/2019 141 136 - 145 mmol/L Final     Potassium   Date Value Ref Range Status   03/08/2019 4.9 3.5 - 5.1 mmol/L Final     Chloride   Date Value Ref Range Status   03/08/2019 104 95 - 110 mmol/L Final     CO2   Date Value Ref Range Status   03/08/2019 30 (H) 23 - 29 mmol/L Final     Glucose   Date Value Ref Range Status   03/08/2019 96 70 - 110 mg/dL Final     BUN, Bld   Date Value Ref Range Status   03/08/2019 15 8 - 23 mg/dL Final     Creatinine   Date Value Ref Range Status    03/08/2019 0.9 0.5 - 1.4 mg/dL Final     Calcium   Date Value Ref Range Status   03/08/2019 10.2 8.7 - 10.5 mg/dL Final     Total Protein   Date Value Ref Range Status   01/19/2019 6.8 6.0 - 8.4 g/dL Final     Albumin   Date Value Ref Range Status   01/19/2019 4.0 3.5 - 5.2 g/dL Final     Total Bilirubin   Date Value Ref Range Status   01/19/2019 0.4 0.1 - 1.0 mg/dL Final     Comment:     For infants and newborns, interpretation of results should be based  on gestational age, weight and in agreement with clinical  observations.  Premature Infant recommended reference ranges:  Up to 24 hours.............<8.0 mg/dL  Up to 48 hours............<12.0 mg/dL  3-5 days..................<15.0 mg/dL  6-29 days.................<15.0 mg/dL       Alkaline Phosphatase   Date Value Ref Range Status   01/19/2019 64 55 - 135 U/L Final     AST   Date Value Ref Range Status   01/19/2019 14 10 - 40 U/L Final     ALT   Date Value Ref Range Status   01/19/2019 14 10 - 44 U/L Final     Anion Gap   Date Value Ref Range Status   03/08/2019 7 (L) 8 - 16 mmol/L Final     eGFR if    Date Value Ref Range Status   03/08/2019 >60.0 >60 mL/min/1.73 m^2 Final     eGFR if non    Date Value Ref Range Status   03/08/2019 >60.0 >60 mL/min/1.73 m^2 Final     Comment:     Calculation used to obtain the estimated glomerular filtration  rate (eGFR) is the CKD-EPI equation.        Lab Results   Component Value Date    WBC 4.51 03/08/2019    HGB 15.4 03/08/2019    HCT 47.9 03/08/2019    MCV 93 03/08/2019     03/08/2019         2. Hyperlipidemia, unspecified hyperlipidemia type  Lipid panel will be done today await results  - Lipid panel; Future    3. Prostate cancer  Followed by Urology    4. Nontoxic multinodular goiter  - TSH; Future    5. Malignant neoplasm of urinary bladder, unspecified site  Followed by Urology plans for BCG but apparently it is in short supply    6. Encounter for abdominal aortic aneurysm (AAA)  screening  No aneurysm seen on a CT scan in 2014 but has not been evaluated since that time  - US Abdominal Aorta; Future    7. Immunization due  Given  - (In Office Administered) Pneumococcal Conjugate Vaccine (13 Valent) (IM)    8. Colon cancer screening  Referral to Dr. Borja  - Ambulatory referral to Gastroenterology    9. BMI 25.0-25.9,adult  Good weight with no changes needed

## 2019-04-05 ENCOUNTER — PATIENT MESSAGE (OUTPATIENT)
Dept: FAMILY MEDICINE | Facility: CLINIC | Age: 64
End: 2019-04-05

## 2019-04-11 ENCOUNTER — PATIENT MESSAGE (OUTPATIENT)
Dept: FAMILY MEDICINE | Facility: CLINIC | Age: 64
End: 2019-04-11

## 2019-04-11 ENCOUNTER — PATIENT MESSAGE (OUTPATIENT)
Dept: UROLOGY | Facility: CLINIC | Age: 64
End: 2019-04-11

## 2019-04-20 ENCOUNTER — PATIENT MESSAGE (OUTPATIENT)
Dept: UROLOGY | Facility: CLINIC | Age: 64
End: 2019-04-20

## 2019-05-02 ENCOUNTER — PATIENT MESSAGE (OUTPATIENT)
Dept: UROLOGY | Facility: CLINIC | Age: 64
End: 2019-05-02

## 2019-05-02 ENCOUNTER — PATIENT MESSAGE (OUTPATIENT)
Dept: FAMILY MEDICINE | Facility: CLINIC | Age: 64
End: 2019-05-02

## 2019-05-02 ENCOUNTER — PATIENT OUTREACH (OUTPATIENT)
Dept: ADMINISTRATIVE | Facility: HOSPITAL | Age: 64
End: 2019-05-02

## 2019-05-03 ENCOUNTER — TELEPHONE (OUTPATIENT)
Dept: UROLOGY | Facility: CLINIC | Age: 64
End: 2019-05-03

## 2019-05-03 ENCOUNTER — PATIENT MESSAGE (OUTPATIENT)
Dept: FAMILY MEDICINE | Facility: CLINIC | Age: 64
End: 2019-05-03

## 2019-05-03 NOTE — TELEPHONE ENCOUNTER
Responded via phone. Explained chemotherapy schedule ordered by provider. Explained to pt we are just waiting on insurance approval and he will get a call from scheduling when we get the insurance ok to start treatments. Pt verbalized understanding.

## 2019-05-03 NOTE — TELEPHONE ENCOUNTER
----- Message from Mohsen Galindo sent at 5/3/2019  9:28 AM CDT -----  Contact: Patient  Type:  Patient Returning Call    Who Called: Patient  Who Left Message for Patient:  Jen Francois  Does the patient know what this is regarding?:  See previous message  Best Call Back Number:  [phone]  Additional Information:  NA

## 2019-05-13 ENCOUNTER — TELEPHONE (OUTPATIENT)
Dept: GASTROENTEROLOGY | Facility: CLINIC | Age: 64
End: 2019-05-13

## 2019-05-13 ENCOUNTER — INFUSION (OUTPATIENT)
Dept: INFUSION THERAPY | Facility: HOSPITAL | Age: 64
End: 2019-05-13
Attending: UROLOGY
Payer: MEDICARE

## 2019-05-13 ENCOUNTER — PATIENT MESSAGE (OUTPATIENT)
Dept: FAMILY MEDICINE | Facility: CLINIC | Age: 64
End: 2019-05-13

## 2019-05-13 ENCOUNTER — PATIENT MESSAGE (OUTPATIENT)
Dept: UROLOGY | Facility: CLINIC | Age: 64
End: 2019-05-13

## 2019-05-13 VITALS
HEART RATE: 69 BPM | DIASTOLIC BLOOD PRESSURE: 82 MMHG | SYSTOLIC BLOOD PRESSURE: 142 MMHG | TEMPERATURE: 64 F | RESPIRATION RATE: 18 BRPM

## 2019-05-13 VITALS
HEART RATE: 71 BPM | SYSTOLIC BLOOD PRESSURE: 141 MMHG | TEMPERATURE: 97 F | RESPIRATION RATE: 20 BRPM | DIASTOLIC BLOOD PRESSURE: 82 MMHG

## 2019-05-13 DIAGNOSIS — C67.9 MALIGNANT NEOPLASM OF URINARY BLADDER, UNSPECIFIED SITE: ICD-10-CM

## 2019-05-13 PROCEDURE — 96409 CHEMO IV PUSH SNGL DRUG: CPT

## 2019-05-13 PROCEDURE — 51720 TREATMENT OF BLADDER LESION: CPT

## 2019-05-13 PROCEDURE — 63600175 PHARM REV CODE 636 W HCPCS: Mod: JG | Performed by: UROLOGY

## 2019-05-13 PROCEDURE — 25000003 PHARM REV CODE 250: Performed by: UROLOGY

## 2019-05-13 RX ADMIN — MITOMYCIN: 20 INJECTION, POWDER, LYOPHILIZED, FOR SOLUTION INTRAVENOUS at 11:05

## 2019-05-13 NOTE — TELEPHONE ENCOUNTER
----- Message from Karina Marks sent at 5/13/2019  2:39 PM CDT -----  Contact: Patient  Patient called and stated he can't urinate after Chemo (chemo was done in the bladder). He is going to [facility]. He can be contacted at [phone].    Thanks,  Karina

## 2019-05-13 NOTE — TELEPHONE ENCOUNTER
Pt called wanting to speak to Dr. Santos due to having urine retention after treatment today. Informed pt that Dr. Santos will not be back in office until Thursday 5/16. Pt states he wants to switch to Dr. Shepherd in [place] due to convenience. Also he would like a doctor that is more accessible. ER told him to follow up with a urologist this week due to urinary retention after Mitomycin bladder instillation that he had done at [facility] today 5/13. He does not think he wants to proceed with treatments and would like to talk to Dr. Beltran about this at his appointment. Informed pt that I would send a message to Dr. Beltran's staff to see if they can accommodate him this week for an appointment. Pt verbalized understanding.

## 2019-05-13 NOTE — NURSING
1115:  See document flow sheet for full assessment and discharge mode.  Using sterile tech and 14 Lithuanian cath. Ordered chemotherapy was instilled to bladder while patient supine in bed.  Patient stayed for 20 minutes post instillation and had no adverse effects.  Instructed patient  to hold chemotherapy up to 2 hours and then void.  Instructed patient to flush commode x 2 for the next 48 hours.  Patient verbalized understanding.-KS

## 2019-05-13 NOTE — TELEPHONE ENCOUNTER
Patient had 1st Mitomycin installation today.    He followed all instructions.  He started with a small flow, but became obstructed.  He went to [facility] ED.  De Leon catheter placed with some difficulty.    He is scheduled for Mitomycin again Monday.  He would like to switch urologist due to convenience and availability.  He is not sure he should proceed with chemo treatment.  Please review records, advise, appointment.

## 2019-05-14 ENCOUNTER — PATIENT MESSAGE (OUTPATIENT)
Dept: UROLOGY | Facility: CLINIC | Age: 64
End: 2019-05-14

## 2019-05-14 ENCOUNTER — TELEPHONE (OUTPATIENT)
Dept: UROLOGY | Facility: CLINIC | Age: 64
End: 2019-05-14

## 2019-05-14 NOTE — TELEPHONE ENCOUNTER
Scheduled for when?  I need to review ER record re: circumstances of retention, how much in bladder, etc.  Did NOT give date to remove gr  As well just started flomax today as reportedly did not have any until called in from office  Hold on any plans to remove gr until case reviewed further  Was his office visit set up    Set EP visit with me early June to plan cystoscopic surveillance and reassess voiding  Should remain on flomax until that time

## 2019-05-14 NOTE — TELEPHONE ENCOUNTER
Says get [facility] records... To advise of timing of removal. LIKELY 72 hrs. This assumed he was taking flomax as well. Given significant episode of retention, intravesical therapy, and only startng alpha blocker today which should be in system 5 days before voiding trial, please plan fill and pull Monday morning so he does not go into retention over the weekend, and if has problems can come back in at time we are both there (aka mon PM) in case new gr etc needed.

## 2019-05-14 NOTE — TELEPHONE ENCOUNTER
Looks like parisondra bladder tumor which was resected was low grade Ta so unsure why patient is getting [facility] instillations. Treatment for this is resection and would recommend no further intravesical therapy, but rather just cystoscopic surveillance.    Given his urinary retention, which may be related to instillation/traumatic cath, etc, would advise increasing fluid intake and making sure he is taking his flomax daily and has no constipation    Please get [facility] ER records and any ua done and see if they sent ucx to advise on timing of gr removal and voiding trial. (likely after gr hs been in for minumum 72 hours)  Cancel all further MMC instillations  Set EP visit with me early June to plan cystoscopic surveillance and reassess voiding  Should remain on flomax until that time

## 2019-05-14 NOTE — TELEPHONE ENCOUNTER
----- Message from RT Markus sent at 5/14/2019  7:10 AM CDT -----  Contact: pt    pt , requesting medical advise about flomax and the catheter, thanks.

## 2019-05-14 NOTE — TELEPHONE ENCOUNTER
Patient given Dr. Beltran's instructions.  Scheduled for [place] for catheter removal and follow up with Dr. Beltran..  Spoke with Sherry Owens RN to cancel all remaining installations.      Called Flomax to patient's pharmacy.      ED records from [facility] have been received and available for MD to review.  Urine culture prelim shows no growth.

## 2019-05-14 NOTE — TELEPHONE ENCOUNTER
Your note in this encounter says: advise on timing of gr removal and voiding trial. (likely after gr hs been in for minumum 72 hours)  This is why I scheduled for Friday, as >72 hours.  He is scheduled in mid June for follow up as you asked.   You have the ED  records to review.   So, you do not want catheter removed on Friday?

## 2019-05-15 ENCOUNTER — PATIENT MESSAGE (OUTPATIENT)
Dept: UROLOGY | Facility: CLINIC | Age: 64
End: 2019-05-15

## 2019-05-20 ENCOUNTER — CLINICAL SUPPORT (OUTPATIENT)
Dept: UROLOGY | Facility: CLINIC | Age: 64
End: 2019-05-20
Payer: MEDICARE

## 2019-05-20 ENCOUNTER — PATIENT MESSAGE (OUTPATIENT)
Dept: UROLOGY | Facility: CLINIC | Age: 64
End: 2019-05-20

## 2019-05-20 DIAGNOSIS — C61 MALIGNANT NEOPLASM OF PROSTATE: Primary | ICD-10-CM

## 2019-05-20 DIAGNOSIS — R33.9 URINARY RETENTION: ICD-10-CM

## 2019-05-20 PROCEDURE — 99499 NO LOS: ICD-10-PCS | Mod: S$GLB,,, | Performed by: UROLOGY

## 2019-05-20 PROCEDURE — 99499 UNLISTED E&M SERVICE: CPT | Mod: S$GLB,,, | Performed by: UROLOGY

## 2019-05-20 NOTE — PROGRESS NOTES
Patient here today to have fill and pull catheter removal.    10ml saline removed from catheter balloon.   Catheter bag contains 25ml clear/yellow urine.  Bladder filled with 180ml sterile water, as this is all patient could tolerate.    Catheter removed with no difficulty.  Patient provided with urinal.    Patient urinated 200ml urine into the urinal.  Patient instructed to drink 6-8 eight ounce glasses of water by noon today.  If unable to urinate by 2:30, he should return to clinic for bladder scan and possible replacement of catheter.

## 2019-05-24 ENCOUNTER — PATIENT MESSAGE (OUTPATIENT)
Dept: UROLOGY | Facility: CLINIC | Age: 64
End: 2019-05-24

## 2019-05-27 ENCOUNTER — TELEPHONE (OUTPATIENT)
Dept: UROLOGY | Facility: CLINIC | Age: 64
End: 2019-05-27

## 2019-05-27 NOTE — TELEPHONE ENCOUNTER
Good Morning,Mr. Sepulveda your scheduled appt on 6/14 @ 1115 am has been changed to 1045 am due to schedule changes please let us know if this interfers with your schedule.    Thanks Arina SCHILLING

## 2019-06-10 ENCOUNTER — PATIENT MESSAGE (OUTPATIENT)
Dept: UROLOGY | Facility: CLINIC | Age: 64
End: 2019-06-10

## 2019-06-11 ENCOUNTER — PATIENT MESSAGE (OUTPATIENT)
Dept: UROLOGY | Facility: CLINIC | Age: 64
End: 2019-06-11

## 2019-06-12 NOTE — PROGRESS NOTES
[facility]– [facility] New Patient/H&P:    Edwin Sepulveda III is a 64 y.o. male with history of prostate cancer and bladder cancer, and recent urinary retention after MMC instillation, transitioning care from Dr Santos    History of right orchiectomy for spermatocele. Grandfather with history of prostate cancer. Followed for annual psa screening and BPH since prior to 2012  PSA kym 1.9 to 3.03 2011 to 2012 and felt to have mild prostatitis in July 2012 for which he was treated with a course of Cipro.  Repeat PSA 5/29/13 was 3.11, and did then rise to 4.7 on 8/15/14.  Again, prostatitis was suspected and he was given Bactrim for 3 weeks.  Repeat PSA increased to 6.7 with 17% free in September 2014 after treatment with antibiotics  10/7/14:  Cystoscopy and prostate biopsy:  23.8 g prostate, trilobar obstruction with moderate median lobe and 3.5 cm prostatic urethral length, grade 2-3 trabeculations, no bladder lesions.  Pathology demonstrated high-volume Gildardo 6 prostate cancer in 7/12 cores.  Fiducial markers placed 11/19/14 and he underwent external beam radiation with Dr. Garcia.  5/7/5: ER with renal colic. KUB shows the patient has a 6-mm stone in the left kidney and 3 mm in the left upper pole and a very small stone on the right side. 10-15 years prior he had a stone  5/26/15: significant discomfort after ejaculation attributed to radiation inflammation in the prostate and bladder and reassured, psa 1.6 3/27/15, elected observation for nonobst stones, psa 0.88 8/7/15, 0.75 8/18/16    Continued semiannual psa screening after and had no urinary complaints, until returning in Jan 2019 with 3d GH. Urine was nit+ in ER at time of visit for GH and was given abx but obscured bc red as micro only with >100rbc and no wbc/bacteria so did not reflex to culture. Cytology negative  Cysto 2/13/19: 3 cm prostatic urethra with open bladder neck s/p radiation, g2 trabeculations, typical TCC lesions in the left bladder wall 4 cm  aggregated   CTU 2/13/19: Kidneys are normal in size and attenuation.  There are bilateral nonobstructing renal calculi ranging in size from 1 mm to 8 mm.  Kidneys enhance symmetrically.  No changes of hydronephrosis.  No perinephric inflammatory change.  Delayed images demonstrate the ureters to be normal in course and caliber. Air and stool throughout the loops of colon.  Scattered diverticula within the colon.  No mesenteric inflammatory change.  No CT evidence for bowel obstruction. The bladder is distended.  Prostate is mildly enlarged measuring 3.6 cm AP x 3.8 cm transverse.  Seminal vesicles and rectum unremarkable.  Surgical clips within the pelvis. No significant mesenteric or retroperitoneal lymphadenopathy. There is mild bone demineralization.  Bilateral spondylolysis at L5-S1 with a grade 1 anterolisthesis.  2/25/19 discussed proceeding with TURBT and induction course of BCG  3/13/19: TURBT Op report:  The prostatic urethra shows changes from the previous external beam radiation of the prostate for prostate cancer.  Inspection of the bladder shows bladder lesions in the left side of the bladder closer to  the bladder neck toward the left above the left ureteral orifice.  These are multiple lesions and an aggregate of approximately 4 cm.  Since the location of these lesions is just on top of the obturator nerve, I did only  1 loop of resection for pathological diagnosis and the remaining of the tumor was all fulgurated.  Only one time tissues were resected with the loop.  The tissue was sent to the lab for pathological diagnosis.  Then,  the roller ball type of electrode was placed and the area was completely fulgurated.  A good satisfactory destruction of the tumor was achieved. 22fr coude gr placed  PATH:   Low-grade papillary urothelial carcinoma. No evidence of lamina propria invasion identified. Muscularis propria is present and uninvolved tumor.  Gr removed POD 2, 3/15/19. Experienced urinary  retention afterwards. Then returned again for gr removal 3/20/19  Due for Csope (last 3/08)    Started MMC instillation on 5/13/19 at [facility]. Cathd with 14fr gr, ordered chemo instilled, observed for 20 mins, advised to hold for 2 hours and flush toilet x2.  He experienced urinary retention afterwards and went to [facility] and had gr placed (noted difficulty placing). Given 10d course of cipro  Called to transition care to [place], so I started him on flomax, cancelled further MMC instillations, and had him come in on 5/20/19 for fill and pull voiding trial. Filled to 180cc as felt at capacity, and voided 200cc clear urine.   Ref. Range 8/18/2016 08:14 2/20/2017 08:34 8/21/2017 09:05 3/8/2018 09:08 1/29/2019 11:22   PSA DIAGNOSTIC Latest Ref Range: 0.00 - 4.00 ng/mL 0.75 1.4 0.94 0.70 0.49     He returns today noting:  Had colonoscopy last month with Dr Borja with some evidence of radiation proctitis, and a small benign polyp which was removed.  No urine came out when catheter passed. And was nurses first time catheterizing  Patient felt like catheter was not all the way in and perhaps medicine instilled into prostate and caused inflammation leading to retention  At this time, voiding well, no blood in urine.   AUA SS 2/1, pleased  Ucx at time of ED  Visit negative.       Past Medical History:   Diagnosis Date    Family history of malignant neoplasm of prostate 8/14/2011    Gastroesophageal reflux disease with hiatal hernia     History of prostate cancer 1/7/2016    Hyperlipidemia     Kidney stones     Multinodular goiter     no medications    Prostate cancer 10/20/2014    Prostate disorder     Dr Santos        Past Surgical History:   Procedure Laterality Date    APPENDECTOMY      COLONOSCOPY  3/26/2008    5-10 year recheck per Dr. Borja (normal exam)    CYSTOSCOPY N/A 10/7/2014    [facility]    CYSTOSCOPY, FLEXIBLE N/A 2/13/2019    Performed  [facility] at [facility] OR    esphogus scope      FULGURATION, BLADDER Left 3/13/2019    [facility] at [facility] OR    leg repair Left     ORCHIECTOMY      spermatocele repair      TRANSRECTAL ULTRASOUND GUIDED PROSTATE BIOPSY Bilateral 10/7/2014    [facility]    TURBT (TRANSURETHRAL RESECTION OF BLADDER TUMOR) N/A 3/13/2019    [facility] at [facility] OR       Family History   Problem Relation Age of Onset    Hypertension Mother     Stroke Mother 94    COPD Father     Cancer Father         prostate    Hypertension Sister     Diabetes Sister     Hyperlipidemia Sister     Cancer Maternal Grandmother     Cancer Paternal Grandfather         prostate       Social History     Socioeconomic History    Marital status:      Spouse name: Not on file    Number of children: Not on file    Years of education: Not on file    Highest education level: Not on file   Occupational History    Not on file   Social Needs    Financial resource strain: Not on file    Food insecurity:     Worry: Not on file     Inability: Not on file    Transportation needs:     Medical: Not on file     Non-medical: Not on file   Tobacco Use    Smoking status: Former Smoker     Last attempt to quit: 1976     Years since quittin.4    Smokeless tobacco: Never Used   Substance and Sexual Activity    Alcohol use: Yes     Alcohol/week: 1.8 oz     Types: 3 Glasses of wine per week    Drug use: No    Sexual activity: Yes     Partners: Female   Lifestyle    Physical activity:     Days per week: Not on file     Minutes per session: Not on file    Stress: Not on file   Relationships    Social connections:     Talks on phone: Not on file     Gets together: Not on file     Attends Zoroastrian service: Not on file     Active member of club or organization: Not on file     Attends meetings of clubs or organizations: Not on file     Relationship  "status: Not on file   Other Topics Concern    Not on file   Social History Narrative    Not on file       Review of patient's allergies indicates:   Allergen Reactions    Penicillins Rash     Other reaction(s): Rash       Medications Reviewed: see MAR    ROS:    Constitutional: denies fevers, chills, night sweats, fatigue, malaise  Respiratory: negative for cough, shortness of breath, wheezing, dyspnea.  Cardiovascular: + for high blood pressure, negative for chest pain, varicose veins, ankle swelling, palpitations, syncope.  GI: negative for abdominal pain, heartburn, indigestion, nausea, vomiting, constipation, diarrhea, blood in stool.   Urology: as noted above in HPI  Endocrinology: negative for cold intolerance, excessive thirst, not feeling tired/sluggish, no heat intolerance.   Hematology/Lymph: negative for easy bleeding, easy bruising, swollen glands.  Musculoskeletal: negative for back pain, joint pain, joint swelling, neck pain.  Allergy-Immunology: negative for seasonal allergies, negative for unusual infections.   Skin: negative for boils, breast lumps, hives, itching, rash.   Neurology: negative for, dizziness, headache, tingling/numbness, tremors.   Psych: satisfied with life; negative for, anxiety, depression, suicidal thoughts.     PHYSICAL EXAM:    Vitals:    06/14/19 1036   BP: 136/78   Pulse: 61   Resp: 18     Body mass index is 26.17 kg/m². Weight: 78.7 kg (173 lb 6.3 oz) Height: 5' 8.25" (173.4 cm)       General: Alert, cooperative, no distress, appears stated age  Head: Normocephalic, without obvious abnormality, atraumatic  Neck: no masses, no thyromegaly, no lymphadenopathy  Eyes: PERRL, conjunctiva/corneas clear  Lungs: Respirations unlabored, normal effort, no accessory muscle use  CV: Warm and well perfused extremities  Abdomen: Soft, non-tender, no CVA tenderness, no hepatosplenomegaly, no hernia  Extremities: Extremities normal, atraumatic, no cyanosis or edema  Skin: Normal color, " texture, and turgor, no rashes or lesions  Psych: Appropriate, well oriented, normal affect, normal mood  Neuro: Non-focal    Lab Results   Component Value Date    PSA 3.63 12/11/2012    PSA 3.15 08/08/2012    PSA 3.03 07/13/2012       LABS:    Recent Results (from the past 336 hour(s))   POCT URINE DIPSTICK WITHOUT MICROSCOPE    Collection Time: 06/14/19 10:46 AM   Result Value Ref Range    Color, UA yellow     Spec Grav UA 1.025     pH, UA 5     WBC, UA trace     Nitrite, UA neg     Protein neg     Glucose, UA neg     Ketones, UA neg     Urobilinogen, UA 0.2     Bilirubin neg     Blood, UA neg          Assessment/Diagnosis:    1. Malignant neoplasm of lateral wall of bladder  Cytology, urine    Case Request Operating Room: CYSTOSCOPY    Place in Outpatient   2. History of prostate cancer  POCT URINE DIPSTICK WITHOUT MICROSCOPE    Prostate Specific Antigen, Diagnostic   3. History of urinary retention  POCT URINE DIPSTICK WITHOUT MICROSCOPE       Plans:  In reviewing his history of prostate cancer treated with radiotherapy, his PSA is at the low as point has been.  It did seem to wandy around 0.75 with slight fluctuations and most recently at 0.70 and 0.49 earlier this year.  No concern for biochemical recurrence of prostate cancer at this time. Will recheck prior to cysto.    He has however been noted to have trilobar prostatic obstruction with median lobe in the past and bladder trabeculations.  He did recently experienced urinary retention after catheterization and chemotherapy installation, and has been voiding well with Flomax ever since, but also notes was voiding well prior without flomax.  He would like to discontinue Flomax as he is only taken it once in the past after procedure, for only 1 month, and was voiding fine since then and prior to his mitomycin instillation.  Given his history of prostatic enlargement with recent urinary retention, I did advise that he remain on Flomax 0.4 mg daily until  upcoming cystoscopic evaluation.  We did discuss that he will refill the medication but keep it in his medicine cabinet and discontinue it at this time but if he has any voiding difficulty in the interim off of medication he will have it available to restart.  Will re-evaluate for prostatic obstruction at time of upcoming cystoscopy that.    In regards to his bladder cancer, we did discuss the increased risk of secondary malignancy with radiotherapy for his prostate cancer.  On review of the operative note it does seem that the tumor was incompletely resected, and largely fulgurated.  Even if completely destroyed/ablated, he is due for surveillance cystoscopy.  We did discuss the utility of mitomycin in the immediate postoperative period, wiithin 24 hr, preferably 6, after complete resection of tumor to decrease recurrence risk.  We did also discuss the indications of BCG treatment.  As he had a low-grade noninvasive tumor, by pathology can continue cystoscopic surveillance.  Perhaps the intravesical treatment was recommended due to the incomplete resection and rather fulguration, though office notes indicate preoperative plan was for resection and BCG, and mitomycin was used given BCG National back order.  We did discuss the natural history of any bladder cancer requires cystoscopic surveillance, initially at 3 months postop.  With low-grade noninvasive bladder cancer if initial surveillance cystoscopy at 3 months is negative, can perform cystoscopy again at 6 months.  For higher grade findings every 3 months.  If any tumors found, can be formally resected, and the surveillance timeline resets.     Procedure in detail of diagnostic flexible cystourethroscopy with local anesthesia, intraurethral xylocaine jelly instillation, was reviewed with the patient.  Will also evaluate for prostatic obstruction at this time.  Prostatic obstruction is likely the reason for his urinary retention, though may be related to bladder  affects from 1 dose of mitomycin, less likely.  Should he be found to have a recurrence of bladder tumor, can discuss risks/benefits of immediate postop mitomycin C instillation, though I understand his reticence to have any other intravesical therapies.  At this time, they are not indicated.  He can discuss further if findings indicate.    30 mins spent in encounter, over half in counseling. Prior to encounter 75 mins spent in encounter in extensive review of prior medical and urologic record as summarized above.  Also extensive time in discussion about care plan and responding to patient's urinary retention and making office care plan and reviewing nursing visits.  Also as summarized above.    Cysto at [facility] 7/9/19  Discuss the possibility of bladder biopsy at the same time if there is only a small area of concern and no distinct tumor.  He does not take blood thinners and has been advised not to take any aspirin, fish oil, vitamin-E etc 1 week prior.  If any concerning findings beyond the capability of a small biopsy forceps, would plan formal operative management at that time.

## 2019-06-12 NOTE — H&P (VIEW-ONLY)
[facility] Urology New Patient/H&P:    Edwin Sepulveda III is a 64 y.o. male with history of prostate cancer and bladder cancer, and recent urinary retention after MMC instillation, transitioning care from Dr Santos    History of right orchiectomy for spermatocele. Grandfather with history of prostate cancer. Followed for annual psa screening and BPH since prior to 2012  PSA kym 1.9 to 3.03 2011 to 2012 and felt to have mild prostatitis in July 2012 for which he was treated with a course of Cipro.  Repeat PSA 5/29/13 was 3.11, and did then rise to 4.7 on 8/15/14.  Again, prostatitis was suspected and he was given Bactrim for 3 weeks.  Repeat PSA increased to 6.7 with 17% free in September 2014 after treatment with antibiotics  10/7/14:  Cystoscopy and prostate biopsy:  23.8 g prostate, trilobar obstruction with moderate median lobe and 3.5 cm prostatic urethral length, grade 2-3 trabeculations, no bladder lesions.  Pathology demonstrated high-volume Gildardo 6 prostate cancer in 7/12 cores.  Fiducial markers placed 11/19/14 and he underwent external beam radiation with Dr. Garcia.  5/7/5: ER with renal colic. KUB shows the patient has a 6-mm stone in the left kidney and 3 mm in the left upper pole and a very small stone on the right side. 10-15 years prior he had a stone  5/26/15: significant discomfort after ejaculation attributed to radiation inflammation in the prostate and bladder and reassured, psa 1.6 3/27/15, elected observation for nonobst stones, psa 0.88 8/7/15, 0.75 8/18/16    Continued semiannual psa screening after and had no urinary complaints, until returning in Jan 2019 with 3d GH. Urine was nit+ in ER at time of visit for GH and was given abx but obscured bc red as micro only with >100rbc and no wbc/bacteria so did not reflex to culture. Cytology negative  Cysto 2/13/19: 3 cm prostatic urethra with open bladder neck s/p radiation, g2 trabeculations, typical TCC lesions in the left bladder wall 4 cm  aggregated   CTU 2/13/19: Kidneys are normal in size and attenuation.  There are bilateral nonobstructing renal calculi ranging in size from 1 mm to 8 mm.  Kidneys enhance symmetrically.  No changes of hydronephrosis.  No perinephric inflammatory change.  Delayed images demonstrate the ureters to be normal in course and caliber. Air and stool throughout the loops of colon.  Scattered diverticula within the colon.  No mesenteric inflammatory change.  No CT evidence for bowel obstruction. The bladder is distended.  Prostate is mildly enlarged measuring 3.6 cm AP x 3.8 cm transverse.  Seminal vesicles and rectum unremarkable.  Surgical clips within the pelvis. No significant mesenteric or retroperitoneal lymphadenopathy. There is mild bone demineralization.  Bilateral spondylolysis at L5-S1 with a grade 1 anterolisthesis.  2/25/19 discussed proceeding with TURBT and induction course of BCG  3/13/19: TURBT Op report:  The prostatic urethra shows changes from the previous external beam radiation of the prostate for prostate cancer.  Inspection of the bladder shows bladder lesions in the left side of the bladder closer to  the bladder neck toward the left above the left ureteral orifice.  These are multiple lesions and an aggregate of approximately 4 cm.  Since the location of these lesions is just on top of the obturator nerve, I did only  1 loop of resection for pathological diagnosis and the remaining of the tumor was all fulgurated.  Only one time tissues were resected with the loop.  The tissue was sent to the lab for pathological diagnosis.  Then,  the roller ball type of electrode was placed and the area was completely fulgurated.  A good satisfactory destruction of the tumor was achieved. 22fr coude gr placed  PATH:   Low-grade papillary urothelial carcinoma. No evidence of lamina propria invasion identified. Muscularis propria is present and uninvolved tumor.  Gr removed POD 2, 3/15/19. Experienced urinary  retention afterwards. Then returned again for gr removal 3/20/19  Due for Csope (last 3/08)    Started MMC instillation on 5/13/19 at [facility]. Cathd with 14fr gr, ordered chemo instilled, observed for 20 mins, advised to hold for 2 hours and flush toilet x2.  He experienced urinary retention afterwards and went to [facility] and had gr placed (noted difficulty placing). Given 10d course of cipro  Called to transition care to [place], so I started him on flomax, cancelled further MMC instillations, and had him come in on 5/20/19 for fill and pull voiding trial. Filled to 180cc as felt at capacity, and voided 200cc clear urine.   Ref. Range 8/18/2016 08:14 2/20/2017 08:34 8/21/2017 09:05 3/8/2018 09:08 1/29/2019 11:22   PSA DIAGNOSTIC Latest Ref Range: 0.00 - 4.00 ng/mL 0.75 1.4 0.94 0.70 0.49     He returns today noting:  Had colonoscopy last month with Dr Borja with some evidence of radiation proctitis, and a small benign polyp which was removed.  No urine came out when catheter passed. And was nurses first time catheterizing  Patient felt like catheter was not all the way in and perhaps medicine instilled into prostate and caused inflammation leading to retention  At this time, voiding well, no blood in urine.   AUA SS 2/1, pleased  Ucx at time of ED  Visit negative.       Past Medical History:   Diagnosis Date    Family history of malignant neoplasm of prostate 8/14/2011    Gastroesophageal reflux disease with hiatal hernia     History of prostate cancer 1/7/2016    Hyperlipidemia     Kidney stones     Multinodular goiter     no medications    Prostate cancer 10/20/2014    Prostate disorder     Dr Santos        Past Surgical History:   Procedure Laterality Date    APPENDECTOMY      COLONOSCOPY  3/26/2008    5-10 year recheck per Dr. Borja (normal exam)    CYSTOSCOPY N/A 10/7/2014    [facility]    CYSTOSCOPY, FLEXIBLE N/A 2/13/2019    Performed  by [facility] at [facility] OR    esphogus scope      FULGURATION, BLADDER Left 3/13/2019    [facility] at [facility] OR    leg repair Left     ORCHIECTOMY      spermatocele repair      TRANSRECTAL ULTRASOUND GUIDED PROSTATE BIOPSY Bilateral 10/7/2014    [facility]    TURBT (TRANSURETHRAL RESECTION OF BLADDER TUMOR) N/A 3/13/2019    [facility] by [facility] at [facility] OR       Family History   Problem Relation Age of Onset    Hypertension Mother     Stroke Mother 94    COPD Father     Cancer Father         prostate    Hypertension Sister     Diabetes Sister     Hyperlipidemia Sister     Cancer Maternal Grandmother     Cancer Paternal Grandfather         prostate       Social History     Socioeconomic History    Marital status:      Spouse name: Not on file    Number of children: Not on file    Years of education: Not on file    Highest education level: Not on file   Occupational History    Not on file   Social Needs    Financial resource strain: Not on file    Food insecurity:     Worry: Not on file     Inability: Not on file    Transportation needs:     Medical: Not on file     Non-medical: Not on file   Tobacco Use    Smoking status: Former Smoker     Last attempt to quit: 1976     Years since quittin.4    Smokeless tobacco: Never Used   Substance and Sexual Activity    Alcohol use: Yes     Alcohol/week: 1.8 oz     Types: 3 Glasses of wine per week    Drug use: No    Sexual activity: Yes     Partners: Female   Lifestyle    Physical activity:     Days per week: Not on file     Minutes per session: Not on file    Stress: Not on file   Relationships    Social connections:     Talks on phone: Not on file     Gets together: Not on file     Attends Tenriism service: Not on file     Active member of club or organization: Not on file     Attends meetings of clubs or organizations: Not on file     Relationship  "status: Not on file   Other Topics Concern    Not on file   Social History Narrative    Not on file       Review of patient's allergies indicates:   Allergen Reactions    Penicillins Rash     Other reaction(s): Rash       Medications Reviewed: see MAR    ROS:    Constitutional: denies fevers, chills, night sweats, fatigue, malaise  Respiratory: negative for cough, shortness of breath, wheezing, dyspnea.  Cardiovascular: + for high blood pressure, negative for chest pain, varicose veins, ankle swelling, palpitations, syncope.  GI: negative for abdominal pain, heartburn, indigestion, nausea, vomiting, constipation, diarrhea, blood in stool.   Urology: as noted above in HPI  Endocrinology: negative for cold intolerance, excessive thirst, not feeling tired/sluggish, no heat intolerance.   Hematology/Lymph: negative for easy bleeding, easy bruising, swollen glands.  Musculoskeletal: negative for back pain, joint pain, joint swelling, neck pain.  Allergy-Immunology: negative for seasonal allergies, negative for unusual infections.   Skin: negative for boils, breast lumps, hives, itching, rash.   Neurology: negative for, dizziness, headache, tingling/numbness, tremors.   Psych: satisfied with life; negative for, anxiety, depression, suicidal thoughts.     PHYSICAL EXAM:    Vitals:    06/14/19 1036   BP: 136/78   Pulse: 61   Resp: 18     Body mass index is 26.17 kg/m². Weight: 78.7 kg (173 lb 6.3 oz) Height: 5' 8.25" (173.4 cm)       General: Alert, cooperative, no distress, appears stated age  Head: Normocephalic, without obvious abnormality, atraumatic  Neck: no masses, no thyromegaly, no lymphadenopathy  Eyes: PERRL, conjunctiva/corneas clear  Lungs: Respirations unlabored, normal effort, no accessory muscle use  CV: Warm and well perfused extremities  Abdomen: Soft, non-tender, no CVA tenderness, no hepatosplenomegaly, no hernia  Extremities: Extremities normal, atraumatic, no cyanosis or edema  Skin: Normal color, " texture, and turgor, no rashes or lesions  Psych: Appropriate, well oriented, normal affect, normal mood  Neuro: Non-focal    Lab Results   Component Value Date    PSA 3.63 12/11/2012    PSA 3.15 08/08/2012    PSA 3.03 07/13/2012       LABS:    Recent Results (from the past 336 hour(s))   POCT URINE DIPSTICK WITHOUT MICROSCOPE    Collection Time: 06/14/19 10:46 AM   Result Value Ref Range    Color, UA yellow     Spec Grav UA 1.025     pH, UA 5     WBC, UA trace     Nitrite, UA neg     Protein neg     Glucose, UA neg     Ketones, UA neg     Urobilinogen, UA 0.2     Bilirubin neg     Blood, UA neg          Assessment/Diagnosis:    1. Malignant neoplasm of lateral wall of bladder  Cytology, urine    Case Request Operating Room: CYSTOSCOPY    Place in Outpatient   2. History of prostate cancer  POCT URINE DIPSTICK WITHOUT MICROSCOPE    Prostate Specific Antigen, Diagnostic   3. History of urinary retention  POCT URINE DIPSTICK WITHOUT MICROSCOPE       Plans:  In reviewing his history of prostate cancer treated with radiotherapy, his PSA is at the low as point has been.  It did seem to wandy around 0.75 with slight fluctuations and most recently at 0.70 and 0.49 earlier this year.  No concern for biochemical recurrence of prostate cancer at this time. Will recheck prior to cysto.    He has however been noted to have trilobar prostatic obstruction with median lobe in the past and bladder trabeculations.  He did recently experienced urinary retention after catheterization and chemotherapy installation, and has been voiding well with Flomax ever since, but also notes was voiding well prior without flomax.  He would like to discontinue Flomax as he is only taken it once in the past after procedure, for only 1 month, and was voiding fine since then and prior to his mitomycin instillation.  Given his history of prostatic enlargement with recent urinary retention, I did advise that he remain on Flomax 0.4 mg daily until  upcoming cystoscopic evaluation.  We did discuss that he will refill the medication but keep it in his medicine cabinet and discontinue it at this time but if he has any voiding difficulty in the interim off of medication he will have it available to restart.  Will re-evaluate for prostatic obstruction at time of upcoming cystoscopy that.    In regards to his bladder cancer, we did discuss the increased risk of secondary malignancy with radiotherapy for his prostate cancer.  On review of the operative note it does seem that the tumor was incompletely resected, and largely fulgurated.  Even if completely destroyed/ablated, he is due for surveillance cystoscopy.  We did discuss the utility of mitomycin in the immediate postoperative period, wiithin 24 hr, preferably 6, after complete resection of tumor to decrease recurrence risk.  We did also discuss the indications of BCG treatment.  As he had a low-grade noninvasive tumor, by pathology can continue cystoscopic surveillance.  Perhaps the intravesical treatment was recommended due to the incomplete resection and rather fulguration, though office notes indicate preoperative plan was for resection and BCG, and mitomycin was used given BCG National back order.  We did discuss the natural history of any bladder cancer requires cystoscopic surveillance, initially at 3 months postop.  With low-grade noninvasive bladder cancer if initial surveillance cystoscopy at 3 months is negative, can perform cystoscopy again at 6 months.  For higher grade findings every 3 months.  If any tumors found, can be formally resected, and the surveillance timeline resets.     Procedure in detail of diagnostic flexible cystourethroscopy with local anesthesia, intraurethral xylocaine jelly instillation, was reviewed with the patient.  Will also evaluate for prostatic obstruction at this time.  Prostatic obstruction is likely the reason for his urinary retention, though may be related to bladder  affects from 1 dose of mitomycin, less likely.  Should he be found to have a recurrence of bladder tumor, can discuss risks/benefits of immediate postop mitomycin C instillation, though I understand his reticence to have any other intravesical therapies.  At this time, they are not indicated.  He can discuss further if findings indicate.    30 mins spent in encounter, over half in counseling. Prior to encounter 75 mins spent in encounter in extensive review of prior medical and urologic record as summarized above.  Also extensive time in discussion about care plan and responding to patient's urinary retention and making office care plan and reviewing nursing visits.  Also as summarized above.    Cysto at [facility] 7/9/19  Discuss the possibility of bladder biopsy at the same time if there is only a small area of concern and no distinct tumor.  He does not take blood thinners and has been advised not to take any aspirin, fish oil, vitamin-E etc 1 week prior.  If any concerning findings beyond the capability of a small biopsy forceps, would plan formal operative management at that time.

## 2019-06-14 ENCOUNTER — APPOINTMENT (OUTPATIENT)
Dept: LAB | Facility: HOSPITAL | Age: 64
End: 2019-06-14
Attending: UROLOGY
Payer: MEDICARE

## 2019-06-14 ENCOUNTER — OFFICE VISIT (OUTPATIENT)
Dept: UROLOGY | Facility: CLINIC | Age: 64
End: 2019-06-14
Payer: MEDICARE

## 2019-06-14 ENCOUNTER — PATIENT MESSAGE (OUTPATIENT)
Dept: SURGERY | Facility: AMBULARY SURGERY CENTER | Age: 64
End: 2019-06-14

## 2019-06-14 VITALS
RESPIRATION RATE: 18 BRPM | SYSTOLIC BLOOD PRESSURE: 136 MMHG | HEART RATE: 61 BPM | DIASTOLIC BLOOD PRESSURE: 78 MMHG | HEIGHT: 68 IN | WEIGHT: 173.38 LBS | BODY MASS INDEX: 26.28 KG/M2

## 2019-06-14 DIAGNOSIS — Z85.46 HISTORY OF PROSTATE CANCER: ICD-10-CM

## 2019-06-14 DIAGNOSIS — Z87.898 HISTORY OF URINARY RETENTION: ICD-10-CM

## 2019-06-14 DIAGNOSIS — C67.2 MALIGNANT NEOPLASM OF LATERAL WALL OF BLADDER: Primary | ICD-10-CM

## 2019-06-14 LAB
BILIRUB SERPL-MCNC: ABNORMAL MG/DL
BLOOD URINE, POC: ABNORMAL
COLOR, POC UA: YELLOW
GLUCOSE UR QL STRIP: ABNORMAL
KETONES UR QL STRIP: ABNORMAL
LEUKOCYTE ESTERASE URINE, POC: ABNORMAL
NITRITE, POC UA: ABNORMAL
PH, POC UA: 5
PROTEIN, POC: ABNORMAL
SPECIFIC GRAVITY, POC UA: 1.02
UROBILINOGEN, POC UA: 0.2

## 2019-06-14 PROCEDURE — 88112 CYTOLOGY SPECIMEN-URINE: ICD-10-PCS | Mod: 26,,, | Performed by: PATHOLOGY

## 2019-06-14 PROCEDURE — 88112 CYTOPATH CELL ENHANCE TECH: CPT | Performed by: PATHOLOGY

## 2019-06-14 PROCEDURE — 1101F PR PT FALLS ASSESS DOC 0-1 FALLS W/OUT INJ PAST YR: ICD-10-PCS | Mod: CPTII,S$GLB,, | Performed by: UROLOGY

## 2019-06-14 PROCEDURE — 88112 CYTOPATH CELL ENHANCE TECH: CPT | Mod: 26,,, | Performed by: PATHOLOGY

## 2019-06-14 PROCEDURE — 99214 PR OFFICE/OUTPT VISIT, EST, LEVL IV, 30-39 MIN: ICD-10-PCS | Mod: 25,S$GLB,, | Performed by: UROLOGY

## 2019-06-14 PROCEDURE — 99358 PR PROLONGED SERV,NO CONTACT,1ST HR: ICD-10-PCS | Mod: S$GLB,,, | Performed by: UROLOGY

## 2019-06-14 PROCEDURE — 99358 PROLONG SERVICE W/O CONTACT: CPT | Mod: S$GLB,,, | Performed by: UROLOGY

## 2019-06-14 PROCEDURE — 1101F PT FALLS ASSESS-DOCD LE1/YR: CPT | Mod: CPTII,S$GLB,, | Performed by: UROLOGY

## 2019-06-14 PROCEDURE — 3075F SYST BP GE 130 - 139MM HG: CPT | Mod: CPTII,S$GLB,, | Performed by: UROLOGY

## 2019-06-14 PROCEDURE — 81002 URINALYSIS NONAUTO W/O SCOPE: CPT | Mod: S$GLB,,, | Performed by: UROLOGY

## 2019-06-14 PROCEDURE — 3078F DIAST BP <80 MM HG: CPT | Mod: CPTII,S$GLB,, | Performed by: UROLOGY

## 2019-06-14 PROCEDURE — 81002 POCT URINE DIPSTICK WITHOUT MICROSCOPE: ICD-10-PCS | Mod: S$GLB,,, | Performed by: UROLOGY

## 2019-06-14 PROCEDURE — 3008F PR BODY MASS INDEX (BMI) DOCUMENTED: ICD-10-PCS | Mod: CPTII,S$GLB,, | Performed by: UROLOGY

## 2019-06-14 PROCEDURE — 3008F BODY MASS INDEX DOCD: CPT | Mod: CPTII,S$GLB,, | Performed by: UROLOGY

## 2019-06-14 PROCEDURE — 3075F PR MOST RECENT SYSTOLIC BLOOD PRESS GE 130-139MM HG: ICD-10-PCS | Mod: CPTII,S$GLB,, | Performed by: UROLOGY

## 2019-06-14 PROCEDURE — 99359 PROLONG SERV W/O CONTACT ADD: CPT | Mod: S$GLB,,, | Performed by: UROLOGY

## 2019-06-14 PROCEDURE — 3078F PR MOST RECENT DIASTOLIC BLOOD PRESSURE < 80 MM HG: ICD-10-PCS | Mod: CPTII,S$GLB,, | Performed by: UROLOGY

## 2019-06-14 PROCEDURE — 99999 PR PBB SHADOW E&M-EST. PATIENT-LVL IV: CPT | Mod: PBBFAC,,, | Performed by: UROLOGY

## 2019-06-14 PROCEDURE — 99214 OFFICE O/P EST MOD 30 MIN: CPT | Mod: 25,S$GLB,, | Performed by: UROLOGY

## 2019-06-14 PROCEDURE — 99999 PR PBB SHADOW E&M-EST. PATIENT-LVL IV: ICD-10-PCS | Mod: PBBFAC,,, | Performed by: UROLOGY

## 2019-06-14 PROCEDURE — 99359 PR PROLONGED SERV,NO CONTCT,EA ADD 30 MIN: ICD-10-PCS | Mod: S$GLB,,, | Performed by: UROLOGY

## 2019-06-14 RX ORDER — LIDOCAINE HYDROCHLORIDE 20 MG/ML
JELLY TOPICAL ONCE
Status: CANCELLED | OUTPATIENT
Start: 2019-06-14 | End: 2019-06-14

## 2019-06-30 ENCOUNTER — PATIENT MESSAGE (OUTPATIENT)
Dept: SURGERY | Facility: AMBULARY SURGERY CENTER | Age: 64
End: 2019-06-30

## 2019-07-01 ENCOUNTER — TELEPHONE (OUTPATIENT)
Dept: UROLOGY | Facility: CLINIC | Age: 64
End: 2019-07-01

## 2019-07-01 ENCOUNTER — PATIENT MESSAGE (OUTPATIENT)
Dept: SURGERY | Facility: AMBULARY SURGERY CENTER | Age: 64
End: 2019-07-01

## 2019-07-01 NOTE — TELEPHONE ENCOUNTER
Patient having procedure on 7/9, and knows he will be put on antibiotics. He is doing lab work on Monday 7/8. Would like to have the anti\biotic already ordered and could go ahead and  on Monday after lab work.

## 2019-07-04 RX ORDER — CIPROFLOXACIN 500 MG/1
500 TABLET ORAL 2 TIMES DAILY
Qty: 6 TABLET | Refills: 0 | Status: SHIPPED | OUTPATIENT
Start: 2019-07-04 | End: 2019-07-18

## 2019-07-04 NOTE — TELEPHONE ENCOUNTER
Can advise pt that cipro 500mg bid x6 pills sent to pharmacy     Usually give 4 pills (=2 days) after, but since has it in advance, should take 1st dose Monday evening, and 2nd dose Tuesday morning. Will then have 4 tabs remaining post procedure

## 2019-07-05 NOTE — TELEPHONE ENCOUNTER
Spoke w pt informed of abx with instructions of how to take med  sent to pharm pt voiced ok and understanding.

## 2019-07-08 ENCOUNTER — LAB VISIT (OUTPATIENT)
Dept: LAB | Facility: HOSPITAL | Age: 64
End: 2019-07-08
Attending: UROLOGY
Payer: MEDICARE

## 2019-07-08 DIAGNOSIS — Z85.46 HISTORY OF PROSTATE CANCER: ICD-10-CM

## 2019-07-08 LAB — COMPLEXED PSA SERPL-MCNC: 0.41 NG/ML (ref 0–4)

## 2019-07-08 PROCEDURE — 84153 ASSAY OF PSA TOTAL: CPT

## 2019-07-08 PROCEDURE — 36415 COLL VENOUS BLD VENIPUNCTURE: CPT | Mod: PO

## 2019-07-09 ENCOUNTER — PATIENT MESSAGE (OUTPATIENT)
Dept: SURGERY | Facility: AMBULARY SURGERY CENTER | Age: 64
End: 2019-07-09

## 2019-07-09 ENCOUNTER — HOSPITAL ENCOUNTER (OUTPATIENT)
Facility: AMBULARY SURGERY CENTER | Age: 64
Discharge: HOME | End: 2019-07-09
Attending: UROLOGY | Admitting: UROLOGY
Payer: MEDICARE

## 2019-07-09 DIAGNOSIS — D49.4 BLADDER TUMOR: Primary | ICD-10-CM

## 2019-07-09 DIAGNOSIS — C67.2 MALIGNANT NEOPLASM OF LATERAL WALL OF BLADDER: ICD-10-CM

## 2019-07-09 DIAGNOSIS — R82.998 CELLS AND CASTS IN URINE: ICD-10-CM

## 2019-07-09 PROCEDURE — 52000 PR CYSTOURETHROSCOPY: ICD-10-PCS | Mod: ,,, | Performed by: UROLOGY

## 2019-07-09 PROCEDURE — 52000 CYSTOURETHROSCOPY: CPT | Performed by: UROLOGY

## 2019-07-09 PROCEDURE — 52000 CYSTOURETHROSCOPY: CPT | Mod: ,,, | Performed by: UROLOGY

## 2019-07-09 RX ORDER — WATER 1 ML/ML
IRRIGANT IRRIGATION
Status: DISCONTINUED | OUTPATIENT
Start: 2019-07-09 | End: 2019-07-09 | Stop reason: HOSPADM

## 2019-07-09 RX ORDER — LIDOCAINE HYDROCHLORIDE 20 MG/ML
JELLY TOPICAL
Status: DISCONTINUED | OUTPATIENT
Start: 2019-07-09 | End: 2019-07-09 | Stop reason: HOSPADM

## 2019-07-09 RX ORDER — MITOMYCIN 5 MG/10ML
40 INJECTION, POWDER, LYOPHILIZED, FOR SOLUTION INTRAVENOUS ONCE
Status: CANCELLED | OUTPATIENT
Start: 2019-07-25

## 2019-07-09 RX ORDER — LIDOCAINE HYDROCHLORIDE 20 MG/ML
JELLY TOPICAL
Status: DISCONTINUED
Start: 2019-07-09 | End: 2019-07-09 | Stop reason: HOSPADM

## 2019-07-09 RX ORDER — LIDOCAINE HYDROCHLORIDE 20 MG/ML
JELLY TOPICAL ONCE
Status: DISCONTINUED | OUTPATIENT
Start: 2019-07-09 | End: 2019-07-09 | Stop reason: HOSPADM

## 2019-07-09 RX ORDER — CIPROFLOXACIN 2 MG/ML
400 INJECTION, SOLUTION INTRAVENOUS
Status: CANCELLED | OUTPATIENT
Start: 2019-07-09

## 2019-07-09 NOTE — INTERVAL H&P NOTE
The patient has been examined and the H&P has been reviewed:    No changes to above. Pt is acceptable candidate for procedure at [facility]    Anesthesia/Surgery risks, benefits and alternative options discussed and understood by patient/family.          Active Hospital Problems    Diagnosis  POA    Malignant neoplasm of lateral wall of bladder [C67.2]  Yes      Resolved Hospital Problems   No resolved problems to display.

## 2019-07-09 NOTE — DISCHARGE INSTRUCTIONS
After the procedure    · Drink plenty of fluids.  · You may have burning or light bleeding when you urinate--this is normal.  · Medications may be prescribed to ease any discomfort or prevent infection. Take these as directed.  · Call your doctor if you have heavy bleeding or blood clots, burning that lasts more than a day, a fever over 100°F  (38° C), or trouble urinating.        Before leaving, please make sure you have all your personal belongings such as glasses, purses, wallets, keys, cell phones, jewelry, jackets etc

## 2019-07-10 VITALS
DIASTOLIC BLOOD PRESSURE: 86 MMHG | WEIGHT: 173.38 LBS | HEIGHT: 68 IN | HEART RATE: 81 BPM | SYSTOLIC BLOOD PRESSURE: 141 MMHG | OXYGEN SATURATION: 96 % | TEMPERATURE: 98 F | BODY MASS INDEX: 26.28 KG/M2 | RESPIRATION RATE: 18 BRPM

## 2019-07-10 NOTE — OP NOTE
[facility] Operative Report/Brief Discharge Summary     Date: 7/9/19     Staff Surgeon: Kermit Beltran MD     Pre-Op Diagnosis: history of bladder cancer, history of XRT for prostate cancer     Post-Op Diagnosis:   bladder tumor  urethral stricture  history of bladder cancer, history of XRT for prostate cancer     Procedure(s) Performed:   Cystoscopy, flexible     Specimen(s): none     Anesthesia: Local anesthesia, urojet     Findings:   Narrow bulbar urethral stricture just distal to the sphincter which was able to passively dilate with flexible cystoscope  3 cm bladder tumor/growth from left lateral bladder wall at site of previous known tumor appearing more granulomatous, Gray, and frondular in appearance rather than classic papillary appearance, with significant surrounding hypervascularity circumferentially     Estimated Blood Loss: none     Drains: none     Complications: none     Indications for procedure:  64-year-old man with history of external beam radiation for prostate cancer in 2014, history of kidney stones, and most recently diagnosed with a bladder tumor in January 2019 after presenting with gross hematuria.  Cystoscopy in February 2019 by Dr. Santos revealed patent urethra the radiation changes of the bladder neck and 4 cm aggregate of typical TCC lesions in the left bladder wall, and was therefore taken for TURBT on 3/13/19 of which only a portion was resected and the remainder was fulgurated, and pathology revealed low-grade papillary urothelial carcinoma, noninvasive.  He did experience brief urinary retention after De Leon catheter was removed on postop day 2, and then presented on 5/13/19 for catheterization and mitomycin-C instillation at [facility] during which there was difficult catheterization and subsequent urinary retention for which he ultimately passed a fill and pull voiding trial after using Flomax which he has since discontinued.  He presents today for cystoscopic  surveillance.     Procedure in detail:  After informed consent, the patient was prepped and drapped in standard cystoscopic fashion and 2% xylocaine jelly was instilled into the urethra. A flexible cystoscope was passed into the bladder via the urethra .    Anterior urethra appeared normal until point of narrow stricture just distal to the sphincter which had some resistance with scope passage, though cystoscope was ultimately able to pass through and passively dilate this area of narrowing and passed through the prostate into the bladder. Mild prostatic lateral lobe enlargement and ingrowth without gross obstruction.      The bladder was then systematically inspected. Bladder neck demonstrated good coaptation. In area of previous tumor management and resection, on the left lateral wall just posterior lateral to the ureteral orifice and trigone, there was an approximate 3 cm lesion as described above.  Appearance with similar to what could be seen for BCG granuloma, though he did not have BCG and was of recurrence directly at the site of previous resection with significant hypervascularity and we did discuss the need for resection.  Significant surrounding hypervascularity. Ureteral orifice unobstructed.     The rest of his bladder mucosa appeared grossly normal with the exception of mild trabeculations   Bilateral ureteral orifices seen in their orthotopic position on the trigone bilaterally.     Pictures of urethral and bladder abnormalities  captured and placed on patient's chart. He tolerated the procedure well with no complications.      Disposition:   Given his bladder findings, discussed need for re- transurethral resection of bladder tumor.  Risks and benefits of TURBT including but not limited to pain, infection, bleeding, stricture, injury to bladder or urethra, bladder perforation, recurrence, need for future treatment were discussed again with patient. We also discussed instillation of mitomycin C at  time of resection if amenable, and associated risks and benefits.     I did contrast the use of mitomycin in the immediate postoperative setting within 6 hr of resection to prevent recurrence from previous attempts at adjuvant use and traumatic catheterization. Will be using his indwelling De Leon catheter while being monitored in the postop recovery area and having the mitomycin drained and flush through completely before discharge, with De Leon catheter remaining in place for at least a few days postoperatively, most often 5.    We did discuss his concurrent urethral stricture as well and the likelihood of need for urethral dilation and or direct vision internal urethrotomy to pass the resectoscope.  The combination of previously irradiated tissue from external beam radiation for prostate cancer, in genitourinary tract manipulation with resectoscope of the likely etiologies of the urethral stricture, which was perhaps the reason for difficult catheterization in issues with retention after attempted outpatient catheterization instillation of mitomycin-C previously.  Brief discussion of all options to treat urethral stricture disease and its natural history, such as dilation, DVIU, urethroplasty as well as risks, benefits, recurrence rates.  Discuss the need for postoperative catheterization as well given urethral manipulation.    All questions the patient and his wife had were answered in detail.  We did have a risk benefit discussion about self calibration of urethral stricture after removal of catheter, though in the setting of a healing bladder from TURBT would defer at this time as we did discuss he would be under continued cystoscopic surveillance from the standpoint of his urothelial carcinoma at which time the urethra could be monitored closely, and if management as necessary in the future could address this at that time.     All questions answered and appropriate informed consent was obtained.  TURBT with  urethral dilation/DVIU in the operating room on 7/25/19.  - no formal clearances needed as had surgery in march with Dr Santos  - as last surgery in [place], arrange PAT next week for formal preop and update of labs and urine       Discharge home today status post uncomplicated procedure as above  Diet - resume home diet  Follow up: 7/25 in OR for TURBT  Instructions: drink plenty of water, may see blood in urine, take abx as directed  Meds     Medication List      CONTINUE taking these medications    ciprofloxacin HCl 500 MG tablet  Commonly known as:  CIPRO  Take 1 tablet (500 mg total) by mouth 2 (two) times daily.        STOP taking these medications    tamsulosin 0.4 mg Cap  Commonly known as:  FLOMAX

## 2019-07-17 ENCOUNTER — PATIENT MESSAGE (OUTPATIENT)
Dept: SURGERY | Facility: HOSPITAL | Age: 64
End: 2019-07-17

## 2019-07-17 NOTE — TELEPHONE ENCOUNTER
Patient requesting to get any needed post op medications sent to the pharmacy prior to his actual surgery date.

## 2019-07-18 ENCOUNTER — HOSPITAL ENCOUNTER (OUTPATIENT)
Dept: PREADMISSION TESTING | Facility: HOSPITAL | Age: 64
Discharge: HOME | End: 2019-07-18
Attending: UROLOGY
Payer: MEDICARE

## 2019-07-18 VITALS — HEIGHT: 69 IN | BODY MASS INDEX: 25.62 KG/M2 | WEIGHT: 173 LBS

## 2019-07-18 PROCEDURE — 99900103 DSU ONLY-NO CHARGE-INITIAL HR (STAT)

## 2019-07-18 PROCEDURE — 99900104 DSU ONLY-NO CHARGE-EA ADD'L HR (STAT)

## 2019-07-18 NOTE — DISCHARGE INSTRUCTIONS

## 2019-07-23 ENCOUNTER — PATIENT MESSAGE (OUTPATIENT)
Dept: SURGERY | Facility: HOSPITAL | Age: 64
End: 2019-07-23

## 2019-07-23 RX ORDER — LIDOCAINE HYDROCHLORIDE 20 MG/ML
JELLY TOPICAL
Qty: 5 ML | Refills: 0 | Status: SHIPPED | OUTPATIENT
Start: 2019-07-23 | End: 2019-08-05 | Stop reason: ALTCHOICE

## 2019-07-23 RX ORDER — CIPROFLOXACIN 500 MG/1
500 TABLET ORAL 2 TIMES DAILY
Qty: 10 TABLET | Refills: 0 | Status: SHIPPED | OUTPATIENT
Start: 2019-07-23 | End: 2019-08-05 | Stop reason: ALTCHOICE

## 2019-07-23 RX ORDER — TRAMADOL HYDROCHLORIDE 50 MG/1
50 TABLET ORAL EVERY 6 HOURS PRN
Qty: 10 TABLET | Refills: 0 | Status: SHIPPED | OUTPATIENT
Start: 2019-07-23 | End: 2019-08-05 | Stop reason: ALTCHOICE

## 2019-07-24 ENCOUNTER — ANESTHESIA EVENT (OUTPATIENT)
Dept: SURGERY | Facility: HOSPITAL | Age: 64
End: 2019-07-24
Payer: MEDICARE

## 2019-07-25 ENCOUNTER — ANESTHESIA (OUTPATIENT)
Dept: SURGERY | Facility: HOSPITAL | Age: 64
End: 2019-07-25
Payer: MEDICARE

## 2019-07-25 ENCOUNTER — HOSPITAL ENCOUNTER (OUTPATIENT)
Facility: HOSPITAL | Age: 64
Discharge: HOME | End: 2019-07-25
Attending: UROLOGY | Admitting: UROLOGY
Payer: MEDICARE

## 2019-07-25 DIAGNOSIS — D49.4 BLADDER TUMOR: ICD-10-CM

## 2019-07-25 PROCEDURE — 63600175 PHARM REV CODE 636 W HCPCS: Performed by: UROLOGY

## 2019-07-25 PROCEDURE — 71000015 HC POSTOP RECOV 1ST HR: Performed by: UROLOGY

## 2019-07-25 PROCEDURE — 52235 PR CYSTOURETHROSCOPY,FULGUR 2-5 CM LESN: ICD-10-PCS | Mod: ,,, | Performed by: UROLOGY

## 2019-07-25 PROCEDURE — D9220A PRA ANESTHESIA: ICD-10-PCS | Mod: ANES,,, | Performed by: ANESTHESIOLOGY

## 2019-07-25 PROCEDURE — 71000033 HC RECOVERY, INTIAL HOUR: Performed by: UROLOGY

## 2019-07-25 PROCEDURE — 63600175 PHARM REV CODE 636 W HCPCS: Performed by: ANESTHESIOLOGY

## 2019-07-25 PROCEDURE — 71000039 HC RECOVERY, EACH ADD'L HOUR: Performed by: UROLOGY

## 2019-07-25 PROCEDURE — D9220A PRA ANESTHESIA: ICD-10-PCS | Mod: CRNA,,, | Performed by: NURSE ANESTHETIST, CERTIFIED REGISTERED

## 2019-07-25 PROCEDURE — 88307 TISSUE SPECIMEN TO PATHOLOGY - SURGERY: ICD-10-PCS | Mod: 26,,, | Performed by: PATHOLOGY

## 2019-07-25 PROCEDURE — 52214 PR CYSTOURETHROSCOPY,FULGURATN: ICD-10-PCS | Mod: 59,,, | Performed by: UROLOGY

## 2019-07-25 PROCEDURE — 88307 TISSUE EXAM BY PATHOLOGIST: CPT | Performed by: PATHOLOGY

## 2019-07-25 PROCEDURE — 36000707: Performed by: UROLOGY

## 2019-07-25 PROCEDURE — 99900103 DSU ONLY-NO CHARGE-INITIAL HR (STAT): Performed by: UROLOGY

## 2019-07-25 PROCEDURE — D9220A PRA ANESTHESIA: Mod: CRNA,,, | Performed by: NURSE ANESTHETIST, CERTIFIED REGISTERED

## 2019-07-25 PROCEDURE — D9220A PRA ANESTHESIA: Mod: ANES,,, | Performed by: ANESTHESIOLOGY

## 2019-07-25 PROCEDURE — 52235 CYSTOSCOPY AND TREATMENT: CPT | Mod: ,,, | Performed by: UROLOGY

## 2019-07-25 PROCEDURE — 36000706: Performed by: UROLOGY

## 2019-07-25 PROCEDURE — 99900104 DSU ONLY-NO CHARGE-EA ADD'L HR (STAT): Performed by: UROLOGY

## 2019-07-25 PROCEDURE — 71000016 HC POSTOP RECOV ADDL HR: Performed by: UROLOGY

## 2019-07-25 PROCEDURE — 37000008 HC ANESTHESIA 1ST 15 MINUTES: Performed by: UROLOGY

## 2019-07-25 PROCEDURE — 52214 CYSTOSCOPY AND TREATMENT: CPT | Mod: 59,,, | Performed by: UROLOGY

## 2019-07-25 PROCEDURE — 37000009 HC ANESTHESIA EA ADD 15 MINS: Performed by: UROLOGY

## 2019-07-25 PROCEDURE — 25000003 PHARM REV CODE 250: Performed by: ANESTHESIOLOGY

## 2019-07-25 PROCEDURE — 27201423 OPTIME MED/SURG SUP & DEVICES STERILE SUPPLY: Performed by: UROLOGY

## 2019-07-25 PROCEDURE — 63600175 PHARM REV CODE 636 W HCPCS: Performed by: NURSE ANESTHETIST, CERTIFIED REGISTERED

## 2019-07-25 RX ORDER — PROPOFOL 10 MG/ML
VIAL (ML) INTRAVENOUS
Status: DISCONTINUED | OUTPATIENT
Start: 2019-07-25 | End: 2019-07-25

## 2019-07-25 RX ORDER — FENTANYL CITRATE 50 UG/ML
INJECTION, SOLUTION INTRAMUSCULAR; INTRAVENOUS
Status: DISCONTINUED | OUTPATIENT
Start: 2019-07-25 | End: 2019-07-25

## 2019-07-25 RX ORDER — LIDOCAINE HYDROCHLORIDE 10 MG/ML
1 INJECTION, SOLUTION EPIDURAL; INFILTRATION; INTRACAUDAL; PERINEURAL ONCE
Status: DISCONTINUED | OUTPATIENT
Start: 2019-07-25 | End: 2019-07-25 | Stop reason: HOSPADM

## 2019-07-25 RX ORDER — METOCLOPRAMIDE HYDROCHLORIDE 5 MG/ML
10 INJECTION INTRAMUSCULAR; INTRAVENOUS EVERY 10 MIN PRN
Status: DISCONTINUED | OUTPATIENT
Start: 2019-07-25 | End: 2019-07-25 | Stop reason: HOSPADM

## 2019-07-25 RX ORDER — FENTANYL CITRATE 50 UG/ML
25 INJECTION, SOLUTION INTRAMUSCULAR; INTRAVENOUS EVERY 5 MIN PRN
Status: COMPLETED | OUTPATIENT
Start: 2019-07-25 | End: 2019-07-25

## 2019-07-25 RX ORDER — SODIUM CHLORIDE, SODIUM LACTATE, POTASSIUM CHLORIDE, CALCIUM CHLORIDE 600; 310; 30; 20 MG/100ML; MG/100ML; MG/100ML; MG/100ML
INJECTION, SOLUTION INTRAVENOUS CONTINUOUS
Status: DISCONTINUED | OUTPATIENT
Start: 2019-07-25 | End: 2019-07-25 | Stop reason: HOSPADM

## 2019-07-25 RX ORDER — ONDANSETRON HYDROCHLORIDE 2 MG/ML
INJECTION, SOLUTION INTRAMUSCULAR; INTRAVENOUS
Status: DISCONTINUED | OUTPATIENT
Start: 2019-07-25 | End: 2019-07-25

## 2019-07-25 RX ORDER — CIPROFLOXACIN 2 MG/ML
400 INJECTION, SOLUTION INTRAVENOUS
Status: COMPLETED | OUTPATIENT
Start: 2019-07-25 | End: 2019-07-25

## 2019-07-25 RX ORDER — DEXAMETHASONE SODIUM PHOSPHATE 4 MG/ML
INJECTION, SOLUTION INTRA-ARTICULAR; INTRALESIONAL; INTRAMUSCULAR; INTRAVENOUS; SOFT TISSUE
Status: DISCONTINUED | OUTPATIENT
Start: 2019-07-25 | End: 2019-07-25

## 2019-07-25 RX ORDER — LIDOCAINE HCL/PF 100 MG/5ML
SYRINGE (ML) INTRAVENOUS
Status: DISCONTINUED | OUTPATIENT
Start: 2019-07-25 | End: 2019-07-25

## 2019-07-25 RX ORDER — MITOMYCIN 5 MG/10ML
40 INJECTION, POWDER, LYOPHILIZED, FOR SOLUTION INTRAVENOUS ONCE
Status: DISCONTINUED | OUTPATIENT
Start: 2019-07-25 | End: 2019-07-25 | Stop reason: HOSPADM

## 2019-07-25 RX ORDER — OXYCODONE HYDROCHLORIDE 5 MG/1
5 TABLET ORAL
Status: DISCONTINUED | OUTPATIENT
Start: 2019-07-25 | End: 2019-07-25 | Stop reason: HOSPADM

## 2019-07-25 RX ADMIN — FENTANYL CITRATE 25 MCG: 0.05 INJECTION, SOLUTION INTRAMUSCULAR; INTRAVENOUS at 10:07

## 2019-07-25 RX ADMIN — FENTANYL CITRATE 25 MCG: 50 INJECTION, SOLUTION INTRAMUSCULAR; INTRAVENOUS at 08:07

## 2019-07-25 RX ADMIN — SODIUM CHLORIDE, SODIUM LACTATE, POTASSIUM CHLORIDE, AND CALCIUM CHLORIDE: .6; .31; .03; .02 INJECTION, SOLUTION INTRAVENOUS at 06:07

## 2019-07-25 RX ADMIN — SODIUM CHLORIDE, SODIUM LACTATE, POTASSIUM CHLORIDE, AND CALCIUM CHLORIDE: .6; .31; .03; .02 INJECTION, SOLUTION INTRAVENOUS at 10:07

## 2019-07-25 RX ADMIN — OXYCODONE HYDROCHLORIDE 5 MG: 5 TABLET ORAL at 10:07

## 2019-07-25 RX ADMIN — DEXAMETHASONE SODIUM PHOSPHATE 4 MG: 4 INJECTION, SOLUTION INTRAMUSCULAR; INTRAVENOUS at 08:07

## 2019-07-25 RX ADMIN — PROPOFOL 150 MG: 10 INJECTION, EMULSION INTRAVENOUS at 08:07

## 2019-07-25 RX ADMIN — ONDANSETRON 4 MG: 2 INJECTION, SOLUTION INTRAMUSCULAR; INTRAVENOUS at 08:07

## 2019-07-25 RX ADMIN — CIPROFLOXACIN 400 MG: 2 INJECTION INTRAVENOUS at 08:07

## 2019-07-25 RX ADMIN — LIDOCAINE HYDROCHLORIDE 75 MG: 20 INJECTION, SOLUTION INTRAVENOUS at 08:07

## 2019-07-25 NOTE — H&P
[facility] Urology New Patient/H&P:     Edwin Sepulveda III is a 64 y.o. male with history of prostate cancer and bladder cancer, and recent urinary retention after MMC instillation, transitioning care from Dr Santos     History of right orchiectomy for spermatocele. Grandfather with history of prostate cancer. Followed for annual psa screening and BPH since prior to 2012  PSA kym 1.9 to 3.03 2011 to 2012 and felt to have mild prostatitis in July 2012 for which he was treated with a course of Cipro.  Repeat PSA 5/29/13 was 3.11, and did then rise to 4.7 on 8/15/14.  Again, prostatitis was suspected and he was given Bactrim for 3 weeks.  Repeat PSA increased to 6.7 with 17% free in September 2014 after treatment with antibiotics  10/7/14:  Cystoscopy and prostate biopsy:  23.8 g prostate, trilobar obstruction with moderate median lobe and 3.5 cm prostatic urethral length, grade 2-3 trabeculations, no bladder lesions.  Pathology demonstrated high-volume Gildardo 6 prostate cancer in 7/12 cores.  Fiducial markers placed 11/19/14 and he underwent external beam radiation with Dr. Garcia.  5/7/5: ER with renal colic. KUB shows the patient has a 6-mm stone in the left kidney and 3 mm in the left upper pole and a very small stone on the right side. 10-15 years prior he had a stone  5/26/15: significant discomfort after ejaculation attributed to radiation inflammation in the prostate and bladder and reassured, psa 1.6 3/27/15, elected observation for nonobst stones, psa 0.88 8/7/15, 0.75 8/18/16     Continued semiannual psa screening after and had no urinary complaints, until returning in Jan 2019 with 3d GH. Urine was nit+ in ER at time of visit for GH and was given abx but obscured bc red as micro only with >100rbc and no wbc/bacteria so did not reflex to culture. Cytology negative  Cysto 2/13/19: 3 cm prostatic urethra with open bladder neck s/p radiation, g2 trabeculations, typical TCC lesions in the left bladder wall 4 cm  aggregated   CTU 2/13/19: Kidneys are normal in size and attenuation.  There are bilateral nonobstructing renal calculi ranging in size from 1 mm to 8 mm.  Kidneys enhance symmetrically.  No changes of hydronephrosis.  No perinephric inflammatory change.  Delayed images demonstrate the ureters to be normal in course and caliber. Air and stool throughout the loops of colon.  Scattered diverticula within the colon.  No mesenteric inflammatory change.  No CT evidence for bowel obstruction. The bladder is distended.  Prostate is mildly enlarged measuring 3.6 cm AP x 3.8 cm transverse.  Seminal vesicles and rectum unremarkable.  Surgical clips within the pelvis. No significant mesenteric or retroperitoneal lymphadenopathy. There is mild bone demineralization.  Bilateral spondylolysis at L5-S1 with a grade 1 anterolisthesis.  2/25/19 discussed proceeding with TURBT and induction course of BCG  3/13/19: TURBT Op report:  The prostatic urethra shows changes from the previous external beam radiation of the prostate for prostate cancer.  Inspection of the bladder shows bladder lesions in the left side of the bladder closer to  the bladder neck toward the left above the left ureteral orifice.  These are multiple lesions and an aggregate of approximately 4 cm.  Since the location of these lesions is just on top of the obturator nerve, I did only  1 loop of resection for pathological diagnosis and the remaining of the tumor was all fulgurated.  Only one time tissues were resected with the loop.  The tissue was sent to the lab for pathological diagnosis.  Then,  the roller ball type of electrode was placed and the area was completely fulgurated.  A good satisfactory destruction of the tumor was achieved. 22fr coude gr placed  PATH:   Low-grade papillary urothelial carcinoma. No evidence of lamina propria invasion identified. Muscularis propria is present and uninvolved tumor.  Gr removed POD 2, 3/15/19. Experienced urinary  retention afterwards. Then returned again for gr removal 3/20/19  Due for Csope (last 3/08)     Started [facility] instillation on 5/13/19 at [facility]. Cathd with 14fr gr, ordered chemo instilled, observed for 20 mins, advised to hold for 2 hours and flush toilet x2.  He experienced urinary retention afterwards and went to [facility] and had gr placed (noted difficulty placing). Given 10d course of cipro  Called to transition care to [place], so I started him on flomax, cancelled further MMC instillations, and had him come in on 5/20/19 for fill and pull voiding trial. Filled to 180cc as felt at capacity, and voided 200cc clear urine.    Ref. Range 8/18/2016 08:14 2/20/2017 08:34 8/21/2017 09:05 3/8/2018 09:08 1/29/2019 11:22   PSA DIAGNOSTIC Latest Ref Range: 0.00 - 4.00 ng/mL 0.75 1.4 0.94 0.70 0.49      He returns today noting:  Had colonoscopy last month with Dr Borja with some evidence of radiation proctitis, and a small benign polyp which was removed.  No urine came out when catheter passed. And was nurses first time catheterizing  Patient felt like catheter was not all the way in and perhaps medicine instilled into prostate and caused inflammation leading to retention  At this time, voiding well, no blood in urine.   AUA SS 2/1, pleased  Ucx at time of ED  Visit negative.              Past Medical History:   Diagnosis Date    Family history of malignant neoplasm of prostate 8/14/2011    Gastroesophageal reflux disease with hiatal hernia      History of prostate cancer 1/7/2016    Hyperlipidemia      Kidney stones      Multinodular goiter       no medications    Prostate cancer 10/20/2014    Prostate disorder       Dr Santos          Past Surgical History:   Procedure Laterality Date    APPENDECTOMY        COLONOSCOPY   3/26/2008     5-10 year recheck per Dr. Borja (normal exam)    CYSTOSCOPY N/A 10/7/2014     [facility] at [facility]    CYSTOSCOPY, FLEXIBLE N/A  2019     [facility]    esphogus scope        FULGURATION, BLADDER Left 3/13/2019     Per[facility] by [facility] at [facility] OR    leg repair Left      ORCHIECTOMY        spermatocele repair        TRANSRECTAL ULTRASOUND GUIDED PROSTATE BIOPSY Bilateral 10/7/2014     Performed [facility] at [facility] OR    TURBT (TRANSURETHRAL RESECTION OF BLADDER TUMOR) N/A 3/13/2019     Performed by [facility] at [facility] OR               Family History   Problem Relation Age of Onset    Hypertension Mother      Stroke Mother 94    COPD Father      Cancer Father           prostate    Hypertension Sister      Diabetes Sister      Hyperlipidemia Sister      Cancer Maternal Grandmother      Cancer Paternal Grandfather           prostate         Social History               Socioeconomic History    Marital status:        Spouse name: Not on file    Number of children: Not on file    Years of education: Not on file    Highest education level: Not on file   Occupational History    Not on file   Social Needs    Financial resource strain: Not on file    Food insecurity:       Worry: Not on file       Inability: Not on file    Transportation needs:       Medical: Not on file       Non-medical: Not on file   Tobacco Use    Smoking status: Former Smoker       Last attempt to quit: 1976       Years since quittin.4    Smokeless tobacco: Never Used   Substance and Sexual Activity    Alcohol use: Yes       Alcohol/week: 1.8 oz       Types: 3 Glasses of wine per week    Drug use: No    Sexual activity: Yes       Partners: Female   Lifestyle    Physical activity:       Days per week: Not on file       Minutes per session: Not on file    Stress: Not on file   Relationships    Social connections:       Talks on phone: Not on file       Gets together: Not on file       Attends Orthodox service: Not on file       Active member of club or  "organization: Not on file       Attends meetings of clubs or organizations: Not on file       Relationship status: Not on file   Other Topics Concern    Not on file   Social History Narrative    Not on file                  Review of patient's allergies indicates:   Allergen Reactions    Penicillins Rash       Other reaction(s): Rash         Medications Reviewed: see MAR     ROS:     Constitutional: denies fevers, chills, night sweats, fatigue, malaise  Respiratory: negative for cough, shortness of breath, wheezing, dyspnea.  Cardiovascular: + for high blood pressure, negative for chest pain, varicose veins, ankle swelling, palpitations, syncope.  GI: negative for abdominal pain, heartburn, indigestion, nausea, vomiting, constipation, diarrhea, blood in stool.   Urology: as noted above in HPI  Endocrinology: negative for cold intolerance, excessive thirst, not feeling tired/sluggish, no heat intolerance.   Hematology/Lymph: negative for easy bleeding, easy bruising, swollen glands.  Musculoskeletal: negative for back pain, joint pain, joint swelling, neck pain.  Allergy-Immunology: negative for seasonal allergies, negative for unusual infections.   Skin: negative for boils, breast lumps, hives, itching, rash.   Neurology: negative for, dizziness, headache, tingling/numbness, tremors.   Psych: satisfied with life; negative for, anxiety, depression, suicidal thoughts.      PHYSICAL EXAM:         Vitals:     06/14/19 1036   BP: 136/78   Pulse: 61   Resp: 18      Body mass index is 26.17 kg/m². Weight: 78.7 kg (173 lb 6.3 oz) Height: 5' 8.25" (173.4 cm)         General: Alert, cooperative, no distress, appears stated age  Head: Normocephalic, without obvious abnormality, atraumatic  Neck: no masses, no thyromegaly, no lymphadenopathy  Eyes: PERRL, conjunctiva/corneas clear  Lungs: Respirations unlabored, normal effort, no accessory muscle use  CV: Warm and well perfused extremities  Abdomen: Soft, non-tender, no CVA " tenderness, no hepatosplenomegaly, no hernia  Extremities: Extremities normal, atraumatic, no cyanosis or edema  Skin: Normal color, texture, and turgor, no rashes or lesions  Psych: Appropriate, well oriented, normal affect, normal mood  Neuro: Non-focal           Lab Results   Component Value Date     PSA 3.63 12/11/2012     PSA 3.15 08/08/2012     PSA 3.03 07/13/2012         LABS:     Recent Results         Recent Results (from the past 336 hour(s))   POCT URINE DIPSTICK WITHOUT MICROSCOPE     Collection Time: 06/14/19 10:46 AM   Result Value Ref Range     Color, UA yellow       Spec Grav UA 1.025       pH, UA 5       WBC, UA trace       Nitrite, UA neg       Protein neg       Glucose, UA neg       Ketones, UA neg       Urobilinogen, UA 0.2       Bilirubin neg       Blood, UA neg                 Assessment/Diagnosis:     1. Malignant neoplasm of lateral wall of bladder  Cytology, urine     Case Request Operating Room: CYSTOSCOPY     Place in Outpatient   2. History of prostate cancer  POCT URINE DIPSTICK WITHOUT MICROSCOPE     Prostate Specific Antigen, Diagnostic   3. History of urinary retention  POCT URINE DIPSTICK WITHOUT MICROSCOPE         Plans:  In reviewing his history of prostate cancer treated with radiotherapy, his PSA is at the low as point has been.  It did seem to wandy around 0.75 with slight fluctuations and most recently at 0.70 and 0.49 earlier this year.  No concern for biochemical recurrence of prostate cancer at this time. Will recheck prior to cysto.     He has however been noted to have trilobar prostatic obstruction with median lobe in the past and bladder trabeculations.  He did recently experienced urinary retention after catheterization and chemotherapy installation, and has been voiding well with Flomax ever since, but also notes was voiding well prior without flomax.  He would like to discontinue Flomax as he is only taken it once in the past after procedure, for only 1 month, and was  voiding fine since then and prior to his mitomycin instillation.  Given his history of prostatic enlargement with recent urinary retention, I did advise that he remain on Flomax 0.4 mg daily until upcoming cystoscopic evaluation.  We did discuss that he will refill the medication but keep it in his medicine cabinet and discontinue it at this time but if he has any voiding difficulty in the interim off of medication he will have it available to restart.  Will re-evaluate for prostatic obstruction at time of upcoming cystoscopy that.     In regards to his bladder cancer, we did discuss the increased risk of secondary malignancy with radiotherapy for his prostate cancer.  On review of the operative note it does seem that the tumor was incompletely resected, and largely fulgurated.  Even if completely destroyed/ablated, he is due for surveillance cystoscopy.  We did discuss the utility of mitomycin in the immediate postoperative period, wiithin 24 hr, preferably 6, after complete resection of tumor to decrease recurrence risk.  We did also discuss the indications of BCG treatment.  As he had a low-grade noninvasive tumor, by pathology can continue cystoscopic surveillance.  Perhaps the intravesical treatment was recommended due to the incomplete resection and rather fulguration, though office notes indicate preoperative plan was for resection and BCG, and mitomycin was used given BCG National back order.  We did discuss the natural history of any bladder cancer requires cystoscopic surveillance, initially at 3 months postop.  With low-grade noninvasive bladder cancer if initial surveillance cystoscopy at 3 months is negative, can perform cystoscopy again at 6 months.  For higher grade findings every 3 months.  If any tumors found, can be formally resected, and the surveillance timeline resets.      Procedure in detail of diagnostic flexible cystourethroscopy with local anesthesia, intraurethral xylocaine jelly  instillation, was reviewed with the patient.  Will also evaluate for prostatic obstruction at this time.  Prostatic obstruction is likely the reason for his urinary retention, though may be related to bladder affects from 1 dose of mitomycin, less likely.  Should he be found to have a recurrence of bladder tumor, can discuss risks/benefits of immediate postop mitomycin C instillation, though I understand his reticence to have any other intravesical therapies.  At this time, they are not indicated.  He can discuss further if findings indicate.     30 mins spent in encounter, over half in counseling. Prior to encounter 75 mins spent in encounter in extensive review of prior medical and urologic record as summarized above.  Also extensive time in discussion about care plan and responding to patient's urinary retention and making office care plan and reviewing nursing visits.  Also as summarized above.     Cysto at [facility] 7/9/19  Discuss the possibility of bladder biopsy at the same time if there is only a small area of concern and no distinct tumor.  He does not take blood thinners and has been advised not to take any aspirin, fish oil, vitamin-E etc 1 week prior.  If any concerning findings beyond the capability of a small biopsy forceps, would plan formal operative management at that time        Findings:   Narrow bulbar urethral stricture just distal to the sphincter which was able to passively dilate with flexible cystoscope  3 cm bladder tumor/growth from left lateral bladder wall at site of previous known tumor appearing more granulomatous, Gray, and frondular in appearance rather than classic papillary appearance, with significant surrounding hypervascularity circumferentially    Disposition:   Given his bladder findings, discussed need for re- transurethral resection of bladder tumor.  Risks and benefits of TURBT including but not limited to pain, infection, bleeding, stricture, injury to bladder or urethra,  bladder perforation, recurrence, need for future treatment were discussed again with patient. We also discussed instillation of mitomycin C at time of resection if amenable, and associated risks and benefits.      I did contrast the use of mitomycin in the immediate postoperative setting within 6 hr of resection to prevent recurrence from previous attempts at adjuvant use and traumatic catheterization. Will be using his indwelling De Leon catheter while being monitored in the postop recovery area and having the mitomycin drained and flush through completely before discharge, with De Leon catheter remaining in place for at least a few days postoperatively, most often 5.     We did discuss his concurrent urethral stricture as well and the likelihood of need for urethral dilation and or direct vision internal urethrotomy to pass the resectoscope.  The combination of previously irradiated tissue from external beam radiation for prostate cancer, in genitourinary tract manipulation with resectoscope of the likely etiologies of the urethral stricture, which was perhaps the reason for difficult catheterization in issues with retention after attempted outpatient catheterization instillation of mitomycin-C previously.  Brief discussion of all options to treat urethral stricture disease and its natural history, such as dilation, DVIU, urethroplasty as well as risks, benefits, recurrence rates.  Discuss the need for postoperative catheterization as well given urethral manipulation.     All questions the patient and his wife had were answered in detail.  We did have a risk benefit discussion about self calibration of urethral stricture after removal of catheter, though in the setting of a healing bladder from TURBT would defer at this time as we did discuss he would be under continued cystoscopic surveillance from the standpoint of his urothelial carcinoma at which time the urethra could be monitored closely, and if management as  necessary in the future could address this at that time.      All questions answered and appropriate informed consent was obtained.  TURBT with urethral dilation/DVIU in the operating room on 7/25/19.  - no formal clearances needed as had surgery in march with Dr Santos  - as last surgery in [place], arrange PAT next week for formal preop and update of labs and urine

## 2019-07-25 NOTE — PLAN OF CARE
Dr Guzman released from pacu to phase 2 urine in gr clr one pink streak in tube no clots noted skin w+d 02 sat 95-99 on room air alert and oriented vs wnl poc discussed Nausea NO emesis princess 1000 cc water  encouraged to drink lots of water this week

## 2019-07-25 NOTE — DISCHARGE INSTRUCTIONS
"Follow up: 7/30/19 by 0900 for gr removal (if path back will discuss, if not will plan time following week)  Instructions: drink plenty of water, may see blood in urine, no aspirin fish oil vit E bloodthinners x3 days, avoid strenuous activity with gr in place, no pushing/straining for BMs (stool softeners if needed)        Discharge Instructions: After Your Surgery/Procedure  Youve just had surgery. During surgery you were given medicine called anesthesia to keep you relaxed and free of pain. After surgery you may have some pain or nausea. This is common. Here are some tips for feeling better and getting well after surgery.     Stay on schedule with your medication.   Going home  Your doctor or nurse will show you how to take care of yourself when you go home. He or she will also answer your questions. Have an adult family member or friend drive you home.      For your safety we recommend these precaution for the first 24 hours after your procedure:  · Do not drive or use heavy equipment.  · Do not make important decisions or sign legal papers.  · Do not drink alcohol.  · Have someone stay with you, if needed. He or she can watch for problems and help keep you safe.  · Your concentration, balance, coordination, and judgement may be impaired for many hours after anesthesia.  Use caution when ambulating or standing up.     · You may feel weak and "washed out" after anesthesia and surgery.      Subtle residual effects of general anesthesia or sedation with regional / local anesthesia can last more than 24 hours.  Rest for the remainder of the day or longer if your Doctor/Surgeon has advised you to do so.  Although you may feel normal within the first 24 hours, your reflexes and mental ability may be impaired without you realizing it.  You may feel dizzy, lightheaded or sleepy for 24 hours or longer.      Be sure to go to all follow-up visits with your doctor. And rest after your surgery for as long as your " doctor tells you to.  Coping with pain  If you have pain after surgery, pain medicine will help you feel better. Take it as told, before pain becomes severe. Also, ask your doctor or pharmacist about other ways to control pain. This might be with heat, ice, or relaxation. And follow any other instructions your surgeon or nurse gives you.  Tips for taking pain medicine  To get the best relief possible, remember these points:  · Pain medicines can upset your stomach. Taking them with a little food may help.  · Most pain relievers taken by mouth need at least 20 to 30 minutes to start to work.  · Taking medicine on a schedule can help you remember to take it. Try to time your medicine so that you can take it before starting an activity. This might be before you get dressed, go for a walk, or sit down for dinner.  · Constipation is a common side effect of pain medicines. Call your doctor before taking any medicines such as laxatives or stool softeners to help ease constipation. Also ask if you should skip any foods. Drinking lots of fluids and eating foods such as fruits and vegetables that are high in fiber can also help. Remember, do not take laxatives unless your surgeon has prescribed them.  · Drinking alcohol and taking pain medicine can cause dizziness and slow your breathing. It can even be deadly. Do not drink alcohol while taking pain medicine.  · Pain medicine can make you react more slowly to things. Do not drive or run machinery while taking pain medicine.  Your health care provider may tell you to take acetaminophen to help ease your pain. Ask him or her how much you are supposed to take each day. Acetaminophen or other pain relievers may interact with your prescription medicines or other over-the-counter (OTC) drugs. Some prescription medicines have acetaminophen and other ingredients. Using both prescription and OTC acetaminophen for pain can cause you to overdose. Read the labels on your OTC  medicines with care. This will help you to clearly know the list of ingredients, how much to take, and any warnings. It may also help you not take too much acetaminophen. If you have questions or do not understand the information, ask your pharmacist or health care provider to explain it to you before you take the OTC medicine.  Managing nausea  Some people have an upset stomach after surgery. This is often because of anesthesia, pain, or pain medicine, or the stress of surgery. These tips will help you handle nausea and eat healthy foods as you get better. If you were on a special food plan before surgery, ask your doctor if you should follow it while you get better. These tips may help:  · Do not push yourself to eat. Your body will tell you when to eat and how much.  · Start off with clear liquids and soup. They are easier to digest.  · Next try semi-solid foods, such as mashed potatoes, applesauce, and gelatin, as you feel ready.  · Slowly move to solid foods. Dont eat fatty, rich, or spicy foods at first.  · Do not force yourself to have 3 large meals a day. Instead eat smaller amounts more often.  · Take pain medicines with a small amount of solid food, such as crackers or toast, to avoid nausea.     Call your surgeon if  · You still have pain an hour after taking medicine. The medicine may not be strong enough.  · You feel too sleepy, dizzy, or groggy. The medicine may be too strong.  · You have side effects like nausea, vomiting, or skin changes, such as rash, itching, or hives.       If you have obstructive sleep apnea  You were given anesthesia medicine during surgery to keep you comfortable and free of pain. After surgery, you may have more apnea spells because of this medicine and other medicines you were given. The spells may last longer than usual.   At home:  · Keep using the continuous positive airway pressure (CPAP) device when you sleep. Unless your health care provider tells you not to, use it  when you sleep, day or night. CPAP is a common device used to treat obstructive sleep apnea.  · Talk with your provider before taking any pain medicine, muscle relaxants, or sedatives. Your provider will tell you about the possible dangers of taking these medicines.  © 4483-1314 [employer]. [address] [place] [id number]. All rights reserved. This information is not intended as a substitute for professional medical care. Always follow your healthcare professional's instructions.          General Information:    1.  Do not drink alcoholic beverages including beer for 24 hours or as long as you are on pain medication..  2.  Do not drive a motor vehicle, operate machinery or power tools, or signs legal papers for 24 hours or as long as you are on pain medication.   3.  You may experience light-headedness, dizziness, and sleepiness following surgery. Please do not stay alone. A responsible adult should be with you for this 24 hour period.  4.  Go home and rest.    5. Progress slowly to a normal diet unless instructed.  Otherwise, begin with liquids such as soft drinks, then soup and crackers working up to solid foods. Drink plenty of nonalcoholic fluids.  6.  Certain anesthetics and pain medications produce nausea and vomiting in certain       individuals. If nausea becomes a problem at home, call you doctor.    7. A nurse will be calling you sometime after surgery. Do not be alarmed. This is our way of finding out how you are doing.    8. Several times every hour while you are awake, take 2-3 deep breaths and cough. If you had stomach surgery hold a pillow or rolled towel firmly against your stomach before you cough. This will help with any pain the cough might cause.  9. Several times every hour while you are awake, pump and flex your feet 5-6 times and do foot circles. This will help prevent blood clots.    10.Call your doctor for severe pain, bleeding, fever, or signs or symptoms of  infection (pain, swelling, redness, foul odor, drainage).        Post op instructions for prevention of DVT  What is deep vein thrombosis?  Deep vein thrombosis (DVT) is the medical term for blood clots in the deep veins of the leg.  These blood clots can be dangerous.  A DVT can block a blood vessel and keep blood from getting where it needs to go.  Another problem is that the clot can travel to other parts of the body such as the lungs.  A clot that travels to the lungs is called a pulmonary embolus (PE) and can cause serious problems with breathing which can lead to death.  Am I at risk for DVT/PE?  If you are not very active, you are at risk of DVT.  Anyone confined to bed, sitting for long periods of time, recovering from surgery, etc. increases the risk of DVT.  Other risk factors are cancer diagnosis, certain medications, estrogen replacement in any form,older age, obesity, pregnancy, smoking, history of clotting disorders, and dehydration.  How will I know if I have a DVT?   Swelling in the lower leg   Pain   Warmth, redness, hardness or bulging of the vein  If you have any of these symptoms, call your doctors office right away.  Some people will not have any symptoms until the clot moves to the lungs.  What are the symptoms of a PE?   Panting, shortness of breath, or trouble breathing   Sharp, knife-like chest pain when you breathe   Coughing or coughing up blood   Rapid heartbeat  If you have any of these symptoms or get worse quickly, call 911 for emergency treatment.  How can I prevent a DVT?   Avoid long periods of inactivity and dont cross your legs--get up and walk around every hour or so.   Stay active--walking after surgery is highly encouraged.  This means you should get out of the house and walk in the neighborhood.  Going up and down stairs will not impair healing (unless advised against such activity by your doctor).     Drink plenty of noncaffeinated, nonalcoholic fluids each day to  prevent dehydration.   Wear special support stockings, if they have been advised by your doctor.   If you travel, stop at least once an hour and walk around.   Avoid smoking (assistance with stopping is available through your healthcare provider)  Always notify your doctor if you are not able to follow the post operative instructions that are given to you at the time of discharge.  It may be necessary to prescribe one of the medications available to prevent DVT.

## 2019-07-25 NOTE — ANESTHESIA POSTPROCEDURE EVALUATION
Anesthesia Post Evaluation    Patient: Edwin Sepulveda III    Procedure(s) Performed: Procedure(s) (LRB):  TURBT (TRANSURETHRAL RESECTION OF BLADDER TUMOR) (N/A)  DILATION, URETHRA (N/A)    Final Anesthesia Type: general  Patient location during evaluation: PACU  Patient participation: Yes- Able to Participate  Level of consciousness: awake and alert  Post-procedure vital signs: reviewed and stable  Pain management: adequate  Airway patency: patent  PONV status at discharge: No PONV  Anesthetic complications: no      Cardiovascular status: blood pressure returned to baseline  Respiratory status: unassisted  Hydration status: euvolemic  Follow-up not needed.          Vitals Value Taken Time   /91 7/25/2019 11:04 AM   Temp 36.5 °C (97.7 °F) 7/25/2019 11:04 AM   Pulse 70 7/25/2019 11:04 AM   Resp 16 7/25/2019 11:04 AM   SpO2 95 % 7/25/2019 11:04 AM         Event Time     Out of Recovery 11:03:00          Pain/Sanya Score: Pain Rating Prior to Med Admin: 5 (7/25/2019 10:30 AM)  Pain Rating Post Med Admin: 5 (7/25/2019 10:30 AM)  Sanya Score: 10 (7/25/2019 11:03 AM)

## 2019-07-25 NOTE — PLAN OF CARE
Discharge instructions given verbally and in writing to pt and spouse who voice understanding.  Taking po fluids without nausea.  Pain 2/10 and tolerable.  De Leon catheter draining light pink urine.  Connected to leg bag at this time, pt and wife comfortable with changing from standard bag to leg bag.  No further questions at this time

## 2019-07-25 NOTE — PLAN OF CARE
Patient ready for surgery, awaiting Anesthesia consent and H/P.  Wife at bedside.  Relaxed with NAD noted.

## 2019-07-25 NOTE — ANESTHESIA PREPROCEDURE EVALUATION
07/25/2019  Edwin Sepulveda III is a 64 y.o., male.    Pre-op Assessment    I have reviewed the Patient Summary Reports.     I have reviewed the Nursing Notes.   I have reviewed the Medications.     Review of Systems  Anesthesia Hx:  No problems with previous Anesthesia  Denies Family Hx of Anesthesia complications.   Denies Personal Hx of Anesthesia complications.   Social:  Non-Smoker    Hematology/Oncology:  Hematology Normal      Current/Recent Cancer. Oncology Comments: Bladder CA    Cardiovascular:   Hypertension Denies MI.  Denies CAD.    Denies CABG/stent.  Denies Dysrhythmias.   Denies Angina.         ECG has been reviewed.    Renal/:   Chronic Renal Disease Elevated PSA   Hepatic/GI:   GERD, well controlled    Musculoskeletal:  Musculoskeletal Normal    Neurological:  Neurology Normal    Endocrine:  Endocrine Normal    Psych:  Psychiatric Normal           Physical Exam  General:  Well nourished    Airway/Jaw/Neck:  Airway Findings: Mouth Opening: Normal Tongue: Normal  General Airway Assessment: Adult, Possible difficult intubation  Mallampati: III  Improves to II with phonation.  TM Distance: < 4 cm  Jaw/Neck Findings:  Neck ROM: Normal ROM      Dental:  Dental Findings: In tact   Chest/Lungs:  Chest/Lungs Findings: Clear to auscultation, Normal Respiratory Rate     Heart/Vascular:  Heart Findings: Rate: Normal  Rhythm: Regular Rhythm  Sounds: Normal  Heart murmur: negative       Mental Status:  Mental Status Findings:  Cooperative, Alert and Oriented         Anesthesia Plan  Type of Anesthesia, risks & benefits discussed:  Anesthesia Type:  general  Patient's Preference:   Intra-op Monitoring Plan: standard ASA monitors  Intra-op Monitoring Plan Comments:   Post Op Pain Control Plan:   Post Op Pain Control Plan Comments:   Induction:   IV  Beta Blocker:  Patient is not currently on a Beta-Blocker  (No further documentation required).       Informed Consent: Patient understands risks and agrees with Anesthesia plan.  Questions answered.   ASA Score: 2     Day of Surgery Review of History & Physical:    H&P update referred to the surgeon.         Ready For Surgery From Anesthesia Perspective.

## 2019-07-25 NOTE — PLAN OF CARE
De Leon cath 20 cc take out of ballon now off traction stat lock on r thigh   Pain 4 /10  Encouraged po hydration

## 2019-07-25 NOTE — PROGRESS NOTES
No complaints.  De Leon draining light pink urine.  Wife at bedside.  To be seen by MD prior to discharge

## 2019-07-25 NOTE — TRANSFER OF CARE
"Anesthesia Transfer of Care Note    Patient: Edwin Sepulveda III    Procedure(s) Performed: Procedure(s) (LRB):  TURBT (TRANSURETHRAL RESECTION OF BLADDER TUMOR) (N/A)  DILATION, URETHRA (N/A)    Patient location: PACU    Anesthesia Type: general    Transport from OR: Transported from OR on 2-3 L/min O2 by NC with adequate spontaneous ventilation    Post pain: adequate analgesia    Post assessment: no apparent anesthetic complications and tolerated procedure well    Post vital signs: stable    Level of consciousness: awake, alert and oriented    Nausea/Vomiting: no nausea/vomiting    Complications: none    Transfer of care protocol was followed      Last vitals:   Visit Vitals  BP (!) 162/77 (BP Location: Left arm, Patient Position: Sitting)   Pulse 68   Temp 36.9 °C (98.4 °F) (Temporal)   Resp 16   Ht 5' 9" (1.753 m)   Wt 78.5 kg (173 lb)   SpO2 96%   BMI 25.55 kg/m²     "

## 2019-07-25 NOTE — BRIEF OP NOTE
[facility]  Brief Operative/Discharge Note    Date: 07/25/2019    Staff Surgeon: Kermit Beltran MD    Pre-Op Diagnosis:   Bladder tumor  History of bladder cancer  Urethral stricture    Post-Op Diagnosis: same    Procedure(s) Performed:   Transurethral resection of bladder tumor 2-5cm  Cystoscopy with serial dilation of urethral stricture    Specimen(s): left lateral wall bladder tumor, prostatic urethra    Anesthesia: General LMA anesthesia    Findings:   - approx 2.5-3cm area of erythema, hypervascularity, frondular fibrinous tissue and calcification at area of previous resection left lateral wall extending towards bladder neck  - erythema and hypervascularity extending onto bladder neck at prostatic urethra  - narrowed urethral segment at level of sphincter so serially dilated from 18-28 french after which resectoscope was able to pass for TURBT    Estimated Blood Loss: minimal    Drains: 22fr gr    Complications: none    Disposition: pacu    Discharge home today status post uncomplicated procedure as above  Diet - resume home diet  Follow up: 7/30/19 by 0900 for gr removal (if path back will discuss, if not will plan time following week)  Instructions: drink plenty of water, may see blood in urine, no aspirin fish oil vit E bloodthinners x3 days, avoid strenuous activity with gr in place, no pushing/straining for BMs (stool softeners if needed)  Meds:     Medication List      CONTINUE taking these medications    ciprofloxacin HCl 500 MG tablet  Commonly known as:  CIPRO  Take 1 tablet (500 mg total) by mouth 2 (two) times daily.     lidocaine HCL 2% 2 % jelly  Commonly known as:  XYLOCAINE  Apply topically as needed. Gr irritation     traMADol 50 mg tablet  Commonly known as:  ULTRAM  Take 1 tablet (50 mg total) by mouth every 6 (six) hours as needed (pain not relieved by otc agents).

## 2019-07-26 ENCOUNTER — PATIENT MESSAGE (OUTPATIENT)
Dept: UROLOGY | Facility: CLINIC | Age: 64
End: 2019-07-26

## 2019-07-26 VITALS
SYSTOLIC BLOOD PRESSURE: 156 MMHG | HEIGHT: 69 IN | TEMPERATURE: 98 F | WEIGHT: 173 LBS | OXYGEN SATURATION: 96 % | DIASTOLIC BLOOD PRESSURE: 83 MMHG | RESPIRATION RATE: 16 BRPM | HEART RATE: 73 BPM | BODY MASS INDEX: 25.62 KG/M2

## 2019-07-26 NOTE — OP NOTE
[facility] Urology Operative Report     Date: 07/25/2019     Staff Surgeon: Kermit Beltran MD     Pre-Op Diagnosis:   Bladder tumor  History of bladder cancer  Urethral stricture     Post-Op Diagnosis: same     Procedure(s) Performed:   Transurethral resection of bladder tumor 2-5cm   Cystoscopy with serial dilation of urethral stricture     Specimen(s):   1. left lateral wall bladder tumor  2. prostatic urethra    Anesthesia: General LMA anesthesia     Findings:   - approx 2.5-3cm area of erythema, hypervascularity, frondular fibrinous tissue and calcification at area of previous resection left lateral wall extending towards bladder neck  - erythema and hypervascularity extending onto bladder neck at prostatic urethra  - narrowed urethral segment at level of sphincter so serially dilated from 18-28 Lithuanian after which resectoscope was able to pass for TURBT     Estimated Blood Loss: minimal     Drains: 22fr gr     Complications: none    Indications for procedure:  64-year-old man with history of external beam radiation for prostate cancer in 2014, history of kidney stones, and most recently diagnosed with a bladder tumor in January 2019 after presenting with gross hematuria.  Cystoscopy in February 2019 by Dr. Santos revealed patent urethra the radiation changes of the bladder neck and 4 cm aggregate of typical TCC lesions in the left bladder wall, and was therefore taken for TURBT on 3/13/19 of which only a portion was resected and the remainder was fulgurated, and pathology revealed low-grade papillary urothelial carcinoma, noninvasive.  He did experience brief urinary retention after Gr catheter was removed on postop day 2, and then presented on 5/13/19 for catheterization and mitomycin-C instillation at [facility] during which there was difficult catheterization and subsequent urinary retention for which he ultimately passed a fill and pull voiding trial after using Flomax which he has since  discontinued. Cystoscopic evaluation 7/9/19 revealed narrow bulbar urethral stricture just distal to the sphincter which was able to passively dilate with flexible cystoscope and a 3 cm bladder tumor/growth from left lateral bladder wall at site of previous known tumor appearing more granulomatous, Gray, and frondular in appearance rather than classic papillary appearance. He presents today for TURBT with possible DVIU/dilation after discussion of all risks and benefits and appropriate informed consent.    Procedure in detail:  The patient was taken to the operating room and placed in the lithotomy position. He was prepped and draped in standard sterile fashion, preoperative antibiotics administered, and a WHO approved time-out performed.    A 22 Greenlandic rigid Olympus cystoscope was passed into the urethra up to point of previously noted stricture which was still seen to have some passive dilation and mild shearing of tissue just at the level of the sphincter.  As previously, cystoscopically, the narrowing and stricture was appreciated just distal to the sphincter, this entire narrowed area was actually at the level of the sphincter and not amenable to direct vision internal urethrotomy.  The 22 Greenlandic scope did however pass through it gently with some more mild passive dilation and was able to passed to the bladder noting significant lateral lobe obstruction of the prostate and a high bladder neck with small early bullous projection from bladder neck/prostatic urethra, and abnormal findings as noted above at the site of previous resection on the left lateral bladder wall just posterior lateral to the ureteral orifice.  Though the 22 Greenlandic scope was able to reach the bladder and visualized these findings, it was noted to be quite tight within the urethra and had minimal movement, with resistance from the strictured area, and given the difficulty with motion of the 22 Greenlandic scope, the resectoscope would not pass  this area without dilation of the urethral stricture.    As the strictured area was amenable to dilation, before proceeding with transurethral resection, serial urethral dilation was performed using Tamera metal sounds from 18-28 Tamazight, gently passing each to the bladder by seeing return of urine along side.     After serial dilation of his urethral stricture performed, the visual obturator of the resectoscope was assembled and passed into the bladder via the urethra noting good passive dilation of this urethral stricture and findings as above.  Transurethral resection of the left lateral wall bladder tumor/area of abnormal mucosa at site of previous resection was performed systematically, keeping the left ureteral orifice in view the entire time in making sure the resection bed state posterior to it without involving it.  All abnormal tissue of this approximate 2.5-3 cm area along the left lateral bladder wall extending towards the bladder neck was resected and the edges and base of resection fulgurated with the loop electrode until noted to be hemostatic.  Ellik evacuator was used to remove the specimen to send for pathologic analysis.    As the hypervascularity in abnormal tissue of this left lateral wall bladder lesion extended up to the bladder neck contiguous with prostatic urethra, the resectoscope loop was then used to sample the prostatic urethra and included the bollous out growth at the bladder neck from the prostatic urethra.  Both the loop and button electrode were used to fulgurate and cauterize this area as it was a bit more hypervascular, but ultimately noted to be hemostatic with water and suction off.  The Ellik evacuator was used to remove the prostatic urethral specimen to send for pathology as well.    The bladder was drained and then reinstilled and inspected to make sure all specimen was removed from the bladder, and bed of resection of bladder tumor was hemostatic.  The left ureteral  orifice was viewed until efflux was seen.  The scope was slowly withdrawn back into the prostatic urethra spot fulgurating any areas irritated by passage of the scope.  With the water off the scope was slowly withdrawn through the prostatic urethra and past the area of stricture dilation noting it to be hemostatic with the water and suction off and therefore the resectoscope was removed and a 22 Arabic Gr catheter was placed with 40 cc in a 30cc balloon.  Given the bladder neck and prostatic urethral resection, the Gr catheter was taped to traction with Mastisol and silk tape on the patient's thigh and to drainage bag, after 60 cc catheter tip syringe was used to manually irrigate.    The patient tolerated the procedure well there were no complications he was awakened taken to PACU without incident.     Disposition: Home today status post uncomplicated procedure as above.  1 hr postop, his Gr will be removed from traction and 20 cc will be removed from the balloon, leaving 20 cc remaining.  No mitomycin will be used as the resection was deep on the left lateral bladder wall and given the extension to in involvement of bladder neck and prostatic urethra and manipulation of urethral stricture, did not want to increase risk of absorption.   Follow up: 7/30/19 by 0900 for gr removal (if path back will discuss, if not will plan time following week)

## 2019-07-30 ENCOUNTER — CLINICAL SUPPORT (OUTPATIENT)
Dept: UROLOGY | Facility: CLINIC | Age: 64
End: 2019-07-30
Payer: MEDICARE

## 2019-07-30 DIAGNOSIS — C67.2 MALIGNANT NEOPLASM OF LATERAL WALL OF BLADDER: Primary | ICD-10-CM

## 2019-07-30 NOTE — PROGRESS NOTES
Patient here today to have his catheter removed post TURBT.      20ml saline removed from catheter balloon.   Catheter removed with no difficulty.   Leg bag contains 50ml of clear/yellow urine.   Patient instructed to drink 6-8 eight oz glasses on non-cafeinated beverages.  If unable to urinate by 2:30 this afternoon, come back to the clinic to have catheter replaced.

## 2019-08-03 NOTE — PROGRESS NOTES
[facility] Urology Progress Note    Edwin Sepulveda III is a 64 y.o. male ith history of bladder cancer who presents following TURBT with urethral dilation.    He has a  history of external beam radiation for prostate cancer in 2014, history of kidney stones, and most recently diagnosed with a bladder tumor in January 2019 after presenting with gross hematuria.  Cystoscopy in February 2019 by Dr. Santos revealed patent urethra the radiation changes of the bladder neck and 4 cm aggregate of typical TCC lesions in the left bladder wall, and was therefore taken for TURBT on 3/13/19 of which only a portion was resected and the remainder was fulgurated, and pathology revealed low-grade papillary urothelial carcinoma, noninvasive. He did experience brief urinary retention after De Leon catheter was removed on postop day 2, and then presented on 5/13/19 for catheterization and mitomycin-C instillation at [facility] during which there was difficult catheterization and subsequent urinary retention for which he ultimately passed a fill and pull voiding trial after using Flomax which he has since discontinued. Cystoscopic evaluation 7/9/19 revealed narrow bulbar urethral stricture just distal to the sphincter which was able to passively dilate with flexible cystoscope and a 3 cm bladder tumor/growth from left lateral bladder wall at site of previous known tumor appearing more granulomatous, Gray, and frondular in appearance rather than classic papillary appearance.    7/25/19: TURBT with urethral dilation  - approx 2.5-3cm area of erythema, hypervascularity, frondular fibrinous tissue and calcification at area of previous resection left lateral wall extending towards bladder neck  - erythema and hypervascularity extending onto bladder neck at prostatic urethra  - narrowed urethral segment at level of sphincter so serially dilated from 18-28 Swedish after which resectoscope was able to pass for TURBT    PATH:   1. . URINARY  "BLADDER TUMOR, LEFT LATERAL WALL:  - Extensive necrosis with acute inflammation, dystrophic calcification, focal metaplastic bone formation and reactive fibrosis; consistent with previous resection site (see comment).  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  - Focal cystitis cystica. Focal squamous metaplasia. No evidence of malignancy.  2. PROSTATIC URETHRA, TRANSURETHRAL RESECTION:  - Benign prostatic hyperplasia, stromal predominant. Benign prostatic glands with cellular atypia consistent with radiation atypia.  - Focal urethritis cystica. Focal basal cell hyperplasia. No evidence of malignancy.    He returns today noting:  He has also had history of R orchiectomy for spermatocele and history of kidney stones.  PSA 0.41 on 7/8/19.  Still feels like some irritation in bladder and urethra  Burning is mostly gone. Urgency is improving.  Stream is good many times, sometimes shower head  No blood in urine.  AUA SS: 10/3 (3 intermittency, 2: urgency, sleeping, 1: emptying, frequency, weak stream)  Udip  Mod blood, small leuks  PVR 41cc    ROS: A comprehensive 10 system review was performed and is negative except as noted above in HPI    PHYSICAL EXAM:    Vitals:    08/05/19 0828   BP: (!) 141/83   Pulse: 64   Resp: 18     Body mass index is 25.39 kg/m². Weight: 78 kg (171 lb 15.3 oz) Height: 5' 9" (175.3 cm)       General: Alert, cooperative, no distress, appears stated age   Head: Normocephalic, without obvious abnormality, atraumatic   Eyes: PERRL, conjunctiva/corneas clear   Lungs: Respirations unlabored   Heart: Warm and well perfused   Abdomen: soft nt nd  Extremities: Extremities normal, atraumatic, no cyanosis or edema   Skin: Skin color, texture, turgor normal, no rashes or lesions   Psych: Appropriate   Neurologic: Non-focal       Recent Results (from the past 336 hour(s))   POCT URINE DIPSTICK WITHOUT MICROSCOPE    Collection Time: " 08/05/19  8:39 AM   Result Value Ref Range    Color, UA yellow     Spec Grav UA 1.010     pH, UA 7     WBC, UA small     Nitrite, UA neg     Protein neg     Glucose, UA neg     Ketones, UA neg     Urobilinogen, UA 0.2     Bilirubin neg     Blood, UA mod        ASSESSMENT   1. History of bladder cancer  POCT URINE DIPSTICK WITHOUT MICROSCOPE    Case Request Operating Room: CYSTOSCOPY    Place in Outpatient   2. History of prostate cancer  Prostate Specific Antigen, Diagnostic   3. H/O urethral stricture  POCT Bladder Scan    Case Request Operating Room: CYSTOSCOPY    Place in Outpatient   4. Cells and casts in urine  Urine culture       Plan    Given low grade papillary UC in past with current negative resection. No further treatment needed. Risk benefit discussion about 3 vs 6 mos surveillance cysto. Given treatment of urethral stricture with most recent resection, will start with 3 month surveillance cystoscopy to surveil both bladder tumor and stricture recurrence. As he had low grade noninvasive urothelial carcinoma, if 3 mos cysto negative, will go to 6 mo schedule. Will get psa prior to continue to follow his post XRT trends. Will monitor LUTS at follow up as currently voiding well post XRT despite some prostatic obstruction.  We did discuss signs and symptoms of recurrence of urethral stricture and encouraged him to contact us should his obstructive urinary symptoms return or worsen prior to 3 months as we can always evaluate sooner.  We did discuss findings of cystitis cystica and urethritis cystica as chronic inflammatory findings, though he has never had urinary tract infections and does not have any symptoms as such and will continue to observe.  Should any dysuria or irritative symptoms persist can consider a low-dose course of antibiotics as suppression, but I do not feel it is indicated at this time.  We did discuss urgency and frequency after transurethral resection of both bladder and prostate, as  well as with relief of obstruction as his stricture was dilated.  The natural course of these is that over 4-6 weeks should improve, and as he is 2 weeks postoperative he is already finding improvement, and will continue to observe.  With some variations in his urinary stream, and slightly obstructing prostate tissue residual, advised that he can try Flomax for 1 week and see if it makes a difference.  He has a supply at home and will start and see if he improves.  If he is doing well on it, can discuss continuing versus discontinuing.  He is intermittently used in the past with relief of obstructive symptoms and done well upon discontinuing it, so will check in with him on this matter.  Cysto scheduled at [facility] on 10/29/19, and psa scheduled prior   25 mins spent in encounter, over half in counseling, all questions pt and wife had were answered

## 2019-08-05 ENCOUNTER — PATIENT MESSAGE (OUTPATIENT)
Dept: SURGERY | Facility: AMBULARY SURGERY CENTER | Age: 64
End: 2019-08-05

## 2019-08-05 ENCOUNTER — OFFICE VISIT (OUTPATIENT)
Dept: UROLOGY | Facility: CLINIC | Age: 64
End: 2019-08-05
Payer: MEDICARE

## 2019-08-05 VITALS
HEART RATE: 64 BPM | HEIGHT: 69 IN | WEIGHT: 171.94 LBS | SYSTOLIC BLOOD PRESSURE: 141 MMHG | RESPIRATION RATE: 18 BRPM | DIASTOLIC BLOOD PRESSURE: 83 MMHG | BODY MASS INDEX: 25.47 KG/M2

## 2019-08-05 DIAGNOSIS — Z85.46 HISTORY OF PROSTATE CANCER: ICD-10-CM

## 2019-08-05 DIAGNOSIS — R82.998 CELLS AND CASTS IN URINE: ICD-10-CM

## 2019-08-05 DIAGNOSIS — Z85.51 HISTORY OF BLADDER CANCER: Primary | ICD-10-CM

## 2019-08-05 DIAGNOSIS — Z87.448 H/O URETHRAL STRICTURE: ICD-10-CM

## 2019-08-05 LAB
BILIRUB SERPL-MCNC: ABNORMAL MG/DL
BLOOD URINE, POC: ABNORMAL
COLOR, POC UA: YELLOW
GLUCOSE UR QL STRIP: ABNORMAL
KETONES UR QL STRIP: ABNORMAL
LEUKOCYTE ESTERASE URINE, POC: ABNORMAL
NITRITE, POC UA: ABNORMAL
PH, POC UA: 7
POC RESIDUAL URINE VOLUME: 41 ML (ref 0–100)
PROTEIN, POC: ABNORMAL
SPECIFIC GRAVITY, POC UA: 1.01
UROBILINOGEN, POC UA: 0.2

## 2019-08-05 PROCEDURE — 3079F PR MOST RECENT DIASTOLIC BLOOD PRESSURE 80-89 MM HG: ICD-10-PCS | Mod: CPTII,S$GLB,, | Performed by: UROLOGY

## 2019-08-05 PROCEDURE — 3079F DIAST BP 80-89 MM HG: CPT | Mod: CPTII,S$GLB,, | Performed by: UROLOGY

## 2019-08-05 PROCEDURE — 3077F SYST BP >= 140 MM HG: CPT | Mod: CPTII,S$GLB,, | Performed by: UROLOGY

## 2019-08-05 PROCEDURE — 99214 PR OFFICE/OUTPT VISIT, EST, LEVL IV, 30-39 MIN: ICD-10-PCS | Mod: 25,S$GLB,, | Performed by: UROLOGY

## 2019-08-05 PROCEDURE — 99214 OFFICE O/P EST MOD 30 MIN: CPT | Mod: 25,S$GLB,, | Performed by: UROLOGY

## 2019-08-05 PROCEDURE — 87086 URINE CULTURE/COLONY COUNT: CPT

## 2019-08-05 PROCEDURE — 3077F PR MOST RECENT SYSTOLIC BLOOD PRESSURE >= 140 MM HG: ICD-10-PCS | Mod: CPTII,S$GLB,, | Performed by: UROLOGY

## 2019-08-05 PROCEDURE — 1101F PR PT FALLS ASSESS DOC 0-1 FALLS W/OUT INJ PAST YR: ICD-10-PCS | Mod: CPTII,S$GLB,, | Performed by: UROLOGY

## 2019-08-05 PROCEDURE — 3008F PR BODY MASS INDEX (BMI) DOCUMENTED: ICD-10-PCS | Mod: CPTII,S$GLB,, | Performed by: UROLOGY

## 2019-08-05 PROCEDURE — 1101F PT FALLS ASSESS-DOCD LE1/YR: CPT | Mod: CPTII,S$GLB,, | Performed by: UROLOGY

## 2019-08-05 PROCEDURE — 51798 POCT BLADDER SCAN: ICD-10-PCS | Mod: S$GLB,,, | Performed by: UROLOGY

## 2019-08-05 PROCEDURE — 51798 US URINE CAPACITY MEASURE: CPT | Mod: S$GLB,,, | Performed by: UROLOGY

## 2019-08-05 PROCEDURE — 81002 URINALYSIS NONAUTO W/O SCOPE: CPT | Mod: S$GLB,,, | Performed by: UROLOGY

## 2019-08-05 PROCEDURE — 99999 PR PBB SHADOW E&M-EST. PATIENT-LVL IV: ICD-10-PCS | Mod: PBBFAC,,, | Performed by: UROLOGY

## 2019-08-05 PROCEDURE — 81002 POCT URINE DIPSTICK WITHOUT MICROSCOPE: ICD-10-PCS | Mod: S$GLB,,, | Performed by: UROLOGY

## 2019-08-05 PROCEDURE — 99999 PR PBB SHADOW E&M-EST. PATIENT-LVL IV: CPT | Mod: PBBFAC,,, | Performed by: UROLOGY

## 2019-08-05 PROCEDURE — 3008F BODY MASS INDEX DOCD: CPT | Mod: CPTII,S$GLB,, | Performed by: UROLOGY

## 2019-08-05 RX ORDER — LIDOCAINE HYDROCHLORIDE 20 MG/ML
JELLY TOPICAL ONCE
Status: CANCELLED | OUTPATIENT
Start: 2019-08-05 | End: 2019-08-05

## 2019-08-07 LAB — BACTERIA UR CULT: NO GROWTH

## 2019-08-14 ENCOUNTER — PATIENT MESSAGE (OUTPATIENT)
Dept: SURGERY | Facility: AMBULARY SURGERY CENTER | Age: 64
End: 2019-08-14

## 2019-08-16 ENCOUNTER — CLINICAL SUPPORT (OUTPATIENT)
Dept: UROLOGY | Facility: CLINIC | Age: 64
End: 2019-08-16
Payer: MEDICARE

## 2019-08-16 DIAGNOSIS — C67.2 MALIGNANT NEOPLASM OF LATERAL WALL OF BLADDER: Primary | ICD-10-CM

## 2019-08-16 DIAGNOSIS — R30.0 DYSURIA: ICD-10-CM

## 2019-08-16 LAB
BILIRUB SERPL-MCNC: NEGATIVE MG/DL
BLOOD URINE, POC: ABNORMAL
COLOR, POC UA: ABNORMAL
GLUCOSE UR QL STRIP: NEGATIVE
KETONES UR QL STRIP: NEGATIVE
LEUKOCYTE ESTERASE URINE, POC: ABNORMAL
NITRITE, POC UA: NEGATIVE
PH, POC UA: 7
PROTEIN, POC: NEGATIVE
SPECIFIC GRAVITY, POC UA: 1.01
UROBILINOGEN, POC UA: NEGATIVE

## 2019-08-16 PROCEDURE — 81002 URINALYSIS NONAUTO W/O SCOPE: CPT | Mod: HCNC,S$GLB,, | Performed by: UROLOGY

## 2019-08-16 PROCEDURE — 87086 URINE CULTURE/COLONY COUNT: CPT | Mod: HCNC

## 2019-08-16 PROCEDURE — 81002 POCT URINE DIPSTICK WITHOUT MICROSCOPE: ICD-10-PCS | Mod: HCNC,S$GLB,, | Performed by: UROLOGY

## 2019-08-16 NOTE — PROGRESS NOTES
Patient c/o dysuria mostly in the morning.   udip shows small leukocytes.   Will send urine for culture.

## 2019-08-18 LAB — BACTERIA UR CULT: NO GROWTH

## 2019-09-09 ENCOUNTER — PATIENT MESSAGE (OUTPATIENT)
Dept: SURGERY | Facility: AMBULARY SURGERY CENTER | Age: 64
End: 2019-09-09

## 2019-10-18 ENCOUNTER — PATIENT MESSAGE (OUTPATIENT)
Dept: SURGERY | Facility: AMBULARY SURGERY CENTER | Age: 64
End: 2019-10-18

## 2019-10-18 RX ORDER — CIPROFLOXACIN 500 MG/1
500 TABLET ORAL 2 TIMES DAILY
Qty: 4 TABLET | Refills: 0 | Status: ON HOLD | OUTPATIENT
Start: 2019-10-18 | End: 2019-10-29 | Stop reason: HOSPADM

## 2019-10-21 ENCOUNTER — PATIENT MESSAGE (OUTPATIENT)
Dept: SURGERY | Facility: AMBULARY SURGERY CENTER | Age: 64
End: 2019-10-21

## 2019-10-22 ENCOUNTER — PATIENT MESSAGE (OUTPATIENT)
Dept: SURGERY | Facility: AMBULARY SURGERY CENTER | Age: 64
End: 2019-10-22

## 2019-10-22 ENCOUNTER — LAB VISIT (OUTPATIENT)
Dept: LAB | Facility: HOSPITAL | Age: 64
End: 2019-10-22
Attending: UROLOGY
Payer: MEDICARE

## 2019-10-22 DIAGNOSIS — Z85.46 HISTORY OF PROSTATE CANCER: ICD-10-CM

## 2019-10-22 LAB — COMPLEXED PSA SERPL-MCNC: 0.28 NG/ML (ref 0–4)

## 2019-10-22 PROCEDURE — 36415 COLL VENOUS BLD VENIPUNCTURE: CPT | Mod: HCNC

## 2019-10-22 PROCEDURE — 84153 ASSAY OF PSA TOTAL: CPT | Mod: HCNC

## 2019-10-28 ENCOUNTER — PATIENT MESSAGE (OUTPATIENT)
Dept: SURGERY | Facility: AMBULARY SURGERY CENTER | Age: 64
End: 2019-10-28

## 2019-10-29 ENCOUNTER — LAB VISIT (OUTPATIENT)
Dept: LAB | Facility: HOSPITAL | Age: 64
End: 2019-10-29
Attending: UROLOGY
Payer: MEDICARE

## 2019-10-29 ENCOUNTER — HOSPITAL ENCOUNTER (OUTPATIENT)
Facility: AMBULARY SURGERY CENTER | Age: 64
Discharge: HOME | End: 2019-10-29
Attending: UROLOGY | Admitting: UROLOGY
Payer: MEDICARE

## 2019-10-29 DIAGNOSIS — Z87.448 H/O URETHRAL STRICTURE: ICD-10-CM

## 2019-10-29 DIAGNOSIS — Z85.51 HISTORY OF BLADDER CANCER: ICD-10-CM

## 2019-10-29 DIAGNOSIS — Z85.51 HISTORY OF BLADDER CANCER: Primary | ICD-10-CM

## 2019-10-29 LAB
ANION GAP SERPL CALC-SCNC: 6 MMOL/L (ref 8–16)
BASOPHILS # BLD AUTO: 0.03 K/UL (ref 0–0.2)
BASOPHILS NFR BLD: 0.7 % (ref 0–1.9)
BUN SERPL-MCNC: 14 MG/DL (ref 8–23)
CALCIUM SERPL-MCNC: 9.8 MG/DL (ref 8.7–10.5)
CHLORIDE SERPL-SCNC: 106 MMOL/L (ref 95–110)
CO2 SERPL-SCNC: 31 MMOL/L (ref 23–29)
CREAT SERPL-MCNC: 0.9 MG/DL (ref 0.5–1.4)
DIFFERENTIAL METHOD: ABNORMAL
EOSINOPHIL # BLD AUTO: 0 K/UL (ref 0–0.5)
EOSINOPHIL NFR BLD: 0.7 % (ref 0–8)
ERYTHROCYTE [DISTWIDTH] IN BLOOD BY AUTOMATED COUNT: 12.8 % (ref 11.5–14.5)
EST. GFR  (AFRICAN AMERICAN): >60 ML/MIN/1.73 M^2
EST. GFR  (NON AFRICAN AMERICAN): >60 ML/MIN/1.73 M^2
GLUCOSE SERPL-MCNC: 103 MG/DL (ref 70–110)
HCT VFR BLD AUTO: 45.9 % (ref 40–54)
HGB BLD-MCNC: 15.5 G/DL (ref 14–18)
IMM GRANULOCYTES # BLD AUTO: 0.01 K/UL (ref 0–0.04)
LYMPHOCYTES # BLD AUTO: 0.8 K/UL (ref 1–4.8)
LYMPHOCYTES NFR BLD: 18.1 % (ref 18–48)
MCH RBC QN AUTO: 30.9 PG (ref 27–31)
MCHC RBC AUTO-ENTMCNC: 33.8 G/DL (ref 32–36)
MCV RBC AUTO: 91 FL (ref 82–98)
MONOCYTES # BLD AUTO: 0.4 K/UL (ref 0.3–1)
MONOCYTES NFR BLD: 8.5 % (ref 4–15)
NEUTROPHILS # BLD AUTO: 3.3 K/UL (ref 1.8–7.7)
NEUTROPHILS NFR BLD: 71.8 % (ref 38–73)
NRBC BLD-RTO: 0 /100 WBC
PLATELET # BLD AUTO: 161 K/UL (ref 150–350)
PMV BLD AUTO: 10.3 FL (ref 9.2–12.9)
POTASSIUM SERPL-SCNC: 5 MMOL/L (ref 3.5–5.1)
RBC # BLD AUTO: 5.02 M/UL (ref 4.6–6.2)
SODIUM SERPL-SCNC: 143 MMOL/L (ref 136–145)
WBC # BLD AUTO: 4.59 K/UL (ref 3.9–12.7)

## 2019-10-29 PROCEDURE — 85025 COMPLETE CBC W/AUTO DIFF WBC: CPT | Mod: HCNC

## 2019-10-29 PROCEDURE — 52000 CYSTOURETHROSCOPY: CPT | Performed by: UROLOGY

## 2019-10-29 PROCEDURE — 52000 CYSTOURETHROSCOPY: CPT | Mod: HCNC,,, | Performed by: UROLOGY

## 2019-10-29 PROCEDURE — 36415 COLL VENOUS BLD VENIPUNCTURE: CPT | Mod: HCNC

## 2019-10-29 PROCEDURE — 88112 CYTOPATH CELL ENHANCE TECH: CPT | Mod: HCNC | Performed by: PATHOLOGY

## 2019-10-29 PROCEDURE — 52000 PR CYSTOURETHROSCOPY: ICD-10-PCS | Mod: HCNC,,, | Performed by: UROLOGY

## 2019-10-29 PROCEDURE — 80048 BASIC METABOLIC PNL TOTAL CA: CPT | Mod: HCNC

## 2019-10-29 PROCEDURE — 88112 CYTOLOGY SPECIMEN-URINE: ICD-10-PCS | Mod: 26,HCNC,, | Performed by: PATHOLOGY

## 2019-10-29 PROCEDURE — 87086 URINE CULTURE/COLONY COUNT: CPT | Mod: HCNC

## 2019-10-29 PROCEDURE — 88112 CYTOPATH CELL ENHANCE TECH: CPT | Mod: 26,HCNC,, | Performed by: PATHOLOGY

## 2019-10-29 RX ORDER — LIDOCAINE HYDROCHLORIDE 20 MG/ML
JELLY TOPICAL ONCE
Status: COMPLETED | OUTPATIENT
Start: 2019-10-29 | End: 2019-10-29

## 2019-10-29 RX ORDER — SULFAMETHOXAZOLE AND TRIMETHOPRIM 800; 160 MG/1; MG/1
1 TABLET ORAL DAILY
Qty: 14 TABLET | Refills: 0 | Status: SHIPPED | OUTPATIENT
Start: 2019-10-29 | End: 2019-11-12

## 2019-10-29 RX ORDER — CIPROFLOXACIN 2 MG/ML
400 INJECTION, SOLUTION INTRAVENOUS
Status: CANCELLED | OUTPATIENT
Start: 2019-10-29

## 2019-10-29 RX ORDER — LIDOCAINE HYDROCHLORIDE 20 MG/ML
JELLY TOPICAL
Status: DISCONTINUED
Start: 2019-10-29 | End: 2019-10-29 | Stop reason: HOSPADM

## 2019-10-29 RX ORDER — WATER 1 ML/ML
IRRIGANT IRRIGATION
Status: DISCONTINUED | OUTPATIENT
Start: 2019-10-29 | End: 2019-10-29 | Stop reason: HOSPADM

## 2019-10-29 RX ADMIN — LIDOCAINE HYDROCHLORIDE 5 ML: 20 JELLY TOPICAL at 12:10

## 2019-10-29 NOTE — OR NURSING
Pt returned at 1600 states unable to void, Dr Beltran put 20 g De Leon in, leg bag secured to leg, , pt dcd to home

## 2019-10-29 NOTE — PLAN OF CARE
Pt returned to ooss; could not void; catheter inserted per Dr. Beltran ;pt instructed on removal tomorrow .

## 2019-10-29 NOTE — H&P (VIEW-ONLY)
[facility] Urology Progress Note     Edwin Sepulveda III is a 64 y.o. male ith history of bladder cancer who presents following TURBT with urethral dilation.     He has a  history of external beam radiation for prostate cancer in 2014, history of kidney stones, and most recently diagnosed with a bladder tumor in January 2019 after presenting with gross hematuria.  Cystoscopy in February 2019 by Dr. Santos revealed patent urethra the radiation changes of the bladder neck and 4 cm aggregate of typical TCC lesions in the left bladder wall, and was therefore taken for TURBT on 3/13/19 of which only a portion was resected and the remainder was fulgurated, and pathology revealed low-grade papillary urothelial carcinoma, noninvasive. He did experience brief urinary retention after De Leon catheter was removed on postop day 2, and then presented on 5/13/19 for catheterization and mitomycin-C instillation at [facility] during which there was difficult catheterization and subsequent urinary retention for which he ultimately passed a fill and pull voiding trial after using Flomax which he has since discontinued. Cystoscopic evaluation 7/9/19 revealed narrow bulbar urethral stricture just distal to the sphincter which was able to passively dilate with flexible cystoscope and a 3 cm bladder tumor/growth from left lateral bladder wall at site of previous known tumor appearing more granulomatous, Gray, and frondular in appearance rather than classic papillary appearance.     7/25/19: TURBT with urethral dilation  - approx 2.5-3cm area of erythema, hypervascularity, frondular fibrinous tissue and calcification at area of previous resection left lateral wall extending towards bladder neck  - erythema and hypervascularity extending onto bladder neck at prostatic urethra  - narrowed urethral segment at level of sphincter so serially dilated from 18-28 Belgian after which resectoscope was able to pass for TURBT     PATH:   1. .  "URINARY BLADDER TUMOR, LEFT LATERAL WALL:  - Extensive necrosis with acute inflammation, dystrophic calcification, focal metaplastic bone formation and reactive fibrosis; consistent with previous resection site (see comment).  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  - Focal cystitis cystica. Focal squamous metaplasia. No evidence of malignancy.  2. PROSTATIC URETHRA, TRANSURETHRAL RESECTION:  - Benign prostatic hyperplasia, stromal predominant. Benign prostatic glands with cellular atypia consistent with radiation atypia.  - Focal urethritis cystica. Focal basal cell hyperplasia. No evidence of malignancy.     He returns today noting:  He has also had history of R orchiectomy for spermatocele and history of kidney stones.  PSA 0.41 on 7/8/19.  Still feels like some irritation in bladder and urethra  Burning is mostly gone. Urgency is improving.  Stream is good many times, sometimes shower head  No blood in urine.  AUA SS: 10/3 (3 intermittency, 2: urgency, sleeping, 1: emptying, frequency, weak stream)  Udip  Mod blood, small leuks  PVR 41cc     ROS: A comprehensive 10 system review was performed and is negative except as noted above in HPI     PHYSICAL EXAM:         Vitals:     08/05/19 0828   BP: (!) 141/83   Pulse: 64   Resp: 18      Body mass index is 25.39 kg/m². Weight: 78 kg (171 lb 15.3 oz) Height: 5' 9" (175.3 cm)         General: Alert, cooperative, no distress, appears stated age   Head: Normocephalic, without obvious abnormality, atraumatic   Eyes: PERRL, conjunctiva/corneas clear   Lungs: Respirations unlabored   Heart: Warm and well perfused   Abdomen: soft nt nd  Extremities: Extremities normal, atraumatic, no cyanosis or edema   Skin: Skin color, texture, turgor normal, no rashes or lesions   Psych: Appropriate   Neurologic: Non-focal         Recent Results         Recent Results (from the past 336 hour(s))   POCT URINE DIPSTICK " WITHOUT MICROSCOPE     Collection Time: 08/05/19  8:39 AM   Result Value Ref Range     Color, UA yellow       Spec Grav UA 1.010       pH, UA 7       WBC, UA small       Nitrite, UA neg       Protein neg       Glucose, UA neg       Ketones, UA neg       Urobilinogen, UA 0.2       Bilirubin neg       Blood, UA mod              ASSESSMENT   1. History of bladder cancer  POCT URINE DIPSTICK WITHOUT MICROSCOPE     Case Request Operating Room: CYSTOSCOPY     Place in Outpatient   2. History of prostate cancer  Prostate Specific Antigen, Diagnostic   3. H/O urethral stricture  POCT Bladder Scan     Case Request Operating Room: CYSTOSCOPY     Place in Outpatient   4. Cells and casts in urine  Urine culture         Plan    Given low grade papillary UC in past with current negative resection. No further treatment needed. Risk benefit discussion about 3 vs 6 mos surveillance cysto. Given treatment of urethral stricture with most recent resection, will start with 3 month surveillance cystoscopy to surveil both bladder tumor and stricture recurrence. As he had low grade noninvasive urothelial carcinoma, if 3 mos cysto negative, will go to 6 mo schedule. Will get psa prior to continue to follow his post XRT trends. Will monitor LUTS at follow up as currently voiding well post XRT despite some prostatic obstruction.  We did discuss signs and symptoms of recurrence of urethral stricture and encouraged him to contact us should his obstructive urinary symptoms return or worsen prior to 3 months as we can always evaluate sooner.  We did discuss findings of cystitis cystica and urethritis cystica as chronic inflammatory findings, though he has never had urinary tract infections and does not have any symptoms as such and will continue to observe.  Should any dysuria or irritative symptoms persist can consider a low-dose course of antibiotics as suppression, but I do not feel it is indicated at this time.  We did discuss urgency and  frequency after transurethral resection of both bladder and prostate, as well as with relief of obstruction as his stricture was dilated.  The natural course of these is that over 4-6 weeks should improve, and as he is 2 weeks postoperative he is already finding improvement, and will continue to observe.  With some variations in his urinary stream, and slightly obstructing prostate tissue residual, advised that he can try Flomax for 1 week and see if it makes a difference.  He has a supply at home and will start and see if he improves.  If he is doing well on it, can discuss continuing versus discontinuing.  He is intermittently used in the past with relief of obstructive symptoms and done well upon discontinuing it, so will check in with him on this matter.  Cysto scheduled at [facility] on 10/29/19, and psa scheduled prior       psa improved  Here for cysto  Acceptable candidate for procedure at [facility]

## 2019-10-29 NOTE — H&P
[facility] Urology Progress Note     Edwin Sepulveda III is a 64 y.o. male ith history of bladder cancer who presents following TURBT with urethral dilation.     He has a  history of external beam radiation for prostate cancer in 2014, history of kidney stones, and most recently diagnosed with a bladder tumor in January 2019 after presenting with gross hematuria.  Cystoscopy in February 2019 by Dr. Santos revealed patent urethra the radiation changes of the bladder neck and 4 cm aggregate of typical TCC lesions in the left bladder wall, and was therefore taken for TURBT on 3/13/19 of which only a portion was resected and the remainder was fulgurated, and pathology revealed low-grade papillary urothelial carcinoma, noninvasive. He did experience brief urinary retention after De Leon catheter was removed on postop day 2, and then presented on 5/13/19 for catheterization and mitomycin-C instillation at [facility] during which there was difficult catheterization and subsequent urinary retention for which he ultimately passed a fill and pull voiding trial after using Flomax which he has since discontinued. Cystoscopic evaluation 7/9/19 revealed narrow bulbar urethral stricture just distal to the sphincter which was able to passively dilate with flexible cystoscope and a 3 cm bladder tumor/growth from left lateral bladder wall at site of previous known tumor appearing more granulomatous, Gray, and frondular in appearance rather than classic papillary appearance.     7/25/19: TURBT with urethral dilation  - approx 2.5-3cm area of erythema, hypervascularity, frondular fibrinous tissue and calcification at area of previous resection left lateral wall extending towards bladder neck  - erythema and hypervascularity extending onto bladder neck at prostatic urethra  - narrowed urethral segment at level of sphincter so serially dilated from 18-28 [place] after which resectoscope was able to pass for TURBT     PATH:   1. .  "URINARY BLADDER TUMOR, LEFT LATERAL WALL:  - Extensive necrosis with acute inflammation, dystrophic calcification, focal metaplastic bone formation and reactive fibrosis; consistent with previous resection site (see comment).  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  - Focal cystitis cystica. Focal squamous metaplasia. No evidence of malignancy.  2. PROSTATIC URETHRA, TRANSURETHRAL RESECTION:  - Benign prostatic hyperplasia, stromal predominant. Benign prostatic glands with cellular atypia consistent with radiation atypia.  - Focal urethritis cystica. Focal basal cell hyperplasia. No evidence of malignancy.     He returns today noting:  He has also had history of R orchiectomy for spermatocele and history of kidney stones.  PSA 0.41 on 7/8/19.  Still feels like some irritation in bladder and urethra  Burning is mostly gone. Urgency is improving.  Stream is good many times, sometimes shower head  No blood in urine.  AUA SS: 10/3 (3 intermittency, 2: urgency, sleeping, 1: emptying, frequency, weak stream)  Udip  Mod blood, small leuks  PVR 41cc     ROS: A comprehensive 10 system review was performed and is negative except as noted above in HPI     PHYSICAL EXAM:         Vitals:     08/05/19 0828   BP: (!) 141/83   Pulse: 64   Resp: 18      Body mass index is 25.39 kg/m². Weight: 78 kg (171 lb 15.3 oz) Height: 5' 9" (175.3 cm)         General: Alert, cooperative, no distress, appears stated age   Head: Normocephalic, without obvious abnormality, atraumatic   Eyes: PERRL, conjunctiva/corneas clear   Lungs: Respirations unlabored   Heart: Warm and well perfused   Abdomen: soft nt nd  Extremities: Extremities normal, atraumatic, no cyanosis or edema   Skin: Skin color, texture, turgor normal, no rashes or lesions   Psych: Appropriate   Neurologic: Non-focal         Recent Results         Recent Results (from the past 336 hour(s))   POCT URINE DIPSTICK " WITHOUT MICROSCOPE     Collection Time: 08/05/19  8:39 AM   Result Value Ref Range     Color, UA yellow       Spec Grav UA 1.010       pH, UA 7       WBC, UA small       Nitrite, UA neg       Protein neg       Glucose, UA neg       Ketones, UA neg       Urobilinogen, UA 0.2       Bilirubin neg       Blood, UA mod              ASSESSMENT   1. History of bladder cancer  POCT URINE DIPSTICK WITHOUT MICROSCOPE     Case Request Operating Room: CYSTOSCOPY     Place in Outpatient   2. History of prostate cancer  Prostate Specific Antigen, Diagnostic   3. H/O urethral stricture  POCT Bladder Scan     Case Request Operating Room: CYSTOSCOPY     Place in Outpatient   4. Cells and casts in urine  Urine culture         Plan    Given low grade papillary UC in past with current negative resection. No further treatment needed. Risk benefit discussion about 3 vs 6 mos surveillance cysto. Given treatment of urethral stricture with most recent resection, will start with 3 month surveillance cystoscopy to surveil both bladder tumor and stricture recurrence. As he had low grade noninvasive urothelial carcinoma, if 3 mos cysto negative, will go to 6 mo schedule. Will get psa prior to continue to follow his post XRT trends. Will monitor LUTS at follow up as currently voiding well post XRT despite some prostatic obstruction.  We did discuss signs and symptoms of recurrence of urethral stricture and encouraged him to contact us should his obstructive urinary symptoms return or worsen prior to 3 months as we can always evaluate sooner.  We did discuss findings of cystitis cystica and urethritis cystica as chronic inflammatory findings, though he has never had urinary tract infections and does not have any symptoms as such and will continue to observe.  Should any dysuria or irritative symptoms persist can consider a low-dose course of antibiotics as suppression, but I do not feel it is indicated at this time.  We did discuss urgency and  frequency after transurethral resection of both bladder and prostate, as well as with relief of obstruction as his stricture was dilated.  The natural course of these is that over 4-6 weeks should improve, and as he is 2 weeks postoperative he is already finding improvement, and will continue to observe.  With some variations in his urinary stream, and slightly obstructing prostate tissue residual, advised that he can try Flomax for 1 week and see if it makes a difference.  He has a supply at home and will start and see if he improves.  If he is doing well on it, can discuss continuing versus discontinuing.  He is intermittently used in the past with relief of obstructive symptoms and done well upon discontinuing it, so will check in with him on this matter.  Cysto scheduled at [facility] on 10/29/19, and psa scheduled prior       psa improved  Here for cysto  Acceptable candidate for procedure at [facility]

## 2019-10-29 NOTE — DISCHARGE INSTRUCTIONS
After the procedure    · Drink plenty of fluids.  · You may have burning or light bleeding when you urinate--this is normal.  · Medications may be prescribed to ease any discomfort or prevent infection. Take these as directed.  · Call your doctor if you have heavy bleeding or blood clots, burning that lasts more than a day, a fever over 100°F  (38° C), or trouble urinating.    After Surgery:  Always be aware that any surgery can cause these symptoms:    Pain- Medication can be prescribed for pain to decrease your pain but may not completely take your pain away.  Over the Counter pain medicine my be enough and you can always use Ice and rest to help ease pain.    Bleeding- a little bleeding after a surgery is usually within normal.  If there is a lot of blood you need to notify your MD.  Emergency treatments of bleeding are cold application, elevation of the bleeding site and compression.    Infection- Infection after surgery is NOT a normal occurrence.  Signs of infection are fever, swelling, hot to touch the incision.  If this occurs notify your MD immediately.    Nausea- this can be common after a surgery especially if you have had anesthesia medicine or are taking pain medicine.  Staying on clear liquids, bland foods, gingerale, or over the counter anti nausea medicines can help.  If you vomit more than once, notify your MD.  Anti Nausea medicines can be prescribed.      Before leaving, please make sure you have all your personal belongings such as glasses, purses, wallets, keys, cell phones, jewelry, jackets etc After Surgery:

## 2019-10-30 ENCOUNTER — PATIENT MESSAGE (OUTPATIENT)
Dept: SURGERY | Facility: HOSPITAL | Age: 64
End: 2019-10-30

## 2019-10-30 NOTE — OP NOTE
[facility]/Brief Discharge Summary     Date: 10/29/19     Staff Surgeon: Kermit Beltran MD     Pre-Op Diagnosis: history of bladder cancer, history of XRT for prostate cancer, history of urethral stricture     Post-Op Diagnosis:   same     Procedure(s) Performed:   Cystoscopy, flexible     Specimen(s): urine cytology, urine culture     Anesthesia: Local anesthesia, urojet     Findings:   Mild recurrence of stricture just distal to the sphincter which was passable with flexible cystoscope with minimal resistance. Recurrent mild gray frondular fibrinous debris at left lateral bladder wall site of previous tumor with calcifications protruding from bladder wall at site with significant surrounding hypervascularity. Moderate amount of same gray fibrinous material extending from left bladder neck with punctate calcification as well, seen on retroflexion to be partially occluding bladder neck, though seen direct to be less burden from within urethra     Estimated Blood Loss: none     Drains: none     Complications: none     Indications for procedure:  64-year-old man with history of external beam radiation for prostate cancer in 2014, history of kidney stones, and most recently diagnosed with a bladder tumor in January 2019 after presenting with gross hematuria.  Cystoscopy in February 2019 by Dr. Santos revealed patent urethra the radiation changes of the bladder neck and 4 cm aggregate of typical TCC lesions in the left bladder wall, and was therefore taken for TURBT on 3/13/19 of which only a portion was resected and the remainder was fulgurated, and pathology revealed low-grade papillary urothelial carcinoma, noninvasive.  He did experience brief urinary retention after De Leon catheter was removed on postop day 2, and then presented on 5/13/19 for catheterization and mitomycin-C instillation at [facility] during which there was difficult catheterization and subsequent urinary  retention for which he ultimately passed a fill and pull voiding trial after using Flomax which he has since discontinued. Cystoscopic evaluation 7/9/19 revealed narrow bulbar urethral stricture just distal to the sphincter which was able to passively dilate with flexible cystoscope and a 3 cm bladder tumor/growth from left lateral bladder wall at site of previous known tumor appearing more granulomatous, Gray, and frondular in appearance rather than classic papillary appearance. He presented 7/25/19 for TURBT with resection of this mention area of hypervascularity with frondular fibrin as tissue as well as erythema and hypervascularity extending on to the bladder neck and prostatic urethra.  Urethral stricture was dilated from 18-28 [place] to pass resectoscope.  Pathology revealed extensive necrosis with acute inflammation and dystrophic calcification consistent with previous resection site as well as chronic follicular cystitis and mild reactive atypia consistent with radiation change.  At most recent evaluation he had been off Flomax with AUA symptom score 10/3 and postvoid residual of 41 cc.  He reports in the interim his bladder capacity seems to be about 200-300 cc, and he has very rare incontinence but has had no interim blood in the urine.     Procedure in detail:  After informed consent, the patient was prepped and drapped in standard cystoscopic fashion and 2% xylocaine jelly was instilled into the urethra. A flexible cystoscope was passed into the bladder via the urethra .     Anterior urethra appeared normal until point of narrow stricture just distal to the sphincter which had some resistance with scope passage, though cystoscope was ultimately able to pass through and passively dilate this area of narrowing and passed through the prostate into the bladder. Mild prostatic lateral lobe enlargement and ingrowth without gross obstruction.  Mild radiation prostatitis      The bladder was then systematically  inspected. Bladder neck demonstrated good coaptation. In area of previous tumor management and resection, on the left lateral wall just posterior lateral to the ureteral orifice and trigone, as well as on the left bladder neck, there was Johnson fibrin its tissue with calcifications as seen previously with hypervascularity surrounding. Ureteral orifice unobstructed.     The rest of his bladder mucosa appeared grossly normal with the exception of mild trabeculations   Bilateral ureteral orifices seen in their orthotopic position on the trigone bilaterally.     Pictures of urethral and bladder abnormalities  captured and placed on patient's chart. He tolerated the procedure well with no complications.      Disposition:   Given his bladder findings, discussed need to remove this obstructing tissue from near the bladder neck as well as abnormal tissue from prior resection site with biopsies.  Given burden at both bladder neck as well as left lateral bladder wall and concern for recurrence of stricture, advised performing this in the operating room.  Would attempt to avoid resection and resectoscope if at all possible given his urethral narrowing as well as this reactive change from prior resection and cautery, and to minimize the risk of any future incontinence worsening given his history of radiation.    All risks and benefits of bladder biopsy fulguration and possible urethral dilation were discussed with the patient.  This has been plan for the operating room on 11/14/19, and he will go get his preop labs today and urine culture was sent today.  Can phone preop as has had surgery in the last few months.  Given his chronic cystitis changes on pathology at last resection, and hypervascularity in similar findings found today, advised a low-dose daily suppressive course of antibiotics for 2 weeks up until the point of resection and once daily Bactrim has been prescribed to avoid long courses of fluoroquinolones.     All  questions answered and appropriate informed consent was obtained.      ** of note, after patient left uncomplicated procedure, had lunch, drink fluids, he did return stating that he was unable to urinate.  A postvoid residual by bladder scan was 100 cc only, the patient felt need to voiding could not.  He attempted to void 1 more time but was unable to pass urine and had anxiety related to previous episode of urinary retention as well as the distance he has to travel to get home.  He reported passing small tissue fragments in his initial void postprocedure, and therefore we did discuss passage of De Leon catheter manual irrigation to make sure there was no obstructing element.    After sterilely prepping the phallus, I did pass an 18 [place] straight De Leon catheter with minimal resistance into the bladder, and drained 100 cc of clear yellow urine.  60 cc catheter tip syringe was used to flush and irrigate the bladder of which there was no debris, stones, or tissue pieces irrigated free in the urine irrigated clear.    After discussion, he preferred to keep the De Leon catheter indwelling overnight, and was given a 10 cc syringe and provided instructions on De Leon catheter removal.  He was advised to do so upon awakening in the morning.  He was also advised to restart his Flomax, of which he does have a 2 week supply at home, and take it this evening, and q.h.s. until the procedure.     Discharge home today status post uncomplicated procedure as above  Diet - resume home diet  Follow up: 11/14 in OR for bladder biopsy etc  Instructions:  A remove De Leon catheter at home in morning on Wednesday 10/30/19 as directed drink plenty of water, may see blood in urine, take abx as directed, resume nightly Flomax  Meds     Medication List      START taking these medications    sulfamethoxazole-trimethoprim 800-160mg 800-160 mg Tab  Commonly known as:  BACTRIM DS  Take 1 tablet by mouth once daily. for 14 days        STOP taking these  medications    ciprofloxacin HCl 500 MG tablet  Commonly known as:  CIPRO           Where to Get Your Medications      These medications were sent to [id number] - [place] - [address] AT [facility] [address]  [address] [address]    Phone:  [phone]   · sulfamethoxazole-trimethoprim 800-160mg 800-160 mg Tab       RESUME: Flomax 0.4mg QHS

## 2019-10-31 VITALS
HEART RATE: 71 BPM | TEMPERATURE: 98 F | RESPIRATION RATE: 18 BRPM | BODY MASS INDEX: 25.47 KG/M2 | HEIGHT: 69 IN | WEIGHT: 171.94 LBS | DIASTOLIC BLOOD PRESSURE: 84 MMHG | SYSTOLIC BLOOD PRESSURE: 151 MMHG | OXYGEN SATURATION: 99 %

## 2019-10-31 LAB — BACTERIA UR CULT: NO GROWTH

## 2019-11-05 ENCOUNTER — PATIENT MESSAGE (OUTPATIENT)
Dept: SURGERY | Facility: HOSPITAL | Age: 64
End: 2019-11-05

## 2019-11-06 RX ORDER — TAMSULOSIN HYDROCHLORIDE 0.4 MG/1
0.4 CAPSULE ORAL DAILY
Status: ON HOLD | COMMUNITY
End: 2020-07-08 | Stop reason: SDUPTHER

## 2019-11-13 ENCOUNTER — ANESTHESIA EVENT (OUTPATIENT)
Dept: SURGERY | Facility: HOSPITAL | Age: 64
End: 2019-11-13
Payer: MEDICARE

## 2019-11-14 ENCOUNTER — HOSPITAL ENCOUNTER (OUTPATIENT)
Facility: HOSPITAL | Age: 64
Discharge: HOME | End: 2019-11-14
Attending: UROLOGY | Admitting: UROLOGY
Payer: MEDICARE

## 2019-11-14 ENCOUNTER — ANESTHESIA (OUTPATIENT)
Dept: SURGERY | Facility: HOSPITAL | Age: 64
End: 2019-11-14
Payer: MEDICARE

## 2019-11-14 ENCOUNTER — PATIENT MESSAGE (OUTPATIENT)
Dept: SURGERY | Facility: HOSPITAL | Age: 64
End: 2019-11-14

## 2019-11-14 DIAGNOSIS — Z85.51 HISTORY OF BLADDER CANCER: ICD-10-CM

## 2019-11-14 PROCEDURE — 27200651 HC AIRWAY, LMA: Mod: HCNC | Performed by: NURSE ANESTHETIST, CERTIFIED REGISTERED

## 2019-11-14 PROCEDURE — 37000008 HC ANESTHESIA 1ST 15 MINUTES: Mod: HCNC | Performed by: UROLOGY

## 2019-11-14 PROCEDURE — D9220A PRA ANESTHESIA: ICD-10-PCS | Mod: HCNC,CRNA,, | Performed by: NURSE ANESTHETIST, CERTIFIED REGISTERED

## 2019-11-14 PROCEDURE — 25000003 PHARM REV CODE 250: Mod: HCNC | Performed by: NURSE ANESTHETIST, CERTIFIED REGISTERED

## 2019-11-14 PROCEDURE — 63600175 PHARM REV CODE 636 W HCPCS: Mod: HCNC | Performed by: UROLOGY

## 2019-11-14 PROCEDURE — D9220A PRA ANESTHESIA: ICD-10-PCS | Mod: HCNC,ANES,, | Performed by: ANESTHESIOLOGY

## 2019-11-14 PROCEDURE — 37000009 HC ANESTHESIA EA ADD 15 MINS: Mod: HCNC | Performed by: UROLOGY

## 2019-11-14 PROCEDURE — 52224 CYSTOSCOPY AND TREATMENT: CPT | Mod: HCNC,,, | Performed by: UROLOGY

## 2019-11-14 PROCEDURE — 71000016 HC POSTOP RECOV ADDL HR: Mod: HCNC | Performed by: UROLOGY

## 2019-11-14 PROCEDURE — 25000003 PHARM REV CODE 250: Mod: HCNC | Performed by: ANESTHESIOLOGY

## 2019-11-14 PROCEDURE — 63600175 PHARM REV CODE 636 W HCPCS: Mod: HCNC | Performed by: NURSE ANESTHETIST, CERTIFIED REGISTERED

## 2019-11-14 PROCEDURE — 88305 TISSUE EXAM BY PATHOLOGIST: CPT | Mod: 59,HCNC | Performed by: PATHOLOGY

## 2019-11-14 PROCEDURE — 52224 PR CYSTOURETHROSCOPY,FULGUR <0.5 CM LESN: ICD-10-PCS | Mod: HCNC,,, | Performed by: UROLOGY

## 2019-11-14 PROCEDURE — 71000033 HC RECOVERY, INTIAL HOUR: Mod: HCNC | Performed by: UROLOGY

## 2019-11-14 PROCEDURE — 36000707: Mod: HCNC | Performed by: UROLOGY

## 2019-11-14 PROCEDURE — C1769 GUIDE WIRE: HCPCS | Mod: HCNC | Performed by: UROLOGY

## 2019-11-14 PROCEDURE — 36000706: Mod: HCNC | Performed by: UROLOGY

## 2019-11-14 PROCEDURE — 88305 TISSUE EXAM BY PATHOLOGIST: ICD-10-PCS | Mod: 26,HCNC,, | Performed by: PATHOLOGY

## 2019-11-14 PROCEDURE — D9220A PRA ANESTHESIA: Mod: HCNC,ANES,, | Performed by: ANESTHESIOLOGY

## 2019-11-14 PROCEDURE — 71000039 HC RECOVERY, EACH ADD'L HOUR: Mod: HCNC | Performed by: UROLOGY

## 2019-11-14 PROCEDURE — 99900103 DSU ONLY-NO CHARGE-INITIAL HR (STAT): Mod: HCNC | Performed by: UROLOGY

## 2019-11-14 PROCEDURE — 99900104 DSU ONLY-NO CHARGE-EA ADD'L HR (STAT): Mod: HCNC | Performed by: UROLOGY

## 2019-11-14 PROCEDURE — D9220A PRA ANESTHESIA: Mod: HCNC,CRNA,, | Performed by: NURSE ANESTHETIST, CERTIFIED REGISTERED

## 2019-11-14 PROCEDURE — 63600175 PHARM REV CODE 636 W HCPCS: Mod: HCNC | Performed by: ANESTHESIOLOGY

## 2019-11-14 PROCEDURE — 88305 TISSUE EXAM BY PATHOLOGIST: CPT | Mod: 26,HCNC,, | Performed by: PATHOLOGY

## 2019-11-14 PROCEDURE — 71000015 HC POSTOP RECOV 1ST HR: Mod: HCNC | Performed by: UROLOGY

## 2019-11-14 RX ORDER — SODIUM CHLORIDE, SODIUM LACTATE, POTASSIUM CHLORIDE, CALCIUM CHLORIDE 600; 310; 30; 20 MG/100ML; MG/100ML; MG/100ML; MG/100ML
500 INJECTION, SOLUTION INTRAVENOUS ONCE
Status: DISCONTINUED | OUTPATIENT
Start: 2019-11-14 | End: 2019-11-14 | Stop reason: HOSPADM

## 2019-11-14 RX ORDER — ONDANSETRON 2 MG/ML
INJECTION INTRAMUSCULAR; INTRAVENOUS
Status: DISCONTINUED | OUTPATIENT
Start: 2019-11-14 | End: 2019-11-14

## 2019-11-14 RX ORDER — LIDOCAINE HCL/PF 100 MG/5ML
SYRINGE (ML) INTRAVENOUS
Status: DISCONTINUED | OUTPATIENT
Start: 2019-11-14 | End: 2019-11-14

## 2019-11-14 RX ORDER — EPHEDRINE SULFATE 50 MG/ML
INJECTION, SOLUTION INTRAVENOUS
Status: DISCONTINUED | OUTPATIENT
Start: 2019-11-14 | End: 2019-11-14

## 2019-11-14 RX ORDER — DEXAMETHASONE SODIUM PHOSPHATE 4 MG/ML
INJECTION, SOLUTION INTRA-ARTICULAR; INTRALESIONAL; INTRAMUSCULAR; INTRAVENOUS; SOFT TISSUE
Status: DISCONTINUED | OUTPATIENT
Start: 2019-11-14 | End: 2019-11-14

## 2019-11-14 RX ORDER — ACETAMINOPHEN 10 MG/ML
INJECTION, SOLUTION INTRAVENOUS
Status: DISCONTINUED | OUTPATIENT
Start: 2019-11-14 | End: 2019-11-14

## 2019-11-14 RX ORDER — PROPOFOL 10 MG/ML
VIAL (ML) INTRAVENOUS
Status: DISCONTINUED | OUTPATIENT
Start: 2019-11-14 | End: 2019-11-14

## 2019-11-14 RX ORDER — FENTANYL CITRATE 50 UG/ML
INJECTION, SOLUTION INTRAMUSCULAR; INTRAVENOUS
Status: DISCONTINUED | OUTPATIENT
Start: 2019-11-14 | End: 2019-11-14

## 2019-11-14 RX ORDER — SODIUM CHLORIDE 0.9 % (FLUSH) 0.9 %
3 SYRINGE (ML) INJECTION EVERY 8 HOURS
Status: DISCONTINUED | OUTPATIENT
Start: 2019-11-14 | End: 2019-11-14 | Stop reason: HOSPADM

## 2019-11-14 RX ORDER — LIDOCAINE HYDROCHLORIDE 20 MG/ML
JELLY TOPICAL
Qty: 5 ML | Refills: 0 | Status: ON HOLD | OUTPATIENT
Start: 2019-11-14 | End: 2020-07-08 | Stop reason: HOSPADM

## 2019-11-14 RX ORDER — FENTANYL CITRATE 50 UG/ML
25 INJECTION, SOLUTION INTRAMUSCULAR; INTRAVENOUS EVERY 5 MIN PRN
Status: DISCONTINUED | OUTPATIENT
Start: 2019-11-14 | End: 2019-11-14 | Stop reason: HOSPADM

## 2019-11-14 RX ORDER — OXYCODONE HYDROCHLORIDE 5 MG/1
5 TABLET ORAL
Status: DISCONTINUED | OUTPATIENT
Start: 2019-11-14 | End: 2019-11-14 | Stop reason: HOSPADM

## 2019-11-14 RX ORDER — CIPROFLOXACIN 500 MG/1
500 TABLET ORAL 2 TIMES DAILY
Qty: 10 TABLET | Refills: 0 | Status: SHIPPED | OUTPATIENT
Start: 2019-11-14 | End: 2019-11-19

## 2019-11-14 RX ORDER — METOCLOPRAMIDE HYDROCHLORIDE 5 MG/ML
10 INJECTION INTRAMUSCULAR; INTRAVENOUS EVERY 10 MIN PRN
Status: DISCONTINUED | OUTPATIENT
Start: 2019-11-14 | End: 2019-11-14 | Stop reason: HOSPADM

## 2019-11-14 RX ORDER — SODIUM CHLORIDE, SODIUM LACTATE, POTASSIUM CHLORIDE, CALCIUM CHLORIDE 600; 310; 30; 20 MG/100ML; MG/100ML; MG/100ML; MG/100ML
10 INJECTION, SOLUTION INTRAVENOUS CONTINUOUS
Status: DISCONTINUED | OUTPATIENT
Start: 2019-11-14 | End: 2019-11-14 | Stop reason: HOSPADM

## 2019-11-14 RX ORDER — LIDOCAINE HYDROCHLORIDE 10 MG/ML
1 INJECTION, SOLUTION EPIDURAL; INFILTRATION; INTRACAUDAL; PERINEURAL ONCE
Status: DISCONTINUED | OUTPATIENT
Start: 2019-11-14 | End: 2019-11-14 | Stop reason: HOSPADM

## 2019-11-14 RX ORDER — CIPROFLOXACIN 2 MG/ML
400 INJECTION, SOLUTION INTRAVENOUS
Status: COMPLETED | OUTPATIENT
Start: 2019-11-14 | End: 2019-11-14

## 2019-11-14 RX ADMIN — EPHEDRINE SULFATE 10 MG: 50 INJECTION, SOLUTION INTRAMUSCULAR; INTRAVENOUS; SUBCUTANEOUS at 10:11

## 2019-11-14 RX ADMIN — ONDANSETRON 4 MG: 2 INJECTION, SOLUTION INTRAMUSCULAR; INTRAVENOUS at 10:11

## 2019-11-14 RX ADMIN — FENTANYL CITRATE 100 MCG: 50 INJECTION, SOLUTION INTRAMUSCULAR; INTRAVENOUS at 10:11

## 2019-11-14 RX ADMIN — SODIUM CHLORIDE, SODIUM LACTATE, POTASSIUM CHLORIDE, AND CALCIUM CHLORIDE 10 ML/HR: .6; .31; .03; .02 INJECTION, SOLUTION INTRAVENOUS at 08:11

## 2019-11-14 RX ADMIN — FENTANYL CITRATE 25 MCG: 0.05 INJECTION, SOLUTION INTRAMUSCULAR; INTRAVENOUS at 11:11

## 2019-11-14 RX ADMIN — OXYCODONE HYDROCHLORIDE 5 MG: 5 TABLET ORAL at 11:11

## 2019-11-14 RX ADMIN — ACETAMINOPHEN 1000 MG: 10 INJECTION, SOLUTION INTRAVENOUS at 10:11

## 2019-11-14 RX ADMIN — DEXAMETHASONE SODIUM PHOSPHATE 4 MG: 4 INJECTION, SOLUTION INTRAMUSCULAR; INTRAVENOUS at 10:11

## 2019-11-14 RX ADMIN — CIPROFLOXACIN 400 MG: 2 INJECTION INTRAVENOUS at 10:11

## 2019-11-14 RX ADMIN — PROPOFOL 200 MG: 10 INJECTION, EMULSION INTRAVENOUS at 10:11

## 2019-11-14 RX ADMIN — LIDOCAINE HYDROCHLORIDE 100 MG: 20 INJECTION, SOLUTION INTRAVENOUS at 10:11

## 2019-11-14 NOTE — PLAN OF CARE
Pt sitting up in bed silk leg removed from pt's leg to removed tension from gr catheter. Cathter placed in stat lock, and 20ml removed from catheter balloon.

## 2019-11-14 NOTE — ANESTHESIA POSTPROCEDURE EVALUATION
Anesthesia Post Evaluation    Patient: Edwin Sepulveda III    Procedure(s) Performed: Procedure(s) (LRB):  CYSTOSCOPY, WITH BLADDER BIOPSY, WITH FULGURATION IF INDICATED (N/A)    Final Anesthesia Type: general    Patient location during evaluation: PACU  Patient participation: Yes- Able to Participate  Level of consciousness: awake and alert  Post-procedure vital signs: reviewed and stable  Pain management: adequate  Airway patency: patent    PONV status at discharge: No PONV  Anesthetic complications: no      Cardiovascular status: blood pressure returned to baseline  Respiratory status: unassisted  Hydration status: euvolemic  Follow-up not needed.          Vitals Value Taken Time   /82 11/14/2019 11:53 AM   Temp 36.7 °C (98.1 °F) 11/14/2019 11:12 AM   Pulse 68 11/14/2019 11:56 AM   Resp 23 11/14/2019 11:56 AM   SpO2 98 % 11/14/2019 11:56 AM   Vitals shown include unvalidated device data.      No case tracking events are documented in the log.      Pain/Sanya Score: Pain Rating Prior to Med Admin: 5 (pt reports spasms) (11/14/2019 11:53 AM)  Sanya Score: 10 (11/14/2019 11:45 AM)

## 2019-11-14 NOTE — TRANSFER OF CARE
"Anesthesia Transfer of Care Note    Patient: Edwin Sepulveda III    Procedure(s) Performed: Procedure(s) (LRB):  CYSTOSCOPY, WITH BLADDER BIOPSY, WITH FULGURATION IF INDICATED (N/A)    Patient location: PACU    Anesthesia Type: general    Transport from OR: Transported from OR on 2-3 L/min O2 by NC with adequate spontaneous ventilation    Post pain: adequate analgesia    Post assessment: no apparent anesthetic complications    Post vital signs: stable    Level of consciousness: awake    Complications: none    Transfer of care protocol was followed      Last vitals:   Visit Vitals  /75 (BP Location: Left arm, Patient Position: Sitting)   Pulse 73   Temp 36.6 °C (97.9 °F) (Temporal)   Resp 18   Ht 5' 9" (1.753 m)   Wt 77.1 kg (170 lb)   SpO2 96%   BMI 25.10 kg/m²     "

## 2019-11-14 NOTE — ANESTHESIA PROCEDURE NOTES
Intubation  Performed by: Dolly Goff CRNA  Authorized by: Dolly Goff CRNA     Intubation:     Induction:  Intravenous    Intubated:  Postinduction    Mask Ventilation:  Easy mask    Attempts:  1    Attempted By:  CRNA    Difficult Airway Encountered?: No      Complications:  None    Airway Device:  Supraglottic airway/LMA    Airway Device Size:  4.0    Style/Cuff Inflation:  Cuffed (inflated to minimal occlusive pressure)    Secured at:  The lips    Placement Verified By:  Capnometry    Complicating Factors:  Retrognathia and small mouth (TMD 1 finger)    Findings Post-Intubation:  BS equal bilateral

## 2019-11-14 NOTE — PLAN OF CARE
Discharged home with spouse in no distress, all valuables were returned, IV removed, leg bag instructions provided with handouts. Patient and spouse stated full understanding of all instructions and signs and symptoms to report

## 2019-11-14 NOTE — BRIEF OP NOTE
[facility] Urology Brief Operative/Discharge Note    Date: 11/14/2019    Staff Surgeon: Kermit Beltran MD    Pre-Op Diagnosis:   Bladder lesions  History of bladder cancer    Post-Op Diagnosis: same    Procedure(s) Performed:   Cystoscopy with bladder biopsy/fulguration and ablation <0.5cm    Specimen(s): 1. Bladder neck, 2. Left lateral wall    Anesthesia: General LMA anesthesia    Findings:   gray frondular fibrinous debris at left lateral bladder wall site of previous tumor with calcifications protruding from bladder wall at site with significant surrounding hypervascularity. Moderate amount of same gray fibrinous material extending from left bladder neck with punctate calcification as well  - both areas biopsied and ablated  Also evidence of radiation prostatitis  No gross stricture as 21 scope and 22 gr passed with only slight resistance    Estimated Blood Loss: minimal    Drains: 22 Kiana gr    Complications: none    Disposition: pacu to home    Discharge home today status post uncomplicated procedure as above  Diet - resume home diet  Follow up: Mon 11/18/19 by 0900 for gr removal, then will call upon review of pathology to plan next steps (likely just plan next cystoscopy)  Instructions:   Limit strenuous activity while gr in place, pink/clear red (koolaid) ok as long as draining well with clear consistency and clears with hydration, drink a lot of water, use large bag at night, no aspirin/fish oil/vitamin E x 3-5 days. Complete 5 day course of cipro. Continue flomax until at least 5-7 days after gr removal. May see small drips of blood around gr in first few days  Meds:     Medication List      START taking these medications    ciprofloxacin HCl 500 MG tablet  Commonly known as:  CIPRO  Take 1 tablet (500 mg total) by mouth 2 (two) times daily. for 5 days     lidocaine HCL 2% 2 % jelly  Commonly known as:  XYLOCAINE  Apply topically as needed. For catheter irritation at tip of penis         CONTINUE taking these medications    FLOMAX 0.4 mg Cap  Generic drug:  tamsulosin           Where to Get Your Medications      These medications were sent to [facility] #[id number] - [place] - [address] AT [facility] [address]  [address] [address]    Phone:  [phone]   · ciprofloxacin HCl 500 MG tablet  · lidocaine HCL 2% 2 % jelly

## 2019-11-14 NOTE — DISCHARGE INSTRUCTIONS
"Discharge Instructions: After Your Surgery/Procedure  Youve just had surgery. During surgery you were given medicine called anesthesia to keep you relaxed and free of pain. After surgery you may have some pain or nausea. This is common. Here are some tips for feeling better and getting well after surgery.     Stay on schedule with your medication.   Going home  Your doctor or nurse will show you how to take care of yourself when you go home. He or she will also answer your questions. Have an adult family member or friend drive you home.      For your safety we recommend these precaution for the first 24 hours after your procedure:  · Do not drive or use heavy equipment.  · Do not make important decisions or sign legal papers.  · Do not drink alcohol.  · Have someone stay with you, if needed. He or she can watch for problems and help keep you safe.  · Your concentration, balance, coordination, and judgement may be impaired for many hours after anesthesia.  Use caution when ambulating or standing up.     · You may feel weak and "washed out" after anesthesia and surgery.      Subtle residual effects of general anesthesia or sedation with regional / local anesthesia can last more than 24 hours.  Rest for the remainder of the day or longer if your Doctor/Surgeon has advised you to do so.  Although you may feel normal within the first 24 hours, your reflexes and mental ability may be impaired without you realizing it.  You may feel dizzy, lightheaded or sleepy for 24 hours or longer.      Be sure to go to all follow-up visits with your doctor. And rest after your surgery for as long as your doctor tells you to.  Coping with pain  If you have pain after surgery, pain medicine will help you feel better. Take it as told, before pain becomes severe. Also, ask your doctor or pharmacist about other ways to control pain. This might be with heat, ice, or relaxation. And follow any other instructions your surgeon or nurse gives " you.  Tips for taking pain medicine  To get the best relief possible, remember these points:  · Pain medicines can upset your stomach. Taking them with a little food may help.  · Most pain relievers taken by mouth need at least 20 to 30 minutes to start to work.  · Taking medicine on a schedule can help you remember to take it. Try to time your medicine so that you can take it before starting an activity. This might be before you get dressed, go for a walk, or sit down for dinner.  · Constipation is a common side effect of pain medicines. Call your doctor before taking any medicines such as laxatives or stool softeners to help ease constipation. Also ask if you should skip any foods. Drinking lots of fluids and eating foods such as fruits and vegetables that are high in fiber can also help. Remember, do not take laxatives unless your surgeon has prescribed them.  · Drinking alcohol and taking pain medicine can cause dizziness and slow your breathing. It can even be deadly. Do not drink alcohol while taking pain medicine.  · Pain medicine can make you react more slowly to things. Do not drive or run machinery while taking pain medicine.  Your health care provider may tell you to take acetaminophen to help ease your pain. Ask him or her how much you are supposed to take each day. Acetaminophen or other pain relievers may interact with your prescription medicines or other over-the-counter (OTC) drugs. Some prescription medicines have acetaminophen and other ingredients. Using both prescription and OTC acetaminophen for pain can cause you to overdose. Read the labels on your OTC medicines with care. This will help you to clearly know the list of ingredients, how much to take, and any warnings. It may also help you not take too much acetaminophen. If you have questions or do not understand the information, ask your pharmacist or health care provider to explain it to you before you take the OTC medicine.  Managing  nausea  Some people have an upset stomach after surgery. This is often because of anesthesia, pain, or pain medicine, or the stress of surgery. These tips will help you handle nausea and eat healthy foods as you get better. If you were on a special food plan before surgery, ask your doctor if you should follow it while you get better. These tips may help:  · Do not push yourself to eat. Your body will tell you when to eat and how much.  · Start off with clear liquids and soup. They are easier to digest.  · Next try semi-solid foods, such as mashed potatoes, applesauce, and gelatin, as you feel ready.  · Slowly move to solid foods. Dont eat fatty, rich, or spicy foods at first.  · Do not force yourself to have 3 large meals a day. Instead eat smaller amounts more often.  · Take pain medicines with a small amount of solid food, such as crackers or toast, to avoid nausea.     Call your surgeon if  · You still have pain an hour after taking medicine. The medicine may not be strong enough.  · You feel too sleepy, dizzy, or groggy. The medicine may be too strong.  · You have side effects like nausea, vomiting, or skin changes, such as rash, itching, or hives.       If you have obstructive sleep apnea  You were given anesthesia medicine during surgery to keep you comfortable and free of pain. After surgery, you may have more apnea spells because of this medicine and other medicines you were given. The spells may last longer than usual.   At home:  · Keep using the continuous positive airway pressure (CPAP) device when you sleep. Unless your health care provider tells you not to, use it when you sleep, day or night. CPAP is a common device used to treat obstructive sleep apnea.  · Talk with your provider before taking any pain medicine, muscle relaxants, or sedatives. Your provider will tell you about the possible dangers of taking these medicines.  © 7337-2314 [employer]. [address] [place] [address]  [place] [address]. All rights reserved. This information is not intended as a substitute for professional medical care. Always follow your healthcare professional's instructions.    General Information:    1.  Do not drink alcoholic beverages including beer for 24 hours or as long as you are on pain medication..  2.  Do not drive a motor vehicle, operate machinery or power tools, or signs legal papers for 24 hours or as long as you are on pain medication.   3.  You may experience light-headedness, dizziness, and sleepiness following surgery. Please do not stay alone. A responsible adult should be with you for this 24 hour period.  4.  Go home and rest.    5. Progress slowly to a normal diet unless instructed.  Otherwise, begin with liquids such as soft drinks, then soup and crackers working up to solid foods. Drink plenty of nonalcoholic fluids.  6.  Certain anesthetics and pain medications produce nausea and vomiting in certain       individuals. If nausea becomes a problem at home, call you doctor.    7. A nurse will be calling you sometime after surgery. Do not be alarmed. This is our way of finding out how you are doing.    8. Several times every hour while you are awake, take 2-3 deep breaths and cough. If you had stomach surgery hold a pillow or rolled towel firmly against your stomach before you cough. This will help with any pain the cough might cause.  9. Several times every hour while you are awake, pump and flex your feet 5-6 times and do foot circles. This will help prevent blood clots.    10.Call your doctor for severe pain, bleeding, fever, or signs or symptoms of infection (pain, swelling, redness, foul odor, drainage).    Post op instructions for prevention of DVT  What is deep vein thrombosis?  Deep vein thrombosis (DVT) is the medical term for blood clots in the deep veins of the leg.  These blood clots can be dangerous.  A DVT can block a blood vessel and keep blood from getting where it needs to go.   Another problem is that the clot can travel to other parts of the body such as the lungs.  A clot that travels to the lungs is called a pulmonary embolus (PE) and can cause serious problems with breathing which can lead to death.  Am I at risk for DVT/PE?  If you are not very active, you are at risk of DVT.  Anyone confined to bed, sitting for long periods of time, recovering from surgery, etc. increases the risk of DVT.  Other risk factors are cancer diagnosis, certain medications, estrogen replacement in any form,older age, obesity, pregnancy, smoking, history of clotting disorders, and dehydration.  How will I know if I have a DVT?   Swelling in the lower leg   Pain   Warmth, redness, hardness or bulging of the vein  If you have any of these symptoms, call your doctors office right away.  Some people will not have any symptoms until the clot moves to the lungs.  What are the symptoms of a PE?   Panting, shortness of breath, or trouble breathing   Sharp, knife-like chest pain when you breathe   Coughing or coughing up blood   Rapid heartbeat  If you have any of these symptoms or get worse quickly, call 911 for emergency treatment.  How can I prevent a DVT?   Avoid long periods of inactivity and dont cross your legs--get up and walk around every hour or so.   Stay active--walking after surgery is highly encouraged.  This means you should get out of the house and walk in the neighborhood.  Going up and down stairs will not impair healing (unless advised against such activity by your doctor).     Drink plenty of noncaffeinated, nonalcoholic fluids each day to prevent dehydration.   Wear special support stockings, if they have been advised by your doctor.   If you travel, stop at least once an hour and walk around.   Avoid smoking (assistance with stopping is available through your healthcare provider)  Always notify your doctor if you are not able to follow the post operative instructions that are  given to you at the time of discharge.  It may be necessary to prescribe one of the medications available to prevent DVT.    We hope your stay was comfortable as you heal now, mend and rest.    For we have enjoyed taking care of you by giving your our best.    And as you get better, by regaining your health and strength;   We count it as a privilege to have served you and hope your time at [facility] was well spent.      Thank  You!!!

## 2019-11-14 NOTE — ANESTHESIA PREPROCEDURE EVALUATION
11/14/2019  Edwin Sepulveda III is a 64 y.o., male.    Pre-op Assessment    I have reviewed the Patient Summary Reports.     I have reviewed the Nursing Notes.   I have reviewed the Medications.     Review of Systems  Anesthesia Hx:  No problems with previous Anesthesia  Denies Family Hx of Anesthesia complications.   Denies Personal Hx of Anesthesia complications.   Social:  Non-Smoker    Hematology/Oncology:  Hematology Normal      Current/Recent Cancer. Oncology Comments: Bladder CA    Cardiovascular:   Hypertension Denies MI.  Denies CAD.    Denies CABG/stent.  Denies Dysrhythmias.   Denies Angina.         ECG has been reviewed.    Renal/:   Chronic Renal Disease Elevated PSA   Hepatic/GI:   GERD, well controlled    Musculoskeletal:  Musculoskeletal Normal    Neurological:  Neurology Normal    Endocrine:  Endocrine Normal    Psych:  Psychiatric Normal           Physical Exam  General:  Well nourished    Airway/Jaw/Neck:  Airway Findings: Mouth Opening: Normal Tongue: Normal  General Airway Assessment: Adult, Possible difficult intubation  Mallampati: III  Improves to II with phonation.  TM Distance: < 4 cm  Jaw/Neck Findings:  Neck ROM: Normal ROM      Dental:  Dental Findings: In tact   Chest/Lungs:  Chest/Lungs Findings: Clear to auscultation, Normal Respiratory Rate     Heart/Vascular:  Heart Findings: Rate: Normal  Rhythm: Regular Rhythm  Sounds: Normal  Heart murmur: negative       Mental Status:  Mental Status Findings:  Cooperative, Alert and Oriented         Anesthesia Plan  Type of Anesthesia, risks & benefits discussed:  Anesthesia Type:  general  Patient's Preference:   Intra-op Monitoring Plan: standard ASA monitors  Intra-op Monitoring Plan Comments:   Post Op Pain Control Plan:   Post Op Pain Control Plan Comments:   Induction:   IV  Beta Blocker:  Patient is not currently on a Beta-Blocker  (No further documentation required).       Informed Consent: Patient understands risks and agrees with Anesthesia plan.  Questions answered.   ASA Score: 2     Day of Surgery Review of History & Physical:    H&P update referred to the surgeon.         Ready For Surgery From Anesthesia Perspective.

## 2019-11-14 NOTE — INTERVAL H&P NOTE
The patient has been examined and the H&P has been reviewed:    Findings:   Mild recurrence of stricture just distal to the sphincter which was passable with flexible cystoscope with minimal resistance. Recurrent mild gray frondular fibrinous debris at left lateral bladder wall site of previous tumor with calcifications protruding from bladder wall at site with significant surrounding hypervascularity. Moderate amount of same gray fibrinous material extending from left bladder neck with punctate calcification as well, seen on retroflexion to be partially occluding bladder neck, though seen direct to be less burden from within urethra    Disposition:   Given his bladder findings, discussed need to remove this obstructing tissue from near the bladder neck as well as abnormal tissue from prior resection site with biopsies.  Given burden at both bladder neck as well as left lateral bladder wall and concern for recurrence of stricture, advised performing this in the operating room.  Would attempt to avoid resection and resectoscope if at all possible given his urethral narrowing as well as this reactive change from prior resection and cautery, and to minimize the risk of any future incontinence worsening given his history of radiation.     All risks and benefits of bladder biopsy fulguration and possible urethral dilation were discussed with the patient.  This has been plan for the operating room on 11/14/19, and he will go get his preop labs today and urine culture was sent today.  Can phone preop as has had surgery in the last few months.  Given his chronic cystitis changes on pathology at last resection, and hypervascularity in similar findings found today, advised a low-dose daily suppressive course of antibiotics for 2 weeks up until the point of resection and once daily Bactrim has been prescribed to avoid long courses of fluoroquinolones.       Anesthesia/Surgery risks, benefits and alternative options discussed  and understood by patient/family.          Active Hospital Problems    Diagnosis  POA    History of bladder cancer [Z85.51]  Not Applicable      Resolved Hospital Problems   No resolved problems to display.

## 2019-11-15 VITALS
HEART RATE: 6 BPM | WEIGHT: 170 LBS | RESPIRATION RATE: 20 BRPM | BODY MASS INDEX: 25.18 KG/M2 | SYSTOLIC BLOOD PRESSURE: 152 MMHG | OXYGEN SATURATION: 96 % | HEIGHT: 69 IN | TEMPERATURE: 98 F | DIASTOLIC BLOOD PRESSURE: 77 MMHG

## 2019-11-15 NOTE — OP NOTE
[facility] Urology Operative Report     Date: 11/14/2019     Staff Surgeon: Kermit Beltran MD     Pre-Op Diagnosis:   Bladder lesions  History of bladder cancer     Post-Op Diagnosis: same     Procedure(s) Performed:   Cystoscopy with bladder biopsy/fulguration and ablation <0.5cm     Specimen(s): 1. Bladder neck, 2. Left lateral wall     Anesthesia: General LMA anesthesia     Findings:   gray frondular fibrinous debris at left lateral bladder wall site of previous tumor with calcifications protruding from bladder wall at site with significant surrounding hypervascularity. Moderate amount of same gray fibrinous material extending from left bladder neck with punctate calcification as well  - both areas biopsied and ablated  Also evidence of radiation prostatitis  No gross stricture as 21 scope and 22 gr passed with only slight resistance     Estimated Blood Loss: minimal     Drains: 22 [place] gr     Complications: none    Indications for procedure:  64-year-old man with history of external beam radiation for prostate cancer in 2014, history of kidney stones, and most recently diagnosed with a bladder tumor in January 2019 after presenting with gross hematuria.  Cystoscopy in February 2019 by Dr. Santos revealed patent urethra the radiation changes of the bladder neck and 4 cm aggregate of typical TCC lesions in the left bladder wall, and was therefore taken for TURBT on 3/13/19 of which only a portion was resected and the remainder was fulgurated, and pathology revealed low-grade papillary urothelial carcinoma, noninvasive.  He did experience brief urinary retention after Gr catheter was removed on postop day 2, and then presented on 5/13/19 for catheterization and mitomycin-C instillation at [facility] during which there was difficult catheterization and subsequent urinary retention for which he ultimately passed a fill and pull voiding trial after using Flomax which he has since  discontinued. Cystoscopic evaluation 7/9/19 revealed narrow bulbar urethral stricture just distal to the sphincter which was able to passively dilate with flexible cystoscope and a 3 cm bladder tumor/growth from left lateral bladder wall at site of previous known tumor appearing more granulomatous, Gray, and frondular in appearance rather than classic papillary appearance. He presented 7/25/19 for TURBT with resection of this mention area of hypervascularity with frondular fibrin as tissue as well as erythema and hypervascularity extending on to the bladder neck and prostatic urethra.  Urethral stricture was dilated from 18-28 Malay to pass resectoscope.  Pathology revealed extensive necrosis with acute inflammation and dystrophic calcification consistent with previous resection site as well as chronic follicular cystitis and mild reactive atypia consistent with radiation change.  At most recent evaluation he had been off Flomax with AUA symptom score 10/3 and postvoid residual of 41 cc.  He reports in the interim his bladder capacity seems to be about 200-300 cc, and he has very rare incontinence but has had no interim blood in the urine. Cystoscopic surveillance 10/29/19 found minimal concern for structure recurrence passable with cystoscope and abnormalities of bladder neck and left lateral wall at prior resection site for which he presents for formal biopsy.    Procedure in detail:  After appropriate informed consent was obtained, the patient was taken to the operating room and placed in the lithotomy position. He was prepped and draped in standard sterile fashion and preoperative antibiotics were administered. A WHO approved time-out was performed.    A rigid Olympus cystoscope in a 21 Malay sheath was passed into the bladder.  Anterior and posterior urethra largely normal, without gross stricture recurrence of which 21 Malay scope passed easily without resistance.  The prostatic urethra was noted to be  somewhat blanched with some hypervascularity consistent with radiation prostatitis.    At the 5 o'clock position on the left inferior lateral bladder neck there was Gray frondular fibrinous debris emanating from it with some surrounding hypervascularity, and on the left lateral wall posterior lateral to the trigone at site of previous tumor resection was mounded hypervascular mucosa with calcifications and similar Gray frondular tissue.    Cold cup biopsy forceps was used to take excisional biopsies of these abnormal tissue areas, sending separate specimens from the bladder neck and from the left lateral wall.  Bugbee monopolar electrocautery was used not only to fulgurate for hemostasis but also to ablate the abnormal surrounding mucosa and hypervascularity.  Care was taken on the left lateral wall lesion to stay at least 2 cm away from the ureteral orifice which was seen to have clear efflux after ablating abnormal mucosa in tissue.  There was some anterior prostatic urethral bleeding near the bladder neck from scope manipulation which was also gently fulgurated, after which the prostatic urethra and urethra were reviewed with water irrigation flow off and found to be hemostatic without bloody efflux.  The bladder was drained and via the cystoscope sheath a superstiff guidewire was passed through and the sheath off-loaded.  A 22 [place] tip De Leon catheter was passed over the guidewire into the bladder with minimal resistance and the balloon was inflated with 30 cc in a 10 cc balloon.  The De Leon catheter was placed to gravity drainage and taped to the thigh on traction with Mastisol and silk tape.  60 cc catheter tip syringe was used to irrigate the bladder which was clear.    The patient tolerated the procedure well, there were no complications.  He was awakened and taken to PACU without incident     Disposition:  home today status post uncomplicated procedure as above.  Given the fulguration near bladder  neck, De Leon catheter traction will remain in place for 30 min postoperatively after which 20 cc will be removed from the balloon leaving 10 cc in place, and placed to a StatLock off of tension.  He will be discharged home with a postoperative course of antibiotics and return for De Leon catheter removal on Monday 11/18/19.  Will call the patient when pathology is available.  Most suspicious of continued radiation change as per previous biopsy but need to rule out any recurrence.  Will likely call to plan further surveillance cystoscopy.  If pathology is positive will make further follow-up plans on review.

## 2019-11-17 LAB
FINAL PATHOLOGIC DIAGNOSIS: NORMAL
GROSS: NORMAL

## 2019-11-18 ENCOUNTER — PATIENT MESSAGE (OUTPATIENT)
Dept: UROLOGY | Facility: CLINIC | Age: 64
End: 2019-11-18

## 2019-11-18 ENCOUNTER — CLINICAL SUPPORT (OUTPATIENT)
Dept: UROLOGY | Facility: CLINIC | Age: 64
End: 2019-11-18
Payer: MEDICARE

## 2019-11-18 DIAGNOSIS — Z85.51 HISTORY OF BLADDER CANCER: Primary | ICD-10-CM

## 2019-11-18 DIAGNOSIS — C67.2 MALIGNANT NEOPLASM OF LATERAL WALL OF BLADDER: Primary | ICD-10-CM

## 2019-11-18 RX ORDER — LIDOCAINE HYDROCHLORIDE 20 MG/ML
JELLY TOPICAL ONCE
Status: CANCELLED | OUTPATIENT
Start: 2019-11-18 | End: 2019-11-18

## 2019-11-18 NOTE — PROGRESS NOTES
Patient here today to have his catheter removed post TURBT.      6ml saline removed from catheter balloon.   Catheter removed with no difficulty.   Leg bag contains 25ml of clear/yellow urine.   Patient instructed to drink 6-8 eight oz glasses on non-cafeinated beverages.  If unable to urinate by 2:30 this afternoon, come back to the clinic to have catheter replaced.

## 2020-03-05 ENCOUNTER — PATIENT MESSAGE (OUTPATIENT)
Dept: SURGERY | Facility: AMBULARY SURGERY CENTER | Age: 65
End: 2020-03-05

## 2020-03-09 ENCOUNTER — TELEPHONE (OUTPATIENT)
Dept: UROLOGY | Facility: CLINIC | Age: 65
End: 2020-03-09

## 2020-03-09 NOTE — TELEPHONE ENCOUNTER
----- Message from Raf HEMALATHA Frisard sent at 3/9/2020 10:13 AM CDT -----  Contact: same  Patient called in and stated he has a nurse visit appointment tomorrow Tuesday 3/10/2020 at 9am but didn't know he had to come in.  Patient stated he thought he was just going to lab to have a PSA done prior to his procedure?    Patient stated he needs a call back at [phone]

## 2020-03-09 NOTE — TELEPHONE ENCOUNTER
Spoke w pt informed nurse visit tomm consist of Dr Beltran ordered urine cytology prior to repeat cystoscopy pt voiced ok

## 2020-03-10 ENCOUNTER — TELEPHONE (OUTPATIENT)
Dept: UROLOGY | Facility: CLINIC | Age: 65
End: 2020-03-10

## 2020-03-10 ENCOUNTER — CLINICAL SUPPORT (OUTPATIENT)
Dept: UROLOGY | Facility: CLINIC | Age: 65
End: 2020-03-10
Payer: MEDICARE

## 2020-03-10 DIAGNOSIS — Z85.51 HISTORY OF BLADDER CANCER: Primary | ICD-10-CM

## 2020-03-10 PROCEDURE — 88112 CYTOPATH CELL ENHANCE TECH: CPT | Mod: 26,HCNC,, | Performed by: PATHOLOGY

## 2020-03-10 PROCEDURE — 88112 PR  CYTOPATH, CELL ENHANCE TECH: ICD-10-PCS | Mod: 26,HCNC,, | Performed by: PATHOLOGY

## 2020-03-10 PROCEDURE — 88112 CYTOPATH CELL ENHANCE TECH: CPT | Mod: HCNC | Performed by: PATHOLOGY

## 2020-03-10 NOTE — TELEPHONE ENCOUNTER
Patient here today to provide urine for cytology.  Scheduled for cysto 3/17.  Patient says that one of the last cysto, he had problems and had to take Flomax 2 days prior.  He says he only has 5 Flomax.  Would like refill sent to pharmacy.   Also, last psa was 10/22. Thinks he is supposed to repeat every 4-6 months.  If this is correct, please place orders.

## 2020-03-11 NOTE — TELEPHONE ENCOUNTER
See pt portal message  Has 5 flomax - all he needs and will start 2d prior. Also noted he has cipro.  PSA q6 so will order/plan at time of cysto

## 2020-03-12 LAB — FINAL PATHOLOGIC DIAGNOSIS: NORMAL

## 2020-03-13 ENCOUNTER — TELEPHONE (OUTPATIENT)
Dept: UROLOGY | Facility: CLINIC | Age: 65
End: 2020-03-13

## 2020-03-13 NOTE — TELEPHONE ENCOUNTER
----- Message from Sergio Raza sent at 3/13/2020  2:49 PM CDT -----  Contact: pt  Type: Needs Medical Advice    Who Called:  pt    Best Call Back Number: [phone]  Additional Information: Would like to know if his procedure is still going to happen next week due to the COVID-19. Please call to advise.

## 2020-03-13 NOTE — TELEPHONE ENCOUNTER
Spoke with patient informed him of recommendations. Patient verbally voiced understanding and states he will continue with procedure on 3/17

## 2020-03-13 NOTE — TELEPHONE ENCOUNTER
Spoke with patient he wants to know if cystoscopy is still a go on 3/17 with . He states he is fairly healthy but his only concern is the last time he needed a catheter afterwards and if he needs one this time does not want to go to ER. Please advise

## 2020-03-13 NOTE — TELEPHONE ENCOUNTER
Up to patient  He has his supply of flomax and plans to start 48h prior.  Could always place gr at time and have him remove 24h later  Can also defer until covid settles out as no significant urgency

## 2020-03-16 ENCOUNTER — TELEPHONE (OUTPATIENT)
Dept: UROLOGY | Facility: CLINIC | Age: 65
End: 2020-03-16

## 2020-03-16 ENCOUNTER — PATIENT MESSAGE (OUTPATIENT)
Dept: UROLOGY | Facility: CLINIC | Age: 65
End: 2020-03-16

## 2020-03-16 NOTE — TELEPHONE ENCOUNTER
----- Message from Joao Leary sent at 3/16/2020  3:57 PM CDT -----  Contact: self   Patient want to know why his procedure was rescheduled please call back at [phone]    Case number [id number]

## 2020-03-16 NOTE — DISCHARGE INSTRUCTIONS
After the procedure    · Drink plenty of fluids.  · You may have burning or light bleeding when you urinate--this is normal.  · Medications may be prescribed to ease any discomfort or prevent infection. Take these as directed.  · Call your doctor if you have heavy bleeding or blood clots, burning that lasts more than a day, a fever over 100°F  (38° C), or trouble urinating.    After Surgery:  Always be aware that any surgery can cause these symptoms:    Pain- Medication can be prescribed for pain to decrease your pain but may not completely take your pain away.  Over the Counter pain medicine my be enough and you can always use Ice and rest to help ease pain.    Bleeding- a little bleeding after a surgery is usually within normal.  If there is a lot of blood you need to notify your MD.  Emergency treatments of bleeding are cold application, elevation of the bleeding site and compression.    Infection- Infection after surgery is NOT a normal occurrence.  Signs of infection are fever, swelling, hot to touch the incision.  If this occurs notify your MD immediately.    Nausea- this can be common after a surgery especially if you have had anesthesia medicine or are taking pain medicine.  Staying on clear liquids, bland foods, gingerale, or over the counter anti nausea medicines can help.  If you vomit more than once, notify your MD.  Anti Nausea medicines can be prescribed.

## 2020-03-29 ENCOUNTER — PATIENT MESSAGE (OUTPATIENT)
Dept: UROLOGY | Facility: CLINIC | Age: 65
End: 2020-03-29

## 2020-04-08 ENCOUNTER — PATIENT MESSAGE (OUTPATIENT)
Dept: UROLOGY | Facility: CLINIC | Age: 65
End: 2020-04-08

## 2020-05-07 DIAGNOSIS — Z85.51 HISTORY OF BLADDER CANCER: Primary | ICD-10-CM

## 2020-05-09 ENCOUNTER — PATIENT MESSAGE (OUTPATIENT)
Dept: SURGERY | Facility: AMBULARY SURGERY CENTER | Age: 65
End: 2020-05-09

## 2020-06-06 ENCOUNTER — LAB VISIT (OUTPATIENT)
Dept: PRIMARY CARE CLINIC | Facility: CLINIC | Age: 65
End: 2020-06-06
Payer: MEDICARE

## 2020-06-06 DIAGNOSIS — Z85.51 HISTORY OF BLADDER CANCER: ICD-10-CM

## 2020-06-06 PROCEDURE — U0003 INFECTIOUS AGENT DETECTION BY NUCLEIC ACID (DNA OR RNA); SEVERE ACUTE RESPIRATORY SYNDROME CORONAVIRUS 2 (SARS-COV-2) (CORONAVIRUS DISEASE [COVID-19]), AMPLIFIED PROBE TECHNIQUE, MAKING USE OF HIGH THROUGHPUT TECHNOLOGIES AS DESCRIBED BY CMS-2020-01-R: HCPCS | Mod: HCNC

## 2020-06-07 LAB — SARS-COV-2 RNA RESP QL NAA+PROBE: NOT DETECTED

## 2020-06-08 ENCOUNTER — PATIENT MESSAGE (OUTPATIENT)
Dept: SURGERY | Facility: AMBULARY SURGERY CENTER | Age: 65
End: 2020-06-08

## 2020-06-10 ENCOUNTER — PATIENT MESSAGE (OUTPATIENT)
Dept: UROLOGY | Facility: CLINIC | Age: 65
End: 2020-06-10

## 2020-07-02 DIAGNOSIS — Z01.818 PRE-OP TESTING: ICD-10-CM

## 2020-07-03 ENCOUNTER — HOSPITAL ENCOUNTER (EMERGENCY)
Facility: HOSPITAL | Age: 65
Discharge: HOME | End: 2020-07-03
Attending: EMERGENCY MEDICINE
Payer: MEDICARE

## 2020-07-03 ENCOUNTER — PATIENT MESSAGE (OUTPATIENT)
Dept: SURGERY | Facility: AMBULARY SURGERY CENTER | Age: 65
End: 2020-07-03

## 2020-07-03 VITALS
BODY MASS INDEX: 23.7 KG/M2 | SYSTOLIC BLOOD PRESSURE: 120 MMHG | RESPIRATION RATE: 18 BRPM | TEMPERATURE: 98 F | OXYGEN SATURATION: 97 % | DIASTOLIC BLOOD PRESSURE: 70 MMHG | WEIGHT: 160 LBS | HEIGHT: 69 IN | HEART RATE: 72 BPM

## 2020-07-03 DIAGNOSIS — N20.0 NEPHROLITHIASIS: Primary | ICD-10-CM

## 2020-07-03 LAB
ALBUMIN SERPL BCP-MCNC: 4.5 G/DL (ref 3.5–5.2)
ALP SERPL-CCNC: 68 U/L (ref 55–135)
ALT SERPL W/O P-5'-P-CCNC: 17 U/L (ref 10–44)
ANION GAP SERPL CALC-SCNC: 11 MMOL/L (ref 8–16)
AST SERPL-CCNC: 20 U/L (ref 10–40)
BACTERIA #/AREA URNS HPF: NEGATIVE /HPF
BASOPHILS # BLD AUTO: 0.04 K/UL (ref 0–0.2)
BASOPHILS NFR BLD: 0.6 % (ref 0–1.9)
BILIRUB SERPL-MCNC: 1.1 MG/DL (ref 0.1–1)
BILIRUB UR QL STRIP: NEGATIVE
BUN SERPL-MCNC: 25 MG/DL (ref 8–23)
CALCIUM SERPL-MCNC: 9.2 MG/DL (ref 8.7–10.5)
CHLORIDE SERPL-SCNC: 104 MMOL/L (ref 95–110)
CLARITY UR: ABNORMAL
CO2 SERPL-SCNC: 23 MMOL/L (ref 23–29)
COLOR UR: YELLOW
CREAT SERPL-MCNC: 1.1 MG/DL (ref 0.5–1.4)
DIFFERENTIAL METHOD: ABNORMAL
EOSINOPHIL # BLD AUTO: 0 K/UL (ref 0–0.5)
EOSINOPHIL NFR BLD: 0.3 % (ref 0–8)
ERYTHROCYTE [DISTWIDTH] IN BLOOD BY AUTOMATED COUNT: 13 % (ref 11.5–14.5)
EST. GFR  (AFRICAN AMERICAN): >60 ML/MIN/1.73 M^2
EST. GFR  (NON AFRICAN AMERICAN): >60 ML/MIN/1.73 M^2
GLUCOSE SERPL-MCNC: 134 MG/DL (ref 70–110)
GLUCOSE UR QL STRIP: NEGATIVE
HCT VFR BLD AUTO: 42.7 % (ref 40–54)
HGB BLD-MCNC: 14.1 G/DL (ref 14–18)
HGB UR QL STRIP: ABNORMAL
HYALINE CASTS #/AREA URNS LPF: 7 /LPF
IMM GRANULOCYTES # BLD AUTO: 0.03 K/UL (ref 0–0.04)
IMM GRANULOCYTES NFR BLD AUTO: 0.4 % (ref 0–0.5)
KETONES UR QL STRIP: ABNORMAL
LEUKOCYTE ESTERASE UR QL STRIP: NEGATIVE
LIPASE SERPL-CCNC: 69 U/L (ref 4–60)
LYMPHOCYTES # BLD AUTO: 0.5 K/UL (ref 1–4.8)
LYMPHOCYTES NFR BLD: 6.3 % (ref 18–48)
MCH RBC QN AUTO: 30.3 PG (ref 27–31)
MCHC RBC AUTO-ENTMCNC: 33 G/DL (ref 32–36)
MCV RBC AUTO: 92 FL (ref 82–98)
MICROSCOPIC COMMENT: ABNORMAL
MONOCYTES # BLD AUTO: 0.4 K/UL (ref 0.3–1)
MONOCYTES NFR BLD: 5.9 % (ref 4–15)
NEUTROPHILS # BLD AUTO: 6.1 K/UL (ref 1.8–7.7)
NEUTROPHILS NFR BLD: 86.5 % (ref 38–73)
NITRITE UR QL STRIP: NEGATIVE
NRBC BLD-RTO: 0 /100 WBC
PH UR STRIP: 6 [PH] (ref 5–8)
PLATELET # BLD AUTO: 152 K/UL (ref 150–350)
PMV BLD AUTO: 10.5 FL (ref 9.2–12.9)
POTASSIUM SERPL-SCNC: 3.8 MMOL/L (ref 3.5–5.1)
PROT SERPL-MCNC: 7.3 G/DL (ref 6–8.4)
PROT UR QL STRIP: NEGATIVE
RBC # BLD AUTO: 4.66 M/UL (ref 4.6–6.2)
RBC #/AREA URNS HPF: >100 /HPF (ref 0–4)
SODIUM SERPL-SCNC: 138 MMOL/L (ref 136–145)
SP GR UR STRIP: >=1.03 (ref 1–1.03)
SQUAMOUS #/AREA URNS HPF: 1 /HPF
URN SPEC COLLECT METH UR: ABNORMAL
UROBILINOGEN UR STRIP-ACNC: NEGATIVE EU/DL
WBC # BLD AUTO: 7.1 K/UL (ref 3.9–12.7)
WBC #/AREA URNS HPF: 4 /HPF (ref 0–5)

## 2020-07-03 PROCEDURE — 96361 HYDRATE IV INFUSION ADD-ON: CPT | Mod: GZ

## 2020-07-03 PROCEDURE — 25000003 PHARM REV CODE 250: Performed by: STUDENT IN AN ORGANIZED HEALTH CARE EDUCATION/TRAINING PROGRAM

## 2020-07-03 PROCEDURE — 81001 URINALYSIS AUTO W/SCOPE: CPT

## 2020-07-03 PROCEDURE — 80053 COMPREHEN METABOLIC PANEL: CPT

## 2020-07-03 PROCEDURE — 85025 COMPLETE CBC W/AUTO DIFF WBC: CPT

## 2020-07-03 PROCEDURE — 99285 EMERGENCY DEPT VISIT HI MDM: CPT | Mod: 25

## 2020-07-03 PROCEDURE — 96360 HYDRATION IV INFUSION INIT: CPT

## 2020-07-03 PROCEDURE — 83690 ASSAY OF LIPASE: CPT

## 2020-07-03 RX ORDER — HYDROCODONE BITARTRATE AND ACETAMINOPHEN 5; 325 MG/1; MG/1
1 TABLET ORAL EVERY 4 HOURS PRN
Qty: 11 TABLET | Refills: 0 | Status: SHIPPED | OUTPATIENT
Start: 2020-07-03 | End: 2021-01-23 | Stop reason: SDUPTHER

## 2020-07-03 RX ORDER — KETOROLAC TROMETHAMINE 30 MG/ML
10 INJECTION, SOLUTION INTRAMUSCULAR; INTRAVENOUS
Status: DISCONTINUED | OUTPATIENT
Start: 2020-07-03 | End: 2020-07-03

## 2020-07-03 RX ORDER — MORPHINE SULFATE 4 MG/ML
4 INJECTION, SOLUTION INTRAMUSCULAR; INTRAVENOUS
Status: DISCONTINUED | OUTPATIENT
Start: 2020-07-03 | End: 2020-07-03 | Stop reason: HOSPADM

## 2020-07-03 RX ORDER — ONDANSETRON 4 MG/1
4 TABLET, FILM COATED ORAL EVERY 6 HOURS
Qty: 12 TABLET | Refills: 0 | Status: SHIPPED | OUTPATIENT
Start: 2020-07-03 | End: 2021-01-23 | Stop reason: SDUPTHER

## 2020-07-03 RX ADMIN — SODIUM CHLORIDE 1000 ML: 9 INJECTION, SOLUTION INTRAVENOUS at 12:07

## 2020-07-03 NOTE — ED PROVIDER NOTES
Encounter Date: 7/3/2020       History     Chief Complaint   Patient presents with    Flank Pain     right side     66 y/o M with a PMHx of renal calculi as well as bladder Ca who presents to the ER with C.C of RLQ/ Right flank pain that started yesterday around 1pm. Pt states that he was picking up a chair when he developed RLQ pain that eventually approved and migrated to his R flank. JPTA he had sharp 10/10 right flank pain. He denies any hematuria or N/V.         Review of patient's allergies indicates:   Allergen Reactions    Penicillins Rash     Other reaction(s): Rash     Past Medical History:   Diagnosis Date    Family history of malignant neoplasm of prostate 8/14/2011    Gastroesophageal reflux disease with hiatal hernia     History of prostate cancer 1/7/2016    Hyperlipidemia     Kidney stones     Multinodular goiter     no medications    Prostate cancer 10/20/2014    Prostate disorder     Dr Satnos      Past Surgical History:   Procedure Laterality Date    APPENDECTOMY      BLADDER FULGURATION Left 3/13/2019    Procedure: FULGURATION, BLADDER;  Surgeon: Adin Santos MD;  Location: [facility] OR;  Service: Urology;  Laterality: Left;  tumor    COLONOSCOPY  3/26/2008    5-10 year recheck per Dr. Borja (normal exam)    CYSTOSCOPY N/A 7/9/2019    Procedure: CYSTOSCOPY;  Surgeon: Kermit Beltran MD;  Location: [facility] OR;  Service: Urology;  Laterality: N/A;    CYSTOSCOPY N/A 10/29/2019    Procedure: CYSTOSCOPY;  Surgeon: Kermit Beltran MD;  Location: [facility] OR;  Service: Urology;  Laterality: N/A;  text pt if he doesnt answer.  Collect urine    CYSTOSCOPY WITH BIOPSY OF BLADDER N/A 11/14/2019    Procedure: CYSTOSCOPY, WITH BLADDER BIOPSY, WITH FULGURATION IF INDICATED;  Surgeon: Kermit Beltran MD;  Location: [facility] OR;  Service: Urology;  Laterality: N/A;    DILATION OF URETHRA N/A 7/25/2019    Procedure: DILATION, URETHRA;  Surgeon: Kermit Beltran MD;  Location: [facility] OR;   Service: Urology;  Laterality: N/A;    esphogus scope      FLEXIBLE CYSTOSCOPY N/A 2019    Procedure: CYSTOSCOPY, FLEXIBLE;  Surgeon: Adin Santos MD;  Location: [facility] OR;  Service: Urology;  Laterality: N/A;    leg repair Left     ORCHIECTOMY      spermatocele repair       Family History   Problem Relation Age of Onset    Hypertension Mother     Stroke Mother 94    COPD Father     Cancer Father         prostate    Hypertension Sister     Diabetes Sister     Hyperlipidemia Sister     Cancer Maternal Grandmother     Cancer Paternal Grandfather         prostate     Social History     Tobacco Use    Smoking status: Former Smoker     Quit date: 1976     Years since quittin.4    Smokeless tobacco: Never Used   Substance Use Topics    Alcohol use: Yes     Alcohol/week: 3.0 standard drinks     Types: 3 Glasses of wine per week    Drug use: No     Review of Systems   Constitutional: Negative for chills and fever.   HENT: Negative for nosebleeds and sore throat.    Eyes: Negative for pain and discharge.   Respiratory: Negative for choking, shortness of breath and wheezing.    Cardiovascular: Negative for chest pain and palpitations.   Gastrointestinal: Positive for abdominal pain. Negative for abdominal distention, nausea and vomiting.   Endocrine: Negative for polyuria.   Genitourinary: Positive for flank pain. Negative for dysuria, frequency, hematuria, testicular pain and urgency.   Musculoskeletal: Negative for back pain.   Skin: Negative for rash and wound.   Neurological: Negative for dizziness, weakness, light-headedness, numbness and headaches.   Hematological: Does not bruise/bleed easily.       Physical Exam     Initial Vitals [20 0023]   BP Pulse Resp Temp SpO2   (!) 183/94 62 18 97.9 °F (36.6 °C) 98 %      MAP       --         Physical Exam    Nursing note and vitals reviewed.  Constitutional: He appears well-developed and well-nourished. No distress.   HENT:   Head:  Normocephalic and atraumatic.   Mouth/Throat: Oropharynx is clear and moist. No oropharyngeal exudate.   Eyes: Pupils are equal, round, and reactive to light. Right eye exhibits no discharge. Left eye exhibits no discharge.   Neck: Normal range of motion. No tracheal deviation present. No JVD present.   Cardiovascular: Normal rate, regular rhythm and normal heart sounds. Exam reveals no gallop and no friction rub.    No murmur heard.  Pulmonary/Chest: Breath sounds normal. No respiratory distress. He has no wheezes. He has no rhonchi. He has no rales.   Abdominal: Soft. Bowel sounds are normal. He exhibits no distension. There is no abdominal tenderness. There is no rebound. Hernia confirmed negative in the right inguinal area and confirmed negative in the left inguinal area.   Genitourinary:    Testes and penis normal.   Right testis shows no mass, no swelling and no tenderness. Left testis shows no mass, no swelling and no tenderness. No penile tenderness.   Musculoskeletal: No tenderness or edema.   Neurological: He is alert and oriented to person, place, and time. He has normal strength. No sensory deficit. GCS score is 15. GCS eye subscore is 4. GCS verbal subscore is 5. GCS motor subscore is 6.   Skin: Skin is warm. No erythema.         ED Course   Procedures  Labs Reviewed   CBC W/ AUTO DIFFERENTIAL   COMPREHENSIVE METABOLIC PANEL   URINALYSIS, REFLEX TO URINE CULTURE   LIPASE          Imaging Results    None          Medical Decision Making:   Initial Assessment:   This is a 64 y/o M with a PMHx of renal calculi who presents to the ER with C.C of 10/10 right sided flank pain that has been waxing waning and is now 1/10. He has no urinary complaints at this time. Pt had a negative screening AAA US 1 year ago. Pt abd is soft NT/ND. Pulses are equal in upper and lower extremities.   Differential Diagnosis:   Nephrolithiasis, hydronephrosis, septic stone, cystitis, pyelonephritis. Low suspicion for AAA given his  physical exam and recent negative US.   ED Management:  Pt UA is noninfectious. CT showed mild right hydroureteronephrosis attributable to a likely recently passed 3 mm stone which is seen in the bladder. Additional 2 mm stone at the mouth of the left UVJ and bilateral nephrolithiasis. He was instructed to follow up with urologist on the 9th, drink plenty of fluids and to take tylenol for pain and to avoid NSAIDs. Discharge and return precaution were discussed with Pt and she verbalized her understanding, Pt is stable for discharge at this time.                  Attending Attestation:   Physician Attestation Statement for Resident:  As the supervising MD   Physician Attestation Statement: I have personally seen and examined this patient.   I agree with the above history. -:   As the supervising MD I agree with the above PE.    As the supervising MD I agree with the above treatment, course, plan, and disposition.   -:   I saw and examined the patient.  I have reviewed and agree with the resident's findings, including all diagnostic interpretations and plans as written.  I was present for the key portions of the separately billed procedures.    65-year-old male with a history of nephrolithiasis presents in the emergency department with right flank pain.  Labs grossly unremarkable.  Creatinine 1.1.  Urinalysis with blood but no signs of infection.  CT scan shows 3 mm stone in the patient's bladder and mild right hydroureteronephrosis.  There is also a 2 mm stone in the left ureteral vesicular junction.  The patient's pain is completely resolved in the emergency department.  He is tolerating p.o. well and like to be discharged.  He will be discharged with short course of Norco and Zofran.  He has Flomax at home that he has been advised to take given his residual left-sided stone.  He has close follow-up with Dr. Beltran.  Detailed return precautions were discussed.    Татьяна Garcia MD  Emergency Medicine  07/03/2020  6:01 AM    I have reviewed and agree with the residents interpretation of the following: lab data and CT scans.  I have reviewed the following: old records at this facility.                                  Clinical Impression:       ICD-10-CM ICD-9-CM   1. Nephrolithiasis  N20.0 592.0                                Nathaniel Horvath MD  Resident  07/03/20 0221       Татьяна Garcia MD  07/03/20 0601

## 2020-07-03 NOTE — DISCHARGE INSTRUCTIONS
Please return to the ER if you have worsening pain not relieved by tylenol, fever, chills, nausea vomiting, or inability to urinate. Or if there are any other symptoms that are concerning to you.

## 2020-07-05 ENCOUNTER — LAB VISIT (OUTPATIENT)
Dept: PRIMARY CARE CLINIC | Facility: CLINIC | Age: 65
End: 2020-07-05
Payer: MEDICARE

## 2020-07-05 DIAGNOSIS — Z01.818 PRE-OP TESTING: ICD-10-CM

## 2020-07-05 PROCEDURE — U0003 INFECTIOUS AGENT DETECTION BY NUCLEIC ACID (DNA OR RNA); SEVERE ACUTE RESPIRATORY SYNDROME CORONAVIRUS 2 (SARS-COV-2) (CORONAVIRUS DISEASE [COVID-19]), AMPLIFIED PROBE TECHNIQUE, MAKING USE OF HIGH THROUGHPUT TECHNOLOGIES AS DESCRIBED BY CMS-2020-01-R: HCPCS | Mod: HCNC

## 2020-07-06 LAB — SARS-COV-2 RNA RESP QL NAA+PROBE: NEGATIVE

## 2020-07-08 ENCOUNTER — HOSPITAL ENCOUNTER (OUTPATIENT)
Facility: AMBULARY SURGERY CENTER | Age: 65
Discharge: HOME | End: 2020-07-08
Attending: UROLOGY | Admitting: UROLOGY
Payer: MEDICARE

## 2020-07-08 DIAGNOSIS — Z85.51 HISTORY OF BLADDER CANCER: ICD-10-CM

## 2020-07-08 PROCEDURE — 82365 CALCULUS SPECTROSCOPY: CPT | Mod: HCNC

## 2020-07-08 PROCEDURE — 52000 CYSTOURETHROSCOPY: CPT | Mod: HCNC,,, | Performed by: UROLOGY

## 2020-07-08 PROCEDURE — 52000 CYSTOURETHROSCOPY: CPT | Performed by: UROLOGY

## 2020-07-08 PROCEDURE — 52000 PR CYSTOURETHROSCOPY: ICD-10-PCS | Mod: HCNC,,, | Performed by: UROLOGY

## 2020-07-08 RX ORDER — WATER 1 ML/ML
IRRIGANT IRRIGATION
Status: DISCONTINUED | OUTPATIENT
Start: 2020-07-08 | End: 2020-07-08 | Stop reason: HOSPADM

## 2020-07-08 RX ORDER — LIDOCAINE HYDROCHLORIDE 20 MG/ML
JELLY TOPICAL
Status: DISCONTINUED | OUTPATIENT
Start: 2020-07-08 | End: 2020-07-08 | Stop reason: HOSPADM

## 2020-07-08 RX ORDER — TAMSULOSIN HYDROCHLORIDE 0.4 MG/1
0.4 CAPSULE ORAL DAILY
Qty: 30 CAPSULE | Refills: 6 | Status: SHIPPED | OUTPATIENT
Start: 2020-07-08 | End: 2021-01-25 | Stop reason: ALTCHOICE

## 2020-07-08 NOTE — H&P
[facility] Urology Progress Note     Edwin Sepulveda III is a 64 y.o. male ith history of bladder cancer who presents following TURBT with urethral dilation.     He has a  history of external beam radiation for prostate cancer in 2014, history of kidney stones, and most recently diagnosed with a bladder tumor in January 2019 after presenting with gross hematuria.  Cystoscopy in February 2019 by Dr. Santos revealed patent urethra the radiation changes of the bladder neck and 4 cm aggregate of typical TCC lesions in the left bladder wall, and was therefore taken for TURBT on 3/13/19 of which only a portion was resected and the remainder was fulgurated, and pathology revealed low-grade papillary urothelial carcinoma, noninvasive. He did experience brief urinary retention after De Leon catheter was removed on postop day 2, and then presented on 5/13/19 for catheterization and mitomycin-C instillation at [facility] during which there was difficult catheterization and subsequent urinary retention for which he ultimately passed a fill and pull voiding trial after using Flomax which he has since discontinued. Cystoscopic evaluation 7/9/19 revealed narrow bulbar urethral stricture just distal to the sphincter which was able to passively dilate with flexible cystoscope and a 3 cm bladder tumor/growth from left lateral bladder wall at site of previous known tumor appearing more granulomatous, Gray, and frondular in appearance rather than classic papillary appearance.     7/25/19: TURBT with urethral dilation  - approx 2.5-3cm area of erythema, hypervascularity, frondular fibrinous tissue and calcification at area of previous resection left lateral wall extending towards bladder neck  - erythema and hypervascularity extending onto bladder neck at prostatic urethra  - narrowed urethral segment at level of sphincter so serially dilated from 18-28 Ethiopian after which resectoscope was able to pass for TURBT     PATH:   1. .  "URINARY BLADDER TUMOR, LEFT LATERAL WALL:  - Extensive necrosis with acute inflammation, dystrophic calcification, focal metaplastic bone formation and reactive fibrosis; consistent with previous resection site (see comment).  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  - Focal cystitis cystica. Focal squamous metaplasia. No evidence of malignancy.  2. PROSTATIC URETHRA, TRANSURETHRAL RESECTION:  - Benign prostatic hyperplasia, stromal predominant. Benign prostatic glands with cellular atypia consistent with radiation atypia.  - Focal urethritis cystica. Focal basal cell hyperplasia. No evidence of malignancy.     He returns today noting:  He has also had history of R orchiectomy for spermatocele and history of kidney stones.  PSA 0.41 on 7/8/19.  Still feels like some irritation in bladder and urethra  Burning is mostly gone. Urgency is improving.  Stream is good many times, sometimes shower head  No blood in urine.  AUA SS: 10/3 (3 intermittency, 2: urgency, sleeping, 1: emptying, frequency, weak stream)  Udip  Mod blood, small leuks  PVR 41cc     ROS: A comprehensive 10 system review was performed and is negative except as noted above in HPI     PHYSICAL EXAM:           Vitals:     08/05/19 0828   BP: (!) 141/83   Pulse: 64   Resp: 18      Body mass index is 25.39 kg/m². Weight: 78 kg (171 lb 15.3 oz) Height: 5' 9" (175.3 cm)         General: Alert, cooperative, no distress, appears stated age   Head: Normocephalic, without obvious abnormality, atraumatic   Eyes: PERRL, conjunctiva/corneas clear   Lungs: Respirations unlabored   Heart: Warm and well perfused   Abdomen: soft nt nd  Extremities: Extremities normal, atraumatic, no cyanosis or edema   Skin: Skin color, texture, turgor normal, no rashes or lesions   Psych: Appropriate   Neurologic: Non-focal         Recent Results             Recent Results (from the past 336 hour(s))   POCT URINE " DIPSTICK WITHOUT MICROSCOPE     Collection Time: 08/05/19  8:39 AM   Result Value Ref Range     Color, UA yellow       Spec Grav UA 1.010       pH, UA 7       WBC, UA small       Nitrite, UA neg       Protein neg       Glucose, UA neg       Ketones, UA neg       Urobilinogen, UA 0.2       Bilirubin neg       Blood, UA mod              ASSESSMENT   1. History of bladder cancer  POCT URINE DIPSTICK WITHOUT MICROSCOPE     Case Request Operating Room: CYSTOSCOPY     Place in Outpatient   2. History of prostate cancer  Prostate Specific Antigen, Diagnostic   3. H/O urethral stricture  POCT Bladder Scan     Case Request Operating Room: CYSTOSCOPY     Place in Outpatient   4. Cells and casts in urine  Urine culture         Plan    Given low grade papillary UC in past with current negative resection. No further treatment needed. Risk benefit discussion about 3 vs 6 mos surveillance cysto. Given treatment of urethral stricture with most recent resection, will start with 3 month surveillance cystoscopy to surveil both bladder tumor and stricture recurrence. As he had low grade noninvasive urothelial carcinoma, if 3 mos cysto negative, will go to 6 mo schedule. Will get psa prior to continue to follow his post XRT trends. Will monitor LUTS at follow up as currently voiding well post XRT despite some prostatic obstruction.  We did discuss signs and symptoms of recurrence of urethral stricture and encouraged him to contact us should his obstructive urinary symptoms return or worsen prior to 3 months as we can always evaluate sooner.  We did discuss findings of cystitis cystica and urethritis cystica as chronic inflammatory findings, though he has never had urinary tract infections and does not have any symptoms as such and will continue to observe.  Should any dysuria or irritative symptoms persist can consider a low-dose course of antibiotics as suppression, but I do not feel it is indicated at this time.  We did discuss  urgency and frequency after transurethral resection of both bladder and prostate, as well as with relief of obstruction as his stricture was dilated.  The natural course of these is that over 4-6 weeks should improve, and as he is 2 weeks postoperative he is already finding improvement, and will continue to observe.  With some variations in his urinary stream, and slightly obstructing prostate tissue residual, advised that he can try Flomax for 1 week and see if it makes a difference.  He has a supply at home and will start and see if he improves.  If he is doing well on it, can discuss continuing versus discontinuing.  He is intermittently used in the past with relief of obstructive symptoms and done well upon discontinuing it, so will check in with him on this matter.  Cysto scheduled at [facility] on 10/29/19, and psa scheduled prior     Cysto had concern for recurrence.  November TURBT was negative for malignancy.  Returns today for surveillance.  As as a kidney stone in the interim.  Acceptable candidate for ASC.

## 2020-07-09 VITALS
HEART RATE: 81 BPM | SYSTOLIC BLOOD PRESSURE: 159 MMHG | OXYGEN SATURATION: 98 % | WEIGHT: 170 LBS | TEMPERATURE: 97 F | HEIGHT: 69 IN | RESPIRATION RATE: 20 BRPM | DIASTOLIC BLOOD PRESSURE: 80 MMHG | BODY MASS INDEX: 25.18 KG/M2

## 2020-07-10 LAB
COMPN STONE: NORMAL
SPECIMEN SOURCE: NORMAL
STONE ANALYSIS IR-IMP: NORMAL

## 2020-07-19 DIAGNOSIS — Z85.51 HISTORY OF BLADDER CANCER: Primary | ICD-10-CM

## 2020-07-19 RX ORDER — LIDOCAINE HYDROCHLORIDE 20 MG/ML
JELLY TOPICAL ONCE
Status: CANCELLED | OUTPATIENT
Start: 2020-07-19 | End: 2020-07-19

## 2020-07-19 NOTE — OP NOTE
[facility]ative Report/Brief Discharge Summary     Date: 7/8/20     Staff Surgeon: Kermit Beltran MD     Pre-Op Diagnosis: history of bladder cancer, history of XRT for prostate cancer, history of urethral stricture     Post-Op Diagnosis:   same     Procedure(s) Performed:   Cystoscopy, flexible     Specimen(s): stone for chemical analysis     Anesthesia: Local anesthesia, urojet     Findings:   Mild stable narrowing/recurrence of stricture just distal to the sphincter which was easily passable with flexible cystoscope without resistance.   Punctate calcifications and overlying erythema at left lateral bladder wall site of previous tumor without gross recurrence.   Hypervascularity/radiation changes in prostatic urethra and at bladder base  Prostatic lateral lobe enlargement with mild obstruction     Estimated Blood Loss: none     Drains: none     Complications: none     Indications for procedure:  64-year-old man with history of external beam radiation for prostate cancer in 2014, history of kidney stones, and most recently diagnosed with a bladder tumor in January 2019 after presenting with gross hematuria.  Cystoscopy in February 2019 by Dr. Santos revealed patent urethra the radiation changes of the bladder neck and 4 cm aggregate of typical TCC lesions in the left bladder wall, and was therefore taken for TURBT on 3/13/19 of which only a portion was resected and the remainder was fulgurated, and pathology revealed low-grade papillary urothelial carcinoma, noninvasive.  He did experience brief urinary retention after De Leon catheter was removed on postop day 2, and then presented on 5/13/19 for catheterization and mitomycin-C instillation at [facility] during which there was difficult catheterization and subsequent urinary retention for which he ultimately passed a fill and pull voiding trial after using Flomax which he has since discontinued. Cystoscopic evaluation 7/9/19 revealed narrow  bulbar urethral stricture just distal to the sphincter which was able to passively dilate with flexible cystoscope and a 3 cm bladder tumor/growth from left lateral bladder wall at site of previous known tumor appearing more granulomatous, Gray, and frondular in appearance rather than classic papillary appearance. He presented 7/25/19 for TURBT with resection of this area of hypervascularity with frondular fibrinous tissue as well as erythema and hypervascularity extending on to the bladder neck and prostatic urethra.  Urethral stricture was dilated from 18-28 Malay to pass resectoscope.  Pathology revealed extensive necrosis with acute inflammation and dystrophic calcification consistent with previous resection site as well as chronic follicular cystitis and mild reactive atypia consistent with radiation change.  In Oct 2020 had AUA symptom score 10/3 and postvoid residual of 41 cc. Cysto 10/29/19 found minimal concern for structure recurrence passable with cystoscope and abnormalities of bladder neck and left lateral wall at prior resection site and had formal OR biopsy 11/14/19 which was negative. Interim Ucytology 3/10/20 negative. He returns for surveillance cystoscopy  In interim, last week did go to [facility] ER with kidney stone episode, since which he has passed one which he brings in for analysis.    Procedure in detail:  After informed consent, the patient was prepped and drapped in standard cystoscopic fashion and 2% xylocaine jelly was instilled into the urethra. A flexible cystoscope was passed into the bladder via the urethra .     Anterior urethra had mild stricture recurrence, passable, as noted above. Mild prostatic lateral lobe enlargement and ingrowth.  Mild radiation prostatitis      The bladder was then systematically inspected. Bladder neck demonstrated good coaptation. In area of previous tumor management and resection, on the left lateral wall just posterior lateral to the ureteral orifice and  trigone, were mild inflammatory changes as noted above. Ureteral orifice unobstructed.     The rest of his bladder mucosa appeared grossly normal with the exception of mild trabeculations and signs of early radiation cystitis. Bilateral ureteral orifices seen in their orthotopic position on the trigone bilaterally.     Pictures of urethral and bladder abnormalities  captured and placed on patient's chart. He tolerated the procedure well with no complications.      Disposition:   Given absence of gross recurrence of bladder tumor after resected as low-grade noninvasive, with more recent interventions in biopsies demonstrating benign inflammatory findings only, his cystoscopic surveillance is certainly consistent with that today.  From the standpoint of bladder cancer surveillance, cystoscopy in 6 months is appropriate.  This has been planned for 1/12/20 and I will have him return is a nurse visit 2 weeks prior for urine culture and urine cytology.    However, he does have urethral stricture, likely complicated from previous traumatic catheterization and history of radiation, which is stable at this time.  To monitor for worsening or recurrence, as well as given some prostatic obstruction, I will have him present for an interim nurse visit in 3 months for uroflow, PVR, AUA symptom score.  If his uroflow is grossly obstructed or has concerning pattern for stricture or his symptoms are worsening, may consider scoping earlier to both reassess for stricture recurrence as well as worsening prostatic obstruction and obstructive changes within the bladder to discuss interim management.    As it relates to his recent kidney stones, I did extensively reviewed his most recent CT scan from the emergency department with the patient and his wife.  This distal 3 mm right stone with hydronephrosis was seen at the UVJ on coronal imaging though appeared within the bladder on axial imaging, and this is like the stone he passed which I  will send for chemical analysis today.  We did review that he has a 7.6 mm stone in the left kidney which is nonobstructing and can follow this going forward as may need elective management.  I will have him get a KUB at the time of returning in 3 months for his nurse visit for reassessment of stones.    He was taking Flomax from time of your visit to facilitate stone passage which he did complete a medical trial of passage.  He has otherwise been off of Flomax from a urinary standpoint and takes it only surrounding cystoscopic procedures as he has experienced urinary retention after cystoscopy before.  He otherwise feels relatively stable with his LUTS.  Have prescribed a backup prescription of Flomax to have on file given his prostatic obstruction in case his symptoms worsen in the interim and will leave it to his discretion to restart if needed, and will otherwise review clinical data at his nurse visit in 3 months and provide further recommendations at that time.    Reviewed recommendations for stone prevention such as adequate daily hydration to produce goal daily UOP > 2L, low salt low sodium moderate protein diet, avoiding tea and other oxalate rich foods, and use of fresh lemon as citrate is a natural stone inhibitor.    Discharge home today status post uncomplicated procedure as above  Diet - resume home diet (with stone prevention recs above)  Follow up: 3 month NV for uroflow/pvr/auass and KUB  1/12 at [facility] for [facility] (with NV 2 weeks prior for ucytology/ucx)  Instructions: drink plenty of water, may see blood in urine, take abx as directed, resume nightly Flomax a few nights - consider remaining on vs restarting  Meds     Medication List      CONTINUE taking these medications    HYDROcodone-acetaminophen 5-325 mg per tablet  Commonly known as: NORCO  Take 1 tablet by mouth every 4 (four) hours as needed for Pain.     ondansetron 4 MG tablet  Commonly known as: ZOFRAN  Take 1 tablet (4 mg total) by mouth  every 6 (six) hours.     tamsulosin 0.4 mg Cap  Commonly known as: FLOMAX  Take 1 capsule (0.4 mg total) by mouth once daily.        STOP taking these medications    lidocaine HCL 2% 2 % jelly  Commonly known as: XYLOCAINE           Where to Get Your Medications      These medications were sent to [id number] - [place] - [address] AT [facility] [address]  [address] [address]    Phone: [phone]   · tamsulosin 0.4 mg Cap

## 2020-07-22 ENCOUNTER — NURSE TRIAGE (OUTPATIENT)
Dept: ADMINISTRATIVE | Facility: CLINIC | Age: 65
End: 2020-07-22

## 2020-07-22 NOTE — TELEPHONE ENCOUNTER
Post procedure/covid concern follow up NT call - 1st attempt no answer - left message  [id number] spoke with pt - denies any symptoms

## 2020-07-22 NOTE — TELEPHONE ENCOUNTER
Reason for Disposition   [1] Follow-up call to recent contact AND [2] information only call, no triage required    Additional Information   Negative: Requesting regular office appointment   Negative: [1] Caller requesting NON-URGENT health information AND [2] PCP's office is the best resource   Negative: RN needs further essential information from caller in order to complete triage   Negative: [1] Caller is not with the adult (patient) AND [2] reporting urgent symptoms   Negative: Lab result questions   Negative: Medication questions   Negative: Caller can't be reached by phone   Negative: Caller has already spoken to PCP or another triager    Protocols used: INFORMATION ONLY CALL - NO TRIAGE-A-

## 2020-10-05 ENCOUNTER — PATIENT MESSAGE (OUTPATIENT)
Dept: ADMINISTRATIVE | Facility: HOSPITAL | Age: 65
End: 2020-10-05

## 2020-10-06 ENCOUNTER — PATIENT MESSAGE (OUTPATIENT)
Dept: UROLOGY | Facility: CLINIC | Age: 65
End: 2020-10-06

## 2020-10-08 ENCOUNTER — CLINICAL SUPPORT (OUTPATIENT)
Dept: UROLOGY | Facility: CLINIC | Age: 65
End: 2020-10-08
Payer: MEDICARE

## 2020-10-08 ENCOUNTER — HOSPITAL ENCOUNTER (OUTPATIENT)
Dept: RADIOLOGY | Facility: HOSPITAL | Age: 65
Discharge: HOME | End: 2020-10-08
Attending: UROLOGY
Payer: MEDICARE

## 2020-10-08 DIAGNOSIS — Z85.51 HISTORY OF BLADDER CANCER: ICD-10-CM

## 2020-10-08 DIAGNOSIS — Z85.51 HISTORY OF BLADDER CANCER: Primary | ICD-10-CM

## 2020-10-08 LAB — POC RESIDUAL URINE VOLUME: 11 ML (ref 0–100)

## 2020-10-08 PROCEDURE — 74018 RADEX ABDOMEN 1 VIEW: CPT | Mod: TC,HCNC,FY

## 2020-10-08 PROCEDURE — 51798 US URINE CAPACITY MEASURE: CPT | Mod: HCNC,S$GLB,, | Performed by: UROLOGY

## 2020-10-08 PROCEDURE — 51741 PR UROFLOWMETRY, COMPLEX: ICD-10-PCS | Mod: HCNC,S$GLB,ICN, | Performed by: UROLOGY

## 2020-10-08 PROCEDURE — 51741 ELECTRO-UROFLOWMETRY FIRST: CPT | Mod: HCNC,S$GLB,ICN, | Performed by: UROLOGY

## 2020-10-08 PROCEDURE — 99499 NO LOS: ICD-10-PCS | Mod: HCNC,S$GLB,, | Performed by: UROLOGY

## 2020-10-08 PROCEDURE — 74018 RADEX ABDOMEN 1 VIEW: CPT | Mod: 26,HCNC,, | Performed by: RADIOLOGY

## 2020-10-08 PROCEDURE — 74018 XR ABDOMEN AP 1 VIEW: ICD-10-PCS | Mod: 26,HCNC,, | Performed by: RADIOLOGY

## 2020-10-08 PROCEDURE — 51798 POCT BLADDER SCAN: ICD-10-PCS | Mod: HCNC,S$GLB,, | Performed by: UROLOGY

## 2020-10-08 PROCEDURE — 99499 UNLISTED E&M SERVICE: CPT | Mod: HCNC,S$GLB,, | Performed by: UROLOGY

## 2020-10-08 NOTE — PROGRESS NOTES
Uroflow results (date: 10/08/2020) on :   Voiding time: 46.2s,   Flow time: 40.9s,   TTP flow: 5.1s,   Peak flowrate: 11.4 mL/s,   Average flowrate: 5.5mL/s,   Intervals: 5,   Voided volume: 221 mL,   Pvr by bladder scan: 11.   Pattern of curve: to be determined by physician.    AUASS:  Incomplete emptying- 0  Frequency- 1  Intermittency- 0  Urgency- 1  Weak Stream- 0  Straining- 0  Sleeping- 1  Total- 3  QOL- 2  PVR- 11  ?

## 2020-10-23 DIAGNOSIS — N20.0 RENAL CALCULUS, LEFT: Primary | ICD-10-CM

## 2020-10-23 DIAGNOSIS — Z85.51 HISTORY OF BLADDER CANCER: Primary | ICD-10-CM

## 2020-10-23 NOTE — PROGRESS NOTES
Uroflow parameters reviewed as documented below in nurse visit note.  Review of urinary flow pattern demonstrates a reasonable voiding curve with a low peaking plateau with mild intermittency but overall reasonable curve, trialing off towards the end with mild terminal intermittency.  As he reports minimal obstructive LUTS and empties well with a PVR of 11 cc, no intervention is needed until his surveillance cystoscopy in January at which time we will reassess for any stricture or obstruction.

## 2020-12-21 ENCOUNTER — PATIENT MESSAGE (OUTPATIENT)
Dept: SURGERY | Facility: AMBULARY SURGERY CENTER | Age: 65
End: 2020-12-21

## 2020-12-27 ENCOUNTER — PATIENT MESSAGE (OUTPATIENT)
Dept: SURGERY | Facility: AMBULARY SURGERY CENTER | Age: 65
End: 2020-12-27

## 2020-12-27 RX ORDER — CIPROFLOXACIN 500 MG/1
500 TABLET ORAL 2 TIMES DAILY
Qty: 6 TABLET | Refills: 0 | Status: SHIPPED | OUTPATIENT
Start: 2020-12-27 | End: 2021-01-26 | Stop reason: SDUPTHER

## 2020-12-30 ENCOUNTER — HOSPITAL ENCOUNTER (OUTPATIENT)
Dept: RADIOLOGY | Facility: HOSPITAL | Age: 65
Discharge: HOME | End: 2020-12-30
Attending: UROLOGY
Payer: MEDICARE

## 2020-12-30 ENCOUNTER — CLINICAL SUPPORT (OUTPATIENT)
Dept: UROLOGY | Facility: CLINIC | Age: 65
End: 2020-12-30
Payer: MEDICARE

## 2020-12-30 DIAGNOSIS — Z85.51 HISTORY OF BLADDER CANCER: ICD-10-CM

## 2020-12-30 DIAGNOSIS — R31.9 HEMATURIA OF UNDIAGNOSED CAUSE: Primary | ICD-10-CM

## 2020-12-30 DIAGNOSIS — N20.0 RENAL CALCULUS, LEFT: ICD-10-CM

## 2020-12-30 DIAGNOSIS — Z01.818 PRE-OP TESTING: ICD-10-CM

## 2020-12-30 LAB
BILIRUB UR QL STRIP: NEGATIVE
CLARITY UR: CLEAR
COLOR UR: YELLOW
GLUCOSE UR QL STRIP: NEGATIVE
HGB UR QL STRIP: NEGATIVE
KETONES UR QL STRIP: NEGATIVE
LEUKOCYTE ESTERASE UR QL STRIP: NEGATIVE
NITRITE UR QL STRIP: NEGATIVE
PH UR STRIP: 7 [PH] (ref 5–8)
PROT UR QL STRIP: NEGATIVE
SP GR UR STRIP: 1.01 (ref 1–1.03)
URN SPEC COLLECT METH UR: NORMAL
UROBILINOGEN UR STRIP-ACNC: NEGATIVE EU/DL

## 2020-12-30 PROCEDURE — 74018 RADEX ABDOMEN 1 VIEW: CPT | Mod: TC,HCNC,FY

## 2020-12-30 PROCEDURE — 87086 URINE CULTURE/COLONY COUNT: CPT | Mod: HCNC

## 2020-12-30 PROCEDURE — 74018 XR ABDOMEN AP 1 VIEW: ICD-10-PCS | Mod: 26,HCNC,, | Performed by: RADIOLOGY

## 2020-12-30 PROCEDURE — 88112 CYTOPATH CELL ENHANCE TECH: CPT | Mod: 26,HCNC,, | Performed by: PATHOLOGY

## 2020-12-30 PROCEDURE — 88112 PR  CYTOPATH, CELL ENHANCE TECH: ICD-10-PCS | Mod: 26,HCNC,, | Performed by: PATHOLOGY

## 2020-12-30 PROCEDURE — 74018 RADEX ABDOMEN 1 VIEW: CPT | Mod: 26,HCNC,, | Performed by: RADIOLOGY

## 2020-12-30 PROCEDURE — 88112 CYTOPATH CELL ENHANCE TECH: CPT | Mod: HCNC | Performed by: PATHOLOGY

## 2020-12-30 PROCEDURE — 99499 UNLISTED E&M SERVICE: CPT | Mod: HCNC,,, | Performed by: UROLOGY

## 2020-12-30 PROCEDURE — 81003 URINALYSIS AUTO W/O SCOPE: CPT | Mod: HCNC

## 2020-12-30 PROCEDURE — 99499 NO LOS: ICD-10-PCS | Mod: HCNC,,, | Performed by: UROLOGY

## 2020-12-30 NOTE — PROGRESS NOTES
Patient arrive in clinic today for urine specimen collection.  2 patient identifiers verified.  Clean catch mid steam specimen collected.  Sent to lab for urinalysis, urine culture and urine cytology.  Informed patient the office will call with results.  Patient left office in satisfactory condition

## 2020-12-31 LAB — FINAL PATHOLOGIC DIAGNOSIS: NORMAL

## 2021-01-01 LAB — BACTERIA UR CULT: NORMAL

## 2021-01-04 ENCOUNTER — PATIENT MESSAGE (OUTPATIENT)
Dept: ADMINISTRATIVE | Facility: HOSPITAL | Age: 66
End: 2021-01-04

## 2021-01-09 ENCOUNTER — LAB VISIT (OUTPATIENT)
Dept: PRIMARY CARE CLINIC | Facility: CLINIC | Age: 66
End: 2021-01-09
Payer: MEDICARE

## 2021-01-09 DIAGNOSIS — Z85.51 HISTORY OF BLADDER CANCER: ICD-10-CM

## 2021-01-09 PROCEDURE — U0003 INFECTIOUS AGENT DETECTION BY NUCLEIC ACID (DNA OR RNA); SEVERE ACUTE RESPIRATORY SYNDROME CORONAVIRUS 2 (SARS-COV-2) (CORONAVIRUS DISEASE [COVID-19]), AMPLIFIED PROBE TECHNIQUE, MAKING USE OF HIGH THROUGHPUT TECHNOLOGIES AS DESCRIBED BY CMS-2020-01-R: HCPCS | Mod: HCNC

## 2021-01-10 LAB — SARS-COV-2 RNA RESP QL NAA+PROBE: NOT DETECTED

## 2021-01-12 ENCOUNTER — HOSPITAL ENCOUNTER (OUTPATIENT)
Facility: AMBULARY SURGERY CENTER | Age: 66
Discharge: HOME | End: 2021-01-12
Attending: UROLOGY | Admitting: UROLOGY
Payer: MEDICARE

## 2021-01-12 DIAGNOSIS — Z85.51 HISTORY OF BLADDER CANCER: ICD-10-CM

## 2021-01-12 PROCEDURE — 52000 PR CYSTOURETHROSCOPY: ICD-10-PCS | Mod: 22,HCNC,, | Performed by: UROLOGY

## 2021-01-12 PROCEDURE — 52000 CYSTOURETHROSCOPY: CPT | Mod: 22,HCNC,, | Performed by: UROLOGY

## 2021-01-12 PROCEDURE — 52000 CYSTOURETHROSCOPY: CPT | Performed by: UROLOGY

## 2021-01-12 PROCEDURE — 53620 DILATE URETHRA STRICTURE: CPT | Performed by: UROLOGY

## 2021-01-12 RX ORDER — LIDOCAINE HYDROCHLORIDE 20 MG/ML
JELLY TOPICAL
Status: DISCONTINUED | OUTPATIENT
Start: 2021-01-12 | End: 2021-01-12 | Stop reason: HOSPADM

## 2021-01-12 RX ORDER — WATER 1 ML/ML
IRRIGANT IRRIGATION
Status: DISCONTINUED | OUTPATIENT
Start: 2021-01-12 | End: 2021-01-12 | Stop reason: HOSPADM

## 2021-01-12 RX ORDER — LIDOCAINE HYDROCHLORIDE 20 MG/ML
JELLY TOPICAL
Status: DISCONTINUED
Start: 2021-01-12 | End: 2021-01-12 | Stop reason: HOSPADM

## 2021-01-13 VITALS
WEIGHT: 170 LBS | HEART RATE: 87 BPM | SYSTOLIC BLOOD PRESSURE: 161 MMHG | HEIGHT: 69 IN | OXYGEN SATURATION: 97 % | RESPIRATION RATE: 18 BRPM | TEMPERATURE: 98 F | BODY MASS INDEX: 25.18 KG/M2 | DIASTOLIC BLOOD PRESSURE: 87 MMHG

## 2021-01-23 ENCOUNTER — PATIENT MESSAGE (OUTPATIENT)
Dept: UROLOGY | Facility: CLINIC | Age: 66
End: 2021-01-23

## 2021-01-23 ENCOUNTER — HOSPITAL ENCOUNTER (EMERGENCY)
Facility: HOSPITAL | Age: 66
Discharge: HOME | End: 2021-01-23
Attending: EMERGENCY MEDICINE
Payer: MEDICARE

## 2021-01-23 ENCOUNTER — PATIENT MESSAGE (OUTPATIENT)
Dept: FAMILY MEDICINE | Facility: CLINIC | Age: 66
End: 2021-01-23

## 2021-01-23 VITALS
RESPIRATION RATE: 19 BRPM | BODY MASS INDEX: 25.18 KG/M2 | OXYGEN SATURATION: 95 % | DIASTOLIC BLOOD PRESSURE: 85 MMHG | SYSTOLIC BLOOD PRESSURE: 187 MMHG | TEMPERATURE: 98 F | WEIGHT: 170 LBS | HEIGHT: 69 IN | HEART RATE: 57 BPM

## 2021-01-23 DIAGNOSIS — N20.0 KIDNEY STONE: Primary | ICD-10-CM

## 2021-01-23 LAB
ALBUMIN SERPL BCP-MCNC: 4.3 G/DL (ref 3.5–5.2)
ALP SERPL-CCNC: 57 U/L (ref 55–135)
ALT SERPL W/O P-5'-P-CCNC: 14 U/L (ref 10–44)
ANION GAP SERPL CALC-SCNC: 10 MMOL/L (ref 8–16)
AST SERPL-CCNC: 20 U/L (ref 10–40)
BASOPHILS # BLD AUTO: 0.05 K/UL (ref 0–0.2)
BASOPHILS NFR BLD: 0.6 % (ref 0–1.9)
BILIRUB SERPL-MCNC: 1.3 MG/DL (ref 0.1–1)
BILIRUB UR QL STRIP: NEGATIVE
BUN SERPL-MCNC: 20 MG/DL (ref 8–23)
CALCIUM SERPL-MCNC: 9.1 MG/DL (ref 8.7–10.5)
CHLORIDE SERPL-SCNC: 103 MMOL/L (ref 95–110)
CLARITY UR: CLEAR
CO2 SERPL-SCNC: 22 MMOL/L (ref 23–29)
COLOR UR: YELLOW
CREAT SERPL-MCNC: 1.4 MG/DL (ref 0.5–1.4)
DIFFERENTIAL METHOD: ABNORMAL
EOSINOPHIL # BLD AUTO: 0 K/UL (ref 0–0.5)
EOSINOPHIL NFR BLD: 0.2 % (ref 0–8)
ERYTHROCYTE [DISTWIDTH] IN BLOOD BY AUTOMATED COUNT: 12.8 % (ref 11.5–14.5)
EST. GFR  (AFRICAN AMERICAN): >60 ML/MIN/1.73 M^2
EST. GFR  (NON AFRICAN AMERICAN): 52 ML/MIN/1.73 M^2
GLUCOSE SERPL-MCNC: 128 MG/DL (ref 70–110)
GLUCOSE UR QL STRIP: NEGATIVE
HCT VFR BLD AUTO: 44.1 % (ref 40–54)
HGB BLD-MCNC: 15 G/DL (ref 14–18)
HGB UR QL STRIP: NEGATIVE
IMM GRANULOCYTES # BLD AUTO: 0.03 K/UL (ref 0–0.04)
IMM GRANULOCYTES NFR BLD AUTO: 0.3 % (ref 0–0.5)
KETONES UR QL STRIP: ABNORMAL
LEUKOCYTE ESTERASE UR QL STRIP: NEGATIVE
LYMPHOCYTES # BLD AUTO: 0.8 K/UL (ref 1–4.8)
LYMPHOCYTES NFR BLD: 8.7 % (ref 18–48)
MCH RBC QN AUTO: 30.1 PG (ref 27–31)
MCHC RBC AUTO-ENTMCNC: 34 G/DL (ref 32–36)
MCV RBC AUTO: 89 FL (ref 82–98)
MONOCYTES # BLD AUTO: 0.7 K/UL (ref 0.3–1)
MONOCYTES NFR BLD: 8.1 % (ref 4–15)
NEUTROPHILS # BLD AUTO: 7.3 K/UL (ref 1.8–7.7)
NEUTROPHILS NFR BLD: 82.1 % (ref 38–73)
NITRITE UR QL STRIP: NEGATIVE
NRBC BLD-RTO: 0 /100 WBC
PH UR STRIP: 8 [PH] (ref 5–8)
PLATELET # BLD AUTO: 162 K/UL (ref 150–350)
PMV BLD AUTO: 10.4 FL (ref 9.2–12.9)
POTASSIUM SERPL-SCNC: 4.1 MMOL/L (ref 3.5–5.1)
PROT SERPL-MCNC: 7.3 G/DL (ref 6–8.4)
PROT UR QL STRIP: NEGATIVE
RBC # BLD AUTO: 4.98 M/UL (ref 4.6–6.2)
SODIUM SERPL-SCNC: 135 MMOL/L (ref 136–145)
SP GR UR STRIP: 1.02 (ref 1–1.03)
URN SPEC COLLECT METH UR: ABNORMAL
UROBILINOGEN UR STRIP-ACNC: NEGATIVE EU/DL
WBC # BLD AUTO: 8.84 K/UL (ref 3.9–12.7)

## 2021-01-23 PROCEDURE — 99284 EMERGENCY DEPT VISIT MOD MDM: CPT | Mod: 25

## 2021-01-23 PROCEDURE — 85025 COMPLETE CBC W/AUTO DIFF WBC: CPT

## 2021-01-23 PROCEDURE — 80053 COMPREHEN METABOLIC PANEL: CPT

## 2021-01-23 PROCEDURE — 63600175 PHARM REV CODE 636 W HCPCS: Performed by: EMERGENCY MEDICINE

## 2021-01-23 PROCEDURE — 96374 THER/PROPH/DIAG INJ IV PUSH: CPT

## 2021-01-23 PROCEDURE — 96375 TX/PRO/DX INJ NEW DRUG ADDON: CPT

## 2021-01-23 PROCEDURE — 81003 URINALYSIS AUTO W/O SCOPE: CPT

## 2021-01-23 RX ORDER — ONDANSETRON 4 MG/1
4 TABLET, FILM COATED ORAL EVERY 6 HOURS
Qty: 12 TABLET | Refills: 0 | Status: SHIPPED | OUTPATIENT
Start: 2021-01-23 | End: 2021-06-10

## 2021-01-23 RX ORDER — HYDROMORPHONE HYDROCHLORIDE 1 MG/ML
0.5 INJECTION, SOLUTION INTRAMUSCULAR; INTRAVENOUS; SUBCUTANEOUS
Status: COMPLETED | OUTPATIENT
Start: 2021-01-23 | End: 2021-01-23

## 2021-01-23 RX ORDER — ONDANSETRON 2 MG/ML
4 INJECTION INTRAMUSCULAR; INTRAVENOUS
Status: COMPLETED | OUTPATIENT
Start: 2021-01-23 | End: 2021-01-23

## 2021-01-23 RX ORDER — HYDROCODONE BITARTRATE AND ACETAMINOPHEN 5; 325 MG/1; MG/1
1 TABLET ORAL EVERY 8 HOURS PRN
Qty: 9 TABLET | Refills: 0 | Status: SHIPPED | OUTPATIENT
Start: 2021-01-23 | End: 2021-06-10

## 2021-01-23 RX ADMIN — HYDROMORPHONE HYDROCHLORIDE 0.5 MG: 1 INJECTION, SOLUTION INTRAMUSCULAR; INTRAVENOUS; SUBCUTANEOUS at 11:01

## 2021-01-23 RX ADMIN — ONDANSETRON 4 MG: 2 INJECTION INTRAMUSCULAR; INTRAVENOUS at 11:01

## 2021-01-24 ENCOUNTER — PATIENT OUTREACH (OUTPATIENT)
Dept: ADMINISTRATIVE | Facility: OTHER | Age: 66
End: 2021-01-24

## 2021-01-25 ENCOUNTER — TELEPHONE (OUTPATIENT)
Dept: UROLOGY | Facility: CLINIC | Age: 66
End: 2021-01-25

## 2021-01-25 ENCOUNTER — LAB VISIT (OUTPATIENT)
Dept: PRIMARY CARE CLINIC | Facility: CLINIC | Age: 66
End: 2021-01-25
Payer: MEDICARE

## 2021-01-25 ENCOUNTER — PATIENT MESSAGE (OUTPATIENT)
Dept: SURGERY | Facility: HOSPITAL | Age: 66
End: 2021-01-25

## 2021-01-25 ENCOUNTER — OFFICE VISIT (OUTPATIENT)
Dept: UROLOGY | Facility: CLINIC | Age: 66
End: 2021-01-25
Payer: MEDICARE

## 2021-01-25 ENCOUNTER — PATIENT MESSAGE (OUTPATIENT)
Dept: UROLOGY | Facility: CLINIC | Age: 66
End: 2021-01-25

## 2021-01-25 VITALS
HEART RATE: 79 BPM | BODY MASS INDEX: 25.57 KG/M2 | WEIGHT: 172.63 LBS | HEIGHT: 69 IN | DIASTOLIC BLOOD PRESSURE: 71 MMHG | SYSTOLIC BLOOD PRESSURE: 119 MMHG

## 2021-01-25 VITALS — BODY MASS INDEX: 25.48 KG/M2 | HEIGHT: 69 IN | WEIGHT: 172 LBS

## 2021-01-25 DIAGNOSIS — Z87.448 H/O URETHRAL STRICTURE: ICD-10-CM

## 2021-01-25 DIAGNOSIS — N20.1 LEFT URETERAL CALCULUS: ICD-10-CM

## 2021-01-25 DIAGNOSIS — N20.1 LEFT URETERAL CALCULUS: Primary | ICD-10-CM

## 2021-01-25 DIAGNOSIS — Z85.51 HISTORY OF BLADDER CANCER: ICD-10-CM

## 2021-01-25 LAB
BILIRUB SERPL-MCNC: ABNORMAL MG/DL
BLOOD URINE, POC: ABNORMAL
CLARITY, POC UA: CLEAR
COLOR, POC UA: YELLOW
GLUCOSE UR QL STRIP: ABNORMAL
KETONES UR QL STRIP: ABNORMAL
LEUKOCYTE ESTERASE URINE, POC: ABNORMAL
NITRITE, POC UA: ABNORMAL
PH, POC UA: 5
PROTEIN, POC: ABNORMAL
SPECIFIC GRAVITY, POC UA: 1
UROBILINOGEN, POC UA: 0.2

## 2021-01-25 PROCEDURE — 3078F PR MOST RECENT DIASTOLIC BLOOD PRESSURE < 80 MM HG: ICD-10-PCS | Mod: CPTII,S$GLB,, | Performed by: UROLOGY

## 2021-01-25 PROCEDURE — 99215 OFFICE O/P EST HI 40 MIN: CPT | Mod: 25,S$GLB,, | Performed by: UROLOGY

## 2021-01-25 PROCEDURE — 1126F PR PAIN SEVERITY QUANTIFIED, NO PAIN PRESENT: ICD-10-PCS | Mod: S$GLB,,, | Performed by: UROLOGY

## 2021-01-25 PROCEDURE — 81002 POCT URINE DIPSTICK WITHOUT MICROSCOPE: ICD-10-PCS | Mod: S$GLB,,, | Performed by: UROLOGY

## 2021-01-25 PROCEDURE — 99999 PR PBB SHADOW E&M-EST. PATIENT-LVL IV: CPT | Mod: PBBFAC,,, | Performed by: UROLOGY

## 2021-01-25 PROCEDURE — 1101F PR PT FALLS ASSESS DOC 0-1 FALLS W/OUT INJ PAST YR: ICD-10-PCS | Mod: CPTII,S$GLB,, | Performed by: UROLOGY

## 2021-01-25 PROCEDURE — 99215 PR OFFICE/OUTPT VISIT, EST, LEVL V, 40-54 MIN: ICD-10-PCS | Mod: 25,S$GLB,, | Performed by: UROLOGY

## 2021-01-25 PROCEDURE — 3288F FALL RISK ASSESSMENT DOCD: CPT | Mod: CPTII,S$GLB,, | Performed by: UROLOGY

## 2021-01-25 PROCEDURE — 1101F PT FALLS ASSESS-DOCD LE1/YR: CPT | Mod: CPTII,S$GLB,, | Performed by: UROLOGY

## 2021-01-25 PROCEDURE — U0003 INFECTIOUS AGENT DETECTION BY NUCLEIC ACID (DNA OR RNA); SEVERE ACUTE RESPIRATORY SYNDROME CORONAVIRUS 2 (SARS-COV-2) (CORONAVIRUS DISEASE [COVID-19]), AMPLIFIED PROBE TECHNIQUE, MAKING USE OF HIGH THROUGHPUT TECHNOLOGIES AS DESCRIBED BY CMS-2020-01-R: HCPCS

## 2021-01-25 PROCEDURE — 81002 URINALYSIS NONAUTO W/O SCOPE: CPT | Mod: S$GLB,,, | Performed by: UROLOGY

## 2021-01-25 PROCEDURE — 3008F PR BODY MASS INDEX (BMI) DOCUMENTED: ICD-10-PCS | Mod: CPTII,S$GLB,, | Performed by: UROLOGY

## 2021-01-25 PROCEDURE — 3008F BODY MASS INDEX DOCD: CPT | Mod: CPTII,S$GLB,, | Performed by: UROLOGY

## 2021-01-25 PROCEDURE — 3078F DIAST BP <80 MM HG: CPT | Mod: CPTII,S$GLB,, | Performed by: UROLOGY

## 2021-01-25 PROCEDURE — 1126F AMNT PAIN NOTED NONE PRSNT: CPT | Mod: S$GLB,,, | Performed by: UROLOGY

## 2021-01-25 PROCEDURE — 1159F MED LIST DOCD IN RCRD: CPT | Mod: S$GLB,,, | Performed by: UROLOGY

## 2021-01-25 PROCEDURE — 1159F PR MEDICATION LIST DOCUMENTED IN MEDICAL RECORD: ICD-10-PCS | Mod: S$GLB,,, | Performed by: UROLOGY

## 2021-01-25 PROCEDURE — 99999 PR PBB SHADOW E&M-EST. PATIENT-LVL IV: ICD-10-PCS | Mod: PBBFAC,,, | Performed by: UROLOGY

## 2021-01-25 PROCEDURE — 3074F PR MOST RECENT SYSTOLIC BLOOD PRESSURE < 130 MM HG: ICD-10-PCS | Mod: CPTII,S$GLB,, | Performed by: UROLOGY

## 2021-01-25 PROCEDURE — 3288F PR FALLS RISK ASSESSMENT DOCUMENTED: ICD-10-PCS | Mod: CPTII,S$GLB,, | Performed by: UROLOGY

## 2021-01-25 PROCEDURE — 3074F SYST BP LT 130 MM HG: CPT | Mod: CPTII,S$GLB,, | Performed by: UROLOGY

## 2021-01-25 PROCEDURE — 87086 URINE CULTURE/COLONY COUNT: CPT

## 2021-01-25 RX ORDER — TAMSULOSIN HYDROCHLORIDE 0.4 MG/1
0.4 CAPSULE ORAL NIGHTLY
COMMUNITY
End: 2021-06-10

## 2021-01-25 RX ORDER — CIPROFLOXACIN 2 MG/ML
400 INJECTION, SOLUTION INTRAVENOUS
Status: CANCELLED | OUTPATIENT
Start: 2021-01-25

## 2021-01-26 ENCOUNTER — PATIENT MESSAGE (OUTPATIENT)
Dept: UROLOGY | Facility: CLINIC | Age: 66
End: 2021-01-26

## 2021-01-26 ENCOUNTER — HOSPITAL ENCOUNTER (OUTPATIENT)
Dept: PREADMISSION TESTING | Facility: HOSPITAL | Age: 66
Discharge: HOME | End: 2021-01-26

## 2021-01-26 ENCOUNTER — PATIENT MESSAGE (OUTPATIENT)
Dept: SURGERY | Facility: HOSPITAL | Age: 66
End: 2021-01-26

## 2021-01-26 LAB — SARS-COV-2 RNA RESP QL NAA+PROBE: NOT DETECTED

## 2021-01-26 RX ORDER — CIPROFLOXACIN 500 MG/1
500 TABLET ORAL 2 TIMES DAILY
Qty: 10 TABLET | Refills: 0 | Status: SHIPPED | OUTPATIENT
Start: 2021-01-26 | End: 2021-02-03 | Stop reason: SDUPTHER

## 2021-01-27 ENCOUNTER — ANESTHESIA EVENT (OUTPATIENT)
Dept: SURGERY | Facility: HOSPITAL | Age: 66
End: 2021-01-27
Payer: MEDICARE

## 2021-01-27 LAB — BACTERIA UR CULT: NORMAL

## 2021-01-28 ENCOUNTER — HOSPITAL ENCOUNTER (OUTPATIENT)
Facility: HOSPITAL | Age: 66
Discharge: HOME | End: 2021-01-28
Attending: UROLOGY | Admitting: UROLOGY
Payer: MEDICARE

## 2021-01-28 ENCOUNTER — ANESTHESIA (OUTPATIENT)
Dept: SURGERY | Facility: HOSPITAL | Age: 66
End: 2021-01-28
Payer: MEDICARE

## 2021-01-28 DIAGNOSIS — N20.1 LEFT URETERAL CALCULUS: ICD-10-CM

## 2021-01-28 DIAGNOSIS — Z85.51 HISTORY OF BLADDER CANCER: Primary | ICD-10-CM

## 2021-01-28 PROCEDURE — 25000003 PHARM REV CODE 250: Performed by: ANESTHESIOLOGY

## 2021-01-28 PROCEDURE — C1894 INTRO/SHEATH, NON-LASER: HCPCS | Performed by: UROLOGY

## 2021-01-28 PROCEDURE — 36000707: Performed by: UROLOGY

## 2021-01-28 PROCEDURE — 37000008 HC ANESTHESIA 1ST 15 MINUTES: Performed by: UROLOGY

## 2021-01-28 PROCEDURE — C1729 CATH, DRAINAGE: HCPCS | Performed by: UROLOGY

## 2021-01-28 PROCEDURE — 93010 EKG 12-LEAD: ICD-10-PCS | Mod: ,,, | Performed by: SPECIALIST

## 2021-01-28 PROCEDURE — 99900103 DSU ONLY-NO CHARGE-INITIAL HR (STAT): Performed by: UROLOGY

## 2021-01-28 PROCEDURE — 25000003 PHARM REV CODE 250: Performed by: NURSE ANESTHETIST, CERTIFIED REGISTERED

## 2021-01-28 PROCEDURE — 74420 UROGRAPHY RTRGR +-KUB: CPT | Mod: 26,,, | Performed by: UROLOGY

## 2021-01-28 PROCEDURE — 52356 CYSTO/URETERO W/LITHOTRIPSY: CPT | Mod: LT,,, | Performed by: UROLOGY

## 2021-01-28 PROCEDURE — 27200651 HC AIRWAY, LMA: Performed by: ANESTHESIOLOGY

## 2021-01-28 PROCEDURE — 63600175 PHARM REV CODE 636 W HCPCS: Performed by: NURSE ANESTHETIST, CERTIFIED REGISTERED

## 2021-01-28 PROCEDURE — 63600175 PHARM REV CODE 636 W HCPCS: Performed by: ANESTHESIOLOGY

## 2021-01-28 PROCEDURE — 74420 PR  X-RAY RETROGRADE PYELOGRAM: ICD-10-PCS | Mod: 26,,, | Performed by: UROLOGY

## 2021-01-28 PROCEDURE — D9220A PRA ANESTHESIA: Mod: CRNA,,, | Performed by: NURSE ANESTHETIST, CERTIFIED REGISTERED

## 2021-01-28 PROCEDURE — 99900103 DSU ONLY-NO CHARGE-INITIAL HR (STAT)

## 2021-01-28 PROCEDURE — C1769 GUIDE WIRE: HCPCS | Performed by: UROLOGY

## 2021-01-28 PROCEDURE — D9220A PRA ANESTHESIA: ICD-10-PCS | Mod: CRNA,,, | Performed by: NURSE ANESTHETIST, CERTIFIED REGISTERED

## 2021-01-28 PROCEDURE — 71000039 HC RECOVERY, EACH ADD'L HOUR: Performed by: UROLOGY

## 2021-01-28 PROCEDURE — 99900104 DSU ONLY-NO CHARGE-EA ADD'L HR (STAT): Performed by: UROLOGY

## 2021-01-28 PROCEDURE — 27201423 OPTIME MED/SURG SUP & DEVICES STERILE SUPPLY: Performed by: UROLOGY

## 2021-01-28 PROCEDURE — 71000015 HC POSTOP RECOV 1ST HR: Performed by: UROLOGY

## 2021-01-28 PROCEDURE — 93005 ELECTROCARDIOGRAM TRACING: CPT

## 2021-01-28 PROCEDURE — 52356 PR CYSTO/URETERO W/LITHOTRIPSY: ICD-10-PCS | Mod: LT,,, | Performed by: UROLOGY

## 2021-01-28 PROCEDURE — 37000009 HC ANESTHESIA EA ADD 15 MINS: Performed by: UROLOGY

## 2021-01-28 PROCEDURE — C2617 STENT, NON-COR, TEM W/O DEL: HCPCS | Performed by: UROLOGY

## 2021-01-28 PROCEDURE — C1758 CATHETER, URETERAL: HCPCS | Performed by: UROLOGY

## 2021-01-28 PROCEDURE — 36000706: Performed by: UROLOGY

## 2021-01-28 PROCEDURE — 71000033 HC RECOVERY, INTIAL HOUR: Performed by: UROLOGY

## 2021-01-28 PROCEDURE — D9220A PRA ANESTHESIA: Mod: ANES,,, | Performed by: ANESTHESIOLOGY

## 2021-01-28 PROCEDURE — 93010 ELECTROCARDIOGRAM REPORT: CPT | Mod: ,,, | Performed by: SPECIALIST

## 2021-01-28 PROCEDURE — 25500020 PHARM REV CODE 255: Performed by: UROLOGY

## 2021-01-28 PROCEDURE — D9220A PRA ANESTHESIA: ICD-10-PCS | Mod: ANES,,, | Performed by: ANESTHESIOLOGY

## 2021-01-28 PROCEDURE — 63600175 PHARM REV CODE 636 W HCPCS: Performed by: UROLOGY

## 2021-01-28 DEVICE — STENT SET URETERAL 6X28CM
Type: IMPLANTABLE DEVICE | Site: URETER | Status: NON-FUNCTIONAL
Removed: 2021-02-11

## 2021-01-28 RX ORDER — MIDAZOLAM HYDROCHLORIDE 1 MG/ML
INJECTION INTRAMUSCULAR; INTRAVENOUS
Status: DISCONTINUED | OUTPATIENT
Start: 2021-01-28 | End: 2021-01-28

## 2021-01-28 RX ORDER — ONDANSETRON HYDROCHLORIDE 2 MG/ML
INJECTION, SOLUTION INTRAMUSCULAR; INTRAVENOUS
Status: DISCONTINUED | OUTPATIENT
Start: 2021-01-28 | End: 2021-01-28

## 2021-01-28 RX ORDER — CIPROFLOXACIN 2 MG/ML
400 INJECTION, SOLUTION INTRAVENOUS
Status: COMPLETED | OUTPATIENT
Start: 2021-01-28 | End: 2021-01-28

## 2021-01-28 RX ORDER — PHENYLEPHRINE HYDROCHLORIDE 10 MG/ML
INJECTION INTRAVENOUS
Status: DISCONTINUED | OUTPATIENT
Start: 2021-01-28 | End: 2021-01-28

## 2021-01-28 RX ORDER — ACETAMINOPHEN 10 MG/ML
INJECTION, SOLUTION INTRAVENOUS
Status: DISCONTINUED | OUTPATIENT
Start: 2021-01-28 | End: 2021-01-28

## 2021-01-28 RX ORDER — DEXAMETHASONE SODIUM PHOSPHATE 4 MG/ML
INJECTION, SOLUTION INTRA-ARTICULAR; INTRALESIONAL; INTRAMUSCULAR; INTRAVENOUS; SOFT TISSUE
Status: DISCONTINUED | OUTPATIENT
Start: 2021-01-28 | End: 2021-01-28

## 2021-01-28 RX ORDER — ONDANSETRON 2 MG/ML
4 INJECTION INTRAMUSCULAR; INTRAVENOUS ONCE AS NEEDED
Status: DISCONTINUED | OUTPATIENT
Start: 2021-01-28 | End: 2022-09-22

## 2021-01-28 RX ORDER — FENTANYL CITRATE 50 UG/ML
25 INJECTION, SOLUTION INTRAMUSCULAR; INTRAVENOUS EVERY 5 MIN PRN
Status: COMPLETED | OUTPATIENT
Start: 2021-01-28 | End: 2021-01-28

## 2021-01-28 RX ORDER — PROPOFOL 10 MG/ML
VIAL (ML) INTRAVENOUS
Status: DISCONTINUED | OUTPATIENT
Start: 2021-01-28 | End: 2021-01-28

## 2021-01-28 RX ORDER — LIDOCAINE HYDROCHLORIDE 10 MG/ML
1 INJECTION, SOLUTION EPIDURAL; INFILTRATION; INTRACAUDAL; PERINEURAL ONCE
Status: COMPLETED | OUTPATIENT
Start: 2021-01-28 | End: 2021-01-28

## 2021-01-28 RX ORDER — LIDOCAINE HCL/PF 100 MG/5ML
SYRINGE (ML) INTRAVENOUS
Status: DISCONTINUED | OUTPATIENT
Start: 2021-01-28 | End: 2021-01-28

## 2021-01-28 RX ORDER — FENTANYL CITRATE 50 UG/ML
INJECTION, SOLUTION INTRAMUSCULAR; INTRAVENOUS
Status: DISCONTINUED | OUTPATIENT
Start: 2021-01-28 | End: 2021-01-28

## 2021-01-28 RX ORDER — OXYCODONE HYDROCHLORIDE 5 MG/1
5 TABLET ORAL ONCE AS NEEDED
Status: COMPLETED | OUTPATIENT
Start: 2021-01-28 | End: 2021-01-28

## 2021-01-28 RX ADMIN — PHENYLEPHRINE HYDROCHLORIDE 100 MCG: 10 INJECTION INTRAVENOUS at 03:01

## 2021-01-28 RX ADMIN — FENTANYL CITRATE 25 MCG: 0.05 INJECTION, SOLUTION INTRAMUSCULAR; INTRAVENOUS at 05:01

## 2021-01-28 RX ADMIN — FENTANYL CITRATE 25 MCG: 0.05 INJECTION, SOLUTION INTRAMUSCULAR; INTRAVENOUS at 03:01

## 2021-01-28 RX ADMIN — OXYCODONE HYDROCHLORIDE 5 MG: 5 TABLET ORAL at 05:01

## 2021-01-28 RX ADMIN — LIDOCAINE HYDROCHLORIDE 10 MG: 10 INJECTION, SOLUTION EPIDURAL; INFILTRATION; INTRACAUDAL; PERINEURAL at 12:01

## 2021-01-28 RX ADMIN — MIDAZOLAM HYDROCHLORIDE 2 MG: 1 INJECTION, SOLUTION INTRAMUSCULAR; INTRAVENOUS at 02:01

## 2021-01-28 RX ADMIN — DEXAMETHASONE SODIUM PHOSPHATE 4 MG: 4 INJECTION, SOLUTION INTRA-ARTICULAR; INTRALESIONAL; INTRAMUSCULAR; INTRAVENOUS; SOFT TISSUE at 03:01

## 2021-01-28 RX ADMIN — ONDANSETRON 4 MG: 2 INJECTION, SOLUTION INTRAMUSCULAR; INTRAVENOUS at 03:01

## 2021-01-28 RX ADMIN — SODIUM CHLORIDE, SODIUM GLUCONATE, SODIUM ACETATE, POTASSIUM CHLORIDE, MAGNESIUM CHLORIDE, SODIUM PHOSPHATE, DIBASIC, AND POTASSIUM PHOSPHATE: .53; .5; .37; .037; .03; .012; .00082 INJECTION, SOLUTION INTRAVENOUS at 03:01

## 2021-01-28 RX ADMIN — FENTANYL CITRATE 100 MCG: 0.05 INJECTION, SOLUTION INTRAMUSCULAR; INTRAVENOUS at 02:01

## 2021-01-28 RX ADMIN — SODIUM CHLORIDE, SODIUM GLUCONATE, SODIUM ACETATE, POTASSIUM CHLORIDE, MAGNESIUM CHLORIDE, SODIUM PHOSPHATE, DIBASIC, AND POTASSIUM PHOSPHATE: .53; .5; .37; .037; .03; .012; .00082 INJECTION, SOLUTION INTRAVENOUS at 12:01

## 2021-01-28 RX ADMIN — OXYCODONE HYDROCHLORIDE 5 MG: 5 TABLET ORAL at 04:01

## 2021-01-28 RX ADMIN — ACETAMINOPHEN 1000 MG: 10 INJECTION, SOLUTION INTRAVENOUS at 03:01

## 2021-01-28 RX ADMIN — CIPROFLOXACIN 400 MG: 2 INJECTION INTRAVENOUS at 02:01

## 2021-01-28 RX ADMIN — LIDOCAINE HYDROCHLORIDE 100 MG: 20 INJECTION, SOLUTION INTRAVENOUS at 02:01

## 2021-01-28 RX ADMIN — MEPERIDINE HYDROCHLORIDE 12.5 MG: 100 INJECTION, SOLUTION INTRAMUSCULAR; INTRAVENOUS; SUBCUTANEOUS at 04:01

## 2021-01-28 RX ADMIN — PROPOFOL 140 MG: 10 INJECTION, EMULSION INTRAVENOUS at 02:01

## 2021-01-29 ENCOUNTER — PATIENT MESSAGE (OUTPATIENT)
Dept: UROLOGY | Facility: CLINIC | Age: 66
End: 2021-01-29

## 2021-01-29 DIAGNOSIS — N20.1 LEFT URETERAL CALCULUS: Primary | ICD-10-CM

## 2021-01-30 ENCOUNTER — PATIENT MESSAGE (OUTPATIENT)
Dept: UROLOGY | Facility: CLINIC | Age: 66
End: 2021-01-30

## 2021-02-01 VITALS
HEART RATE: 78 BPM | TEMPERATURE: 98 F | BODY MASS INDEX: 25.48 KG/M2 | RESPIRATION RATE: 16 BRPM | WEIGHT: 172 LBS | OXYGEN SATURATION: 95 % | SYSTOLIC BLOOD PRESSURE: 169 MMHG | HEIGHT: 69 IN | DIASTOLIC BLOOD PRESSURE: 93 MMHG

## 2021-02-02 ENCOUNTER — HOSPITAL ENCOUNTER (OUTPATIENT)
Dept: RADIOLOGY | Facility: HOSPITAL | Age: 66
Discharge: HOME | End: 2021-02-02
Attending: UROLOGY
Payer: MEDICARE

## 2021-02-02 ENCOUNTER — PATIENT MESSAGE (OUTPATIENT)
Dept: UROLOGY | Facility: CLINIC | Age: 66
End: 2021-02-02

## 2021-02-02 DIAGNOSIS — N20.1 LEFT URETERAL CALCULUS: ICD-10-CM

## 2021-02-02 PROCEDURE — 74018 RADEX ABDOMEN 1 VIEW: CPT | Mod: 26,,, | Performed by: RADIOLOGY

## 2021-02-02 PROCEDURE — 74018 XR ABDOMEN AP 1 VIEW: ICD-10-PCS | Mod: 26,,, | Performed by: RADIOLOGY

## 2021-02-02 PROCEDURE — 74018 RADEX ABDOMEN 1 VIEW: CPT | Mod: TC,FY

## 2021-02-03 ENCOUNTER — PATIENT MESSAGE (OUTPATIENT)
Dept: UROLOGY | Facility: CLINIC | Age: 66
End: 2021-02-03

## 2021-02-03 RX ORDER — CIPROFLOXACIN 500 MG/1
500 TABLET ORAL 2 TIMES DAILY
Qty: 10 TABLET | Refills: 0 | Status: SHIPPED | OUTPATIENT
Start: 2021-02-03 | End: 2021-02-20

## 2021-02-04 DIAGNOSIS — N20.1 LEFT URETERAL CALCULUS: Primary | ICD-10-CM

## 2021-02-04 RX ORDER — CIPROFLOXACIN 2 MG/ML
400 INJECTION, SOLUTION INTRAVENOUS
Status: CANCELLED | OUTPATIENT
Start: 2021-02-04

## 2021-02-08 ENCOUNTER — LAB VISIT (OUTPATIENT)
Dept: PRIMARY CARE CLINIC | Facility: CLINIC | Age: 66
End: 2021-02-08
Payer: MEDICARE

## 2021-02-08 DIAGNOSIS — N20.1 LEFT URETERAL CALCULUS: ICD-10-CM

## 2021-02-08 PROCEDURE — U0003 INFECTIOUS AGENT DETECTION BY NUCLEIC ACID (DNA OR RNA); SEVERE ACUTE RESPIRATORY SYNDROME CORONAVIRUS 2 (SARS-COV-2) (CORONAVIRUS DISEASE [COVID-19]), AMPLIFIED PROBE TECHNIQUE, MAKING USE OF HIGH THROUGHPUT TECHNOLOGIES AS DESCRIBED BY CMS-2020-01-R: HCPCS

## 2021-02-08 PROCEDURE — U0005 INFEC AGEN DETEC AMPLI PROBE: HCPCS

## 2021-02-09 LAB — SARS-COV-2 RNA RESP QL NAA+PROBE: NOT DETECTED

## 2021-02-10 ENCOUNTER — PATIENT MESSAGE (OUTPATIENT)
Dept: SURGERY | Facility: HOSPITAL | Age: 66
End: 2021-02-10

## 2021-02-10 ENCOUNTER — ANESTHESIA EVENT (OUTPATIENT)
Dept: SURGERY | Facility: HOSPITAL | Age: 66
End: 2021-02-10
Payer: MEDICARE

## 2021-02-11 ENCOUNTER — ANESTHESIA (OUTPATIENT)
Dept: SURGERY | Facility: HOSPITAL | Age: 66
End: 2021-02-11
Payer: MEDICARE

## 2021-02-11 ENCOUNTER — HOSPITAL ENCOUNTER (OUTPATIENT)
Facility: HOSPITAL | Age: 66
Discharge: HOME | End: 2021-02-11
Attending: UROLOGY | Admitting: UROLOGY
Payer: MEDICARE

## 2021-02-11 DIAGNOSIS — N20.1 LEFT URETERAL CALCULUS: ICD-10-CM

## 2021-02-11 PROCEDURE — C1769 GUIDE WIRE: HCPCS | Performed by: UROLOGY

## 2021-02-11 PROCEDURE — 82365 CALCULUS SPECTROSCOPY: CPT

## 2021-02-11 PROCEDURE — 74420 PR  X-RAY RETROGRADE PYELOGRAM: ICD-10-PCS | Mod: 26,,, | Performed by: UROLOGY

## 2021-02-11 PROCEDURE — 99900103 DSU ONLY-NO CHARGE-INITIAL HR (STAT): Performed by: UROLOGY

## 2021-02-11 PROCEDURE — 36000707: Performed by: UROLOGY

## 2021-02-11 PROCEDURE — 27201423 OPTIME MED/SURG SUP & DEVICES STERILE SUPPLY: Performed by: UROLOGY

## 2021-02-11 PROCEDURE — 71000015 HC POSTOP RECOV 1ST HR: Performed by: UROLOGY

## 2021-02-11 PROCEDURE — D9220A PRA ANESTHESIA: ICD-10-PCS | Mod: ANES,,, | Performed by: ANESTHESIOLOGY

## 2021-02-11 PROCEDURE — 99900103 DSU ONLY-NO CHARGE-INITIAL HR (STAT)

## 2021-02-11 PROCEDURE — 25000003 PHARM REV CODE 250: Performed by: ANESTHESIOLOGY

## 2021-02-11 PROCEDURE — 37000008 HC ANESTHESIA 1ST 15 MINUTES: Performed by: UROLOGY

## 2021-02-11 PROCEDURE — 63600175 PHARM REV CODE 636 W HCPCS: Performed by: NURSE ANESTHETIST, CERTIFIED REGISTERED

## 2021-02-11 PROCEDURE — 37000009 HC ANESTHESIA EA ADD 15 MINS: Performed by: UROLOGY

## 2021-02-11 PROCEDURE — 99900104 DSU ONLY-NO CHARGE-EA ADD'L HR (STAT): Performed by: UROLOGY

## 2021-02-11 PROCEDURE — D9220A PRA ANESTHESIA: Mod: CRNA,,, | Performed by: NURSE ANESTHETIST, CERTIFIED REGISTERED

## 2021-02-11 PROCEDURE — 63600175 PHARM REV CODE 636 W HCPCS: Performed by: UROLOGY

## 2021-02-11 PROCEDURE — 74420 UROGRAPHY RTRGR +-KUB: CPT | Mod: 26,,, | Performed by: UROLOGY

## 2021-02-11 PROCEDURE — 52356 PR CYSTO/URETERO W/LITHOTRIPSY: ICD-10-PCS | Mod: LT,,, | Performed by: UROLOGY

## 2021-02-11 PROCEDURE — 36000706: Performed by: UROLOGY

## 2021-02-11 PROCEDURE — 25000003 PHARM REV CODE 250: Performed by: NURSE ANESTHETIST, CERTIFIED REGISTERED

## 2021-02-11 PROCEDURE — 71000039 HC RECOVERY, EACH ADD'L HOUR: Performed by: UROLOGY

## 2021-02-11 PROCEDURE — D9220A PRA ANESTHESIA: Mod: ANES,,, | Performed by: ANESTHESIOLOGY

## 2021-02-11 PROCEDURE — 71000033 HC RECOVERY, INTIAL HOUR: Performed by: UROLOGY

## 2021-02-11 PROCEDURE — 52356 CYSTO/URETERO W/LITHOTRIPSY: CPT | Mod: LT,,, | Performed by: UROLOGY

## 2021-02-11 PROCEDURE — C2617 STENT, NON-COR, TEM W/O DEL: HCPCS | Performed by: UROLOGY

## 2021-02-11 PROCEDURE — 25500020 PHARM REV CODE 255: Performed by: UROLOGY

## 2021-02-11 PROCEDURE — D9220A PRA ANESTHESIA: ICD-10-PCS | Mod: CRNA,,, | Performed by: NURSE ANESTHETIST, CERTIFIED REGISTERED

## 2021-02-11 PROCEDURE — 71000016 HC POSTOP RECOV ADDL HR: Performed by: UROLOGY

## 2021-02-11 DEVICE — STENT SET URETERAL 6X28CM: Type: IMPLANTABLE DEVICE | Site: URETER | Status: FUNCTIONAL

## 2021-02-11 RX ORDER — DEXAMETHASONE SODIUM PHOSPHATE 4 MG/ML
INJECTION, SOLUTION INTRA-ARTICULAR; INTRALESIONAL; INTRAMUSCULAR; INTRAVENOUS; SOFT TISSUE
Status: DISCONTINUED | OUTPATIENT
Start: 2021-02-11 | End: 2021-02-11

## 2021-02-11 RX ORDER — FENTANYL CITRATE 50 UG/ML
INJECTION, SOLUTION INTRAMUSCULAR; INTRAVENOUS
Status: DISCONTINUED | OUTPATIENT
Start: 2021-02-11 | End: 2021-02-11

## 2021-02-11 RX ORDER — ROCURONIUM BROMIDE 10 MG/ML
INJECTION, SOLUTION INTRAVENOUS
Status: DISCONTINUED | OUTPATIENT
Start: 2021-02-11 | End: 2021-02-11

## 2021-02-11 RX ORDER — FENTANYL CITRATE 50 UG/ML
25 INJECTION, SOLUTION INTRAMUSCULAR; INTRAVENOUS EVERY 5 MIN PRN
Status: DISCONTINUED | OUTPATIENT
Start: 2021-02-11 | End: 2021-02-11 | Stop reason: HOSPADM

## 2021-02-11 RX ORDER — EPHEDRINE SULFATE 50 MG/ML
INJECTION, SOLUTION INTRAVENOUS
Status: DISCONTINUED | OUTPATIENT
Start: 2021-02-11 | End: 2021-02-11

## 2021-02-11 RX ORDER — SUCCINYLCHOLINE CHLORIDE 20 MG/ML
INJECTION INTRAMUSCULAR; INTRAVENOUS
Status: DISCONTINUED | OUTPATIENT
Start: 2021-02-11 | End: 2021-02-11

## 2021-02-11 RX ORDER — LIDOCAINE HYDROCHLORIDE 10 MG/ML
1 INJECTION, SOLUTION EPIDURAL; INFILTRATION; INTRACAUDAL; PERINEURAL ONCE
Status: DISCONTINUED | OUTPATIENT
Start: 2021-02-11 | End: 2021-02-11 | Stop reason: HOSPADM

## 2021-02-11 RX ORDER — MIDAZOLAM HYDROCHLORIDE 1 MG/ML
INJECTION INTRAMUSCULAR; INTRAVENOUS
Status: DISCONTINUED | OUTPATIENT
Start: 2021-02-11 | End: 2021-02-11

## 2021-02-11 RX ORDER — CIPROFLOXACIN 2 MG/ML
400 INJECTION, SOLUTION INTRAVENOUS
Status: COMPLETED | OUTPATIENT
Start: 2021-02-11 | End: 2021-02-11

## 2021-02-11 RX ORDER — SODIUM CHLORIDE, SODIUM LACTATE, POTASSIUM CHLORIDE, CALCIUM CHLORIDE 600; 310; 30; 20 MG/100ML; MG/100ML; MG/100ML; MG/100ML
INJECTION, SOLUTION INTRAVENOUS CONTINUOUS
Status: DISCONTINUED | OUTPATIENT
Start: 2021-02-11 | End: 2021-02-11 | Stop reason: HOSPADM

## 2021-02-11 RX ORDER — LIDOCAINE HCL/PF 100 MG/5ML
SYRINGE (ML) INTRAVENOUS
Status: DISCONTINUED | OUTPATIENT
Start: 2021-02-11 | End: 2021-02-11

## 2021-02-11 RX ORDER — GENTAMICIN SULFATE 80 MG/100ML
80 INJECTION, SOLUTION INTRAVENOUS ONCE
Status: COMPLETED | OUTPATIENT
Start: 2021-02-11 | End: 2021-02-11

## 2021-02-11 RX ORDER — ONDANSETRON HYDROCHLORIDE 2 MG/ML
INJECTION, SOLUTION INTRAMUSCULAR; INTRAVENOUS
Status: DISCONTINUED | OUTPATIENT
Start: 2021-02-11 | End: 2021-02-11

## 2021-02-11 RX ORDER — PROPOFOL 10 MG/ML
VIAL (ML) INTRAVENOUS
Status: DISCONTINUED | OUTPATIENT
Start: 2021-02-11 | End: 2021-02-11

## 2021-02-11 RX ORDER — OXYCODONE HYDROCHLORIDE 5 MG/1
5 TABLET ORAL ONCE AS NEEDED
Status: DISCONTINUED | OUTPATIENT
Start: 2021-02-11 | End: 2021-02-11 | Stop reason: HOSPADM

## 2021-02-11 RX ADMIN — LIDOCAINE HYDROCHLORIDE 100 MG: 20 INJECTION, SOLUTION INTRAVENOUS at 11:02

## 2021-02-11 RX ADMIN — MIDAZOLAM HYDROCHLORIDE 2 MG: 1 INJECTION, SOLUTION INTRAMUSCULAR; INTRAVENOUS at 11:02

## 2021-02-11 RX ADMIN — ROCURONIUM BROMIDE 5 MG: 10 INJECTION, SOLUTION INTRAVENOUS at 11:02

## 2021-02-11 RX ADMIN — SODIUM CHLORIDE, SODIUM GLUCONATE, SODIUM ACETATE, POTASSIUM CHLORIDE, MAGNESIUM CHLORIDE, SODIUM PHOSPHATE, DIBASIC, AND POTASSIUM PHOSPHATE: .53; .5; .37; .037; .03; .012; .00082 INJECTION, SOLUTION INTRAVENOUS at 11:02

## 2021-02-11 RX ADMIN — CIPROFLOXACIN 400 MG: 2 INJECTION INTRAVENOUS at 11:02

## 2021-02-11 RX ADMIN — GENTAMICIN SULFATE 80 MG: 80 INJECTION, SOLUTION INTRAVENOUS at 11:02

## 2021-02-11 RX ADMIN — PROPOFOL 150 MG: 10 INJECTION, EMULSION INTRAVENOUS at 11:02

## 2021-02-11 RX ADMIN — ONDANSETRON 4 MG: 2 INJECTION, SOLUTION INTRAMUSCULAR; INTRAVENOUS at 11:02

## 2021-02-11 RX ADMIN — EPHEDRINE SULFATE 25 MG: 50 INJECTION, SOLUTION INTRAMUSCULAR; INTRAVENOUS; SUBCUTANEOUS at 11:02

## 2021-02-11 RX ADMIN — SUCCINYLCHOLINE CHLORIDE 160 MG: 20 INJECTION, SOLUTION INTRAMUSCULAR; INTRAVENOUS; PARENTERAL at 11:02

## 2021-02-11 RX ADMIN — DEXAMETHASONE SODIUM PHOSPHATE 4 MG: 4 INJECTION, SOLUTION INTRA-ARTICULAR; INTRALESIONAL; INTRAMUSCULAR; INTRAVENOUS; SOFT TISSUE at 11:02

## 2021-02-11 RX ADMIN — FENTANYL CITRATE 100 MCG: 50 INJECTION, SOLUTION INTRAMUSCULAR; INTRAVENOUS at 11:02

## 2021-02-11 RX ADMIN — EPHEDRINE SULFATE 25 MG: 50 INJECTION, SOLUTION INTRAMUSCULAR; INTRAVENOUS; SUBCUTANEOUS at 12:02

## 2021-02-12 ENCOUNTER — PATIENT MESSAGE (OUTPATIENT)
Dept: SURGERY | Facility: AMBULARY SURGERY CENTER | Age: 66
End: 2021-02-12

## 2021-02-12 VITALS
WEIGHT: 172 LBS | HEIGHT: 69 IN | RESPIRATION RATE: 16 BRPM | DIASTOLIC BLOOD PRESSURE: 70 MMHG | OXYGEN SATURATION: 96 % | BODY MASS INDEX: 25.48 KG/M2 | HEART RATE: 95 BPM | TEMPERATURE: 98 F | SYSTOLIC BLOOD PRESSURE: 152 MMHG

## 2021-02-12 DIAGNOSIS — Z20.822 ENCOUNTER FOR LABORATORY TESTING FOR COVID-19 VIRUS: ICD-10-CM

## 2021-02-17 LAB
COMPN STONE: NORMAL
SPECIMEN SOURCE: NORMAL
STONE ANALYSIS IR-IMP: NORMAL

## 2021-02-20 ENCOUNTER — PATIENT MESSAGE (OUTPATIENT)
Dept: SURGERY | Facility: AMBULARY SURGERY CENTER | Age: 66
End: 2021-02-20

## 2021-02-20 RX ORDER — CIPROFLOXACIN 500 MG/1
500 TABLET ORAL 2 TIMES DAILY
Qty: 4 TABLET | Refills: 0 | Status: SHIPPED | OUTPATIENT
Start: 2021-02-20 | End: 2021-06-10

## 2021-02-27 ENCOUNTER — LAB VISIT (OUTPATIENT)
Dept: PRIMARY CARE CLINIC | Facility: CLINIC | Age: 66
End: 2021-02-27
Payer: MEDICARE

## 2021-02-27 DIAGNOSIS — Z20.822 ENCOUNTER FOR LABORATORY TESTING FOR COVID-19 VIRUS: ICD-10-CM

## 2021-02-27 LAB — SARS-COV-2 RNA RESP QL NAA+PROBE: NOT DETECTED

## 2021-02-27 PROCEDURE — U0005 INFEC AGEN DETEC AMPLI PROBE: HCPCS

## 2021-02-27 PROCEDURE — U0003 INFECTIOUS AGENT DETECTION BY NUCLEIC ACID (DNA OR RNA); SEVERE ACUTE RESPIRATORY SYNDROME CORONAVIRUS 2 (SARS-COV-2) (CORONAVIRUS DISEASE [COVID-19]), AMPLIFIED PROBE TECHNIQUE, MAKING USE OF HIGH THROUGHPUT TECHNOLOGIES AS DESCRIBED BY CMS-2020-01-R: HCPCS

## 2021-03-02 ENCOUNTER — HOSPITAL ENCOUNTER (OUTPATIENT)
Facility: AMBULARY SURGERY CENTER | Age: 66
Discharge: HOME | End: 2021-03-02
Attending: UROLOGY | Admitting: UROLOGY
Payer: MEDICARE

## 2021-03-02 ENCOUNTER — PATIENT MESSAGE (OUTPATIENT)
Dept: SURGERY | Facility: AMBULARY SURGERY CENTER | Age: 66
End: 2021-03-02

## 2021-03-02 DIAGNOSIS — Z85.51 HISTORY OF BLADDER CANCER: Primary | ICD-10-CM

## 2021-03-02 DIAGNOSIS — N20.1 LEFT URETERAL CALCULUS: ICD-10-CM

## 2021-03-02 PROCEDURE — 52310 PR CYSTOSCOPY,REMV CALCULUS,SIMPLE: ICD-10-PCS | Mod: ,,, | Performed by: UROLOGY

## 2021-03-02 PROCEDURE — 52310 CYSTOSCOPY AND TREATMENT: CPT | Performed by: UROLOGY

## 2021-03-02 PROCEDURE — 52310 CYSTOSCOPY AND TREATMENT: CPT | Mod: ,,, | Performed by: UROLOGY

## 2021-03-02 RX ORDER — WATER 1 ML/ML
IRRIGANT IRRIGATION
Status: DISCONTINUED | OUTPATIENT
Start: 2021-03-02 | End: 2021-03-02 | Stop reason: HOSPADM

## 2021-03-02 RX ORDER — LIDOCAINE HYDROCHLORIDE 20 MG/ML
JELLY TOPICAL
Status: DISCONTINUED | OUTPATIENT
Start: 2021-03-02 | End: 2021-03-02 | Stop reason: HOSPADM

## 2021-03-02 RX ORDER — LIDOCAINE HYDROCHLORIDE 20 MG/ML
JELLY TOPICAL ONCE
Status: CANCELLED | OUTPATIENT
Start: 2021-03-02 | End: 2021-03-02

## 2021-03-03 VITALS
HEART RATE: 76 BPM | OXYGEN SATURATION: 98 % | DIASTOLIC BLOOD PRESSURE: 78 MMHG | BODY MASS INDEX: 25.49 KG/M2 | SYSTOLIC BLOOD PRESSURE: 146 MMHG | RESPIRATION RATE: 18 BRPM | TEMPERATURE: 97 F | WEIGHT: 172.63 LBS

## 2021-04-05 ENCOUNTER — PATIENT MESSAGE (OUTPATIENT)
Dept: ADMINISTRATIVE | Facility: HOSPITAL | Age: 66
End: 2021-04-05

## 2021-05-04 ENCOUNTER — PES CALL (OUTPATIENT)
Dept: ADMINISTRATIVE | Facility: CLINIC | Age: 66
End: 2021-05-04

## 2021-06-09 ENCOUNTER — TELEPHONE (OUTPATIENT)
Dept: FAMILY MEDICINE | Facility: CLINIC | Age: 66
End: 2021-06-09

## 2021-06-09 ENCOUNTER — PATIENT MESSAGE (OUTPATIENT)
Dept: FAMILY MEDICINE | Facility: CLINIC | Age: 66
End: 2021-06-09

## 2021-06-10 ENCOUNTER — OFFICE VISIT (OUTPATIENT)
Dept: FAMILY MEDICINE | Facility: CLINIC | Age: 66
End: 2021-06-10
Payer: MEDICARE

## 2021-06-10 VITALS
HEART RATE: 67 BPM | RESPIRATION RATE: 14 BRPM | WEIGHT: 173.06 LBS | BODY MASS INDEX: 25.63 KG/M2 | TEMPERATURE: 98 F | SYSTOLIC BLOOD PRESSURE: 120 MMHG | DIASTOLIC BLOOD PRESSURE: 80 MMHG | OXYGEN SATURATION: 99 % | HEIGHT: 69 IN

## 2021-06-10 DIAGNOSIS — L42 PITYRIASIS ROSEA: Primary | ICD-10-CM

## 2021-06-10 PROCEDURE — 1159F PR MEDICATION LIST DOCUMENTED IN MEDICAL RECORD: ICD-10-PCS | Mod: HCNC,S$GLB,, | Performed by: FAMILY MEDICINE

## 2021-06-10 PROCEDURE — 3288F PR FALLS RISK ASSESSMENT DOCUMENTED: ICD-10-PCS | Mod: HCNC,CPTII,S$GLB, | Performed by: FAMILY MEDICINE

## 2021-06-10 PROCEDURE — 1126F AMNT PAIN NOTED NONE PRSNT: CPT | Mod: HCNC,S$GLB,, | Performed by: FAMILY MEDICINE

## 2021-06-10 PROCEDURE — 99213 PR OFFICE/OUTPT VISIT, EST, LEVL III, 20-29 MIN: ICD-10-PCS | Mod: HCNC,S$GLB,, | Performed by: FAMILY MEDICINE

## 2021-06-10 PROCEDURE — 3288F FALL RISK ASSESSMENT DOCD: CPT | Mod: HCNC,CPTII,S$GLB, | Performed by: FAMILY MEDICINE

## 2021-06-10 PROCEDURE — 3008F PR BODY MASS INDEX (BMI) DOCUMENTED: ICD-10-PCS | Mod: HCNC,CPTII,S$GLB, | Performed by: FAMILY MEDICINE

## 2021-06-10 PROCEDURE — 99999 PR PBB SHADOW E&M-EST. PATIENT-LVL IV: CPT | Mod: PBBFAC,HCNC,, | Performed by: FAMILY MEDICINE

## 2021-06-10 PROCEDURE — 1159F MED LIST DOCD IN RCRD: CPT | Mod: HCNC,S$GLB,, | Performed by: FAMILY MEDICINE

## 2021-06-10 PROCEDURE — 1126F PR PAIN SEVERITY QUANTIFIED, NO PAIN PRESENT: ICD-10-PCS | Mod: HCNC,S$GLB,, | Performed by: FAMILY MEDICINE

## 2021-06-10 PROCEDURE — 99213 OFFICE O/P EST LOW 20 MIN: CPT | Mod: HCNC,S$GLB,, | Performed by: FAMILY MEDICINE

## 2021-06-10 PROCEDURE — 1101F PT FALLS ASSESS-DOCD LE1/YR: CPT | Mod: HCNC,CPTII,S$GLB, | Performed by: FAMILY MEDICINE

## 2021-06-10 PROCEDURE — 99999 PR PBB SHADOW E&M-EST. PATIENT-LVL IV: ICD-10-PCS | Mod: PBBFAC,HCNC,, | Performed by: FAMILY MEDICINE

## 2021-06-10 PROCEDURE — 3008F BODY MASS INDEX DOCD: CPT | Mod: HCNC,CPTII,S$GLB, | Performed by: FAMILY MEDICINE

## 2021-06-10 PROCEDURE — 1101F PR PT FALLS ASSESS DOC 0-1 FALLS W/OUT INJ PAST YR: ICD-10-PCS | Mod: HCNC,CPTII,S$GLB, | Performed by: FAMILY MEDICINE

## 2021-06-16 ENCOUNTER — PATIENT MESSAGE (OUTPATIENT)
Dept: FAMILY MEDICINE | Facility: CLINIC | Age: 66
End: 2021-06-16

## 2021-07-09 ENCOUNTER — PATIENT MESSAGE (OUTPATIENT)
Dept: FAMILY MEDICINE | Facility: CLINIC | Age: 66
End: 2021-07-09

## 2021-07-16 ENCOUNTER — HOSPITAL ENCOUNTER (OUTPATIENT)
Dept: RADIOLOGY | Facility: HOSPITAL | Age: 66
Discharge: HOME | End: 2021-07-16
Attending: UROLOGY
Payer: MEDICARE

## 2021-07-16 ENCOUNTER — PATIENT MESSAGE (OUTPATIENT)
Dept: UROLOGY | Facility: CLINIC | Age: 66
End: 2021-07-16

## 2021-07-16 ENCOUNTER — CLINICAL SUPPORT (OUTPATIENT)
Dept: UROLOGY | Facility: CLINIC | Age: 66
End: 2021-07-16
Payer: MEDICARE

## 2021-07-16 DIAGNOSIS — Z85.51 HISTORY OF BLADDER CANCER: ICD-10-CM

## 2021-07-16 DIAGNOSIS — N20.1 LEFT URETERAL CALCULUS: Primary | ICD-10-CM

## 2021-07-16 LAB — POC RESIDUAL URINE VOLUME: 0 ML (ref 0–100)

## 2021-07-16 PROCEDURE — 99499 NO LOS: ICD-10-PCS | Mod: HCNC,S$GLB,, | Performed by: UROLOGY

## 2021-07-16 PROCEDURE — 51798 POCT BLADDER SCAN: ICD-10-PCS | Mod: HCNC,S$GLB,, | Performed by: UROLOGY

## 2021-07-16 PROCEDURE — 74018 RADEX ABDOMEN 1 VIEW: CPT | Mod: TC,HCNC,FY

## 2021-07-16 PROCEDURE — 76770 US EXAM ABDO BACK WALL COMP: CPT | Mod: 26,HCNC,, | Performed by: RADIOLOGY

## 2021-07-16 PROCEDURE — 76770 US RETROPERITONEAL COMPLETE: ICD-10-PCS | Mod: 26,HCNC,, | Performed by: RADIOLOGY

## 2021-07-16 PROCEDURE — 74018 XR ABDOMEN AP 1 VIEW: ICD-10-PCS | Mod: 26,HCNC,, | Performed by: RADIOLOGY

## 2021-07-16 PROCEDURE — 76770 US EXAM ABDO BACK WALL COMP: CPT | Mod: TC,HCNC

## 2021-07-16 PROCEDURE — 99499 UNLISTED E&M SERVICE: CPT | Mod: HCNC,S$GLB,, | Performed by: UROLOGY

## 2021-07-16 PROCEDURE — 51798 US URINE CAPACITY MEASURE: CPT | Mod: HCNC,S$GLB,, | Performed by: UROLOGY

## 2021-07-16 PROCEDURE — 74018 RADEX ABDOMEN 1 VIEW: CPT | Mod: 26,HCNC,, | Performed by: RADIOLOGY

## 2021-07-26 ENCOUNTER — PATIENT MESSAGE (OUTPATIENT)
Dept: UROLOGY | Facility: CLINIC | Age: 66
End: 2021-07-26

## 2021-11-24 ENCOUNTER — PATIENT MESSAGE (OUTPATIENT)
Dept: UROLOGY | Facility: CLINIC | Age: 66
End: 2021-11-24
Payer: MEDICARE

## 2021-11-24 ENCOUNTER — TELEPHONE (OUTPATIENT)
Dept: UROLOGY | Facility: CLINIC | Age: 66
End: 2021-11-24
Payer: MEDICARE

## 2021-11-24 DIAGNOSIS — Z85.46 HISTORY OF PROSTATE CANCER: Primary | ICD-10-CM

## 2022-01-07 ENCOUNTER — PATIENT MESSAGE (OUTPATIENT)
Dept: UROLOGY | Facility: CLINIC | Age: 67
End: 2022-01-07
Payer: MEDICARE

## 2022-01-20 ENCOUNTER — PATIENT MESSAGE (OUTPATIENT)
Dept: UROLOGY | Facility: CLINIC | Age: 67
End: 2022-01-20
Payer: MEDICARE

## 2022-01-26 ENCOUNTER — LAB VISIT (OUTPATIENT)
Dept: LAB | Facility: HOSPITAL | Age: 67
End: 2022-01-26
Attending: UROLOGY
Payer: MEDICARE

## 2022-01-26 ENCOUNTER — PATIENT MESSAGE (OUTPATIENT)
Dept: UROLOGY | Facility: CLINIC | Age: 67
End: 2022-01-26
Payer: MEDICARE

## 2022-01-26 DIAGNOSIS — Z85.46 HISTORY OF PROSTATE CANCER: ICD-10-CM

## 2022-01-26 LAB — COMPLEXED PSA SERPL-MCNC: 0.33 NG/ML (ref 0–4)

## 2022-01-26 PROCEDURE — 36415 COLL VENOUS BLD VENIPUNCTURE: CPT | Mod: HCNC | Performed by: UROLOGY

## 2022-01-26 PROCEDURE — 84153 ASSAY OF PSA TOTAL: CPT | Mod: HCNC | Performed by: UROLOGY

## 2022-02-02 ENCOUNTER — TELEPHONE (OUTPATIENT)
Dept: UROLOGY | Facility: CLINIC | Age: 67
End: 2022-02-02
Payer: MEDICARE

## 2022-02-02 NOTE — TELEPHONE ENCOUNTER
Called pt to rescheduled appt per Dr. Beltran advisement. New appt is virtual visit on 2/21/22 at 3:30

## 2022-02-20 NOTE — PROGRESS NOTES
[facility] Urology Progress Note (virtual/video visit)    Edwin Sepulveda III is a 66 y.o. male who presents for f/u of history bladder ca, prostate ca (XRT), and urethral stricture, and kidney stones     Hx XRT for CaP in 2014, Hx kidney stones, with lo grade noninvasive bladder cancer found 3/2019 L lat wall, after which he had retention and subsequent mmc instillation with difficult gr.   Cysto 7/19 found narrow bulbar urethral stricture just distal to the sphincter dilated at time of reTURBT 7/2019 of which path showed necrosis, inflammation, chronic follicular cystitis and mild reactive atypia consistent with radiation change. Biopsies 11/14/19 were negative.   Has had negative surveillance cystos since 7/20 and 1/21, able to passively dilate stricture w/ scope, taking 5d flomax after and otherwise no  meds. Unobstructed uroflow in oct 2020.  R renal colic July 2020 and scan noted 3 mm stone in the dependent portion of the bladder with mild R hydroureteroneprhosis. Additional 2 mm stone at the mouth of the left UVJ.  On review punctate R/L stone as well as larger 6-7mm L lower pole renal calculus remaining  Symptomatic passage of L 6-7mm stone obstructing in prox ureter on 1/24/21 (few weeks after cysto, at which time stable on KUB) and stone extraction URS on 2/12/21 during which Bladder negative, stricture passable with cystoscope. Cysto.stent removal on 3/2/21. Uroflow 7/21 with inadequate voided volume, but PVR 0c  Stone analysis:  100 % calcium oxalate monohydrate     He returns today noting  PSA 0.33 on 1/26/22.   PSA History:    Ref. Range 3/8/2018  1/29/2019  7/8/2019  10/22/2019  1/26/2022    PSA Diag 0.00 - 4.00  0.70 0.49 0.41 0.28 0.33   Last imaging on file is July 2021 at time of attempted uroflow noting US concern for 5mm L midpole stone, but KUB negative  No interim stone episodes  Urinating going well. Stream is good. Bladder doesn't hold as much as it used to. Goes when feels urge and is ok. No  UUI. Can sleep 4-5 hrs before getting up  Feels full at 200cc when has measured output  Feels like he is emptying. PVR 0cc in july      There were no vitals filed for this visit. 2/2 virtual visit, but denies fever       ASSESSMENT   1. History of prostate cancer     2. History of bladder cancer  Case Request Operating Room: CYSTOSCOPY   3. H/O urethral stricture  Case Request Operating Room: CYSTOSCOPY   4. Kidney stones  X-Ray Abdomen AP 1 View       Plan    From a history of prostate cancer standpoint, doing well status post radiation, though has urethral stricture which has been noted at previous points of care passively dilated on cystoscopy without formal management, from which he has been doing well and voiding well.  His PSA is at or below its previous wandy point without any concern for recurrence.  He is at the point of annual PSA screening out to year 10, of which two more years remain out to 2024.     From bladder cancer standpoint, low-grade noninvasive resected in March 2019 with subsequent cystoscopic surveillance negative for recurrence with 1 interim biopsy demonstrating radiation cystitis and reactive changes without malignancy.  Also discussed routine surveillance for recurrence with cystoscopy at 3/6/12 months and annually.  He is due for annual cystoscopy in the coming weeks, and this will continue for 2 more years out to 2024 as well.     Despite his urethral stricture, likely multifactorial, more so from radiation therapy for his prostate cancer, but likely exacerbated from difficult catheterization and mitomycin-C installation into the prostatic urethra after bladder cancer resection, and subsequent lower tract instrumentation, he is voiding well without complaint.  He does not take any baseline  medications and intermittently uses Flomax, usually after procedures.  We did have a visit set 6 months ago as an interim recheck of voiding to assess for any obstructive voiding pattern in the  interim in the absence of medication but did not have adequate voided volume but was emptying his bladder well and has no subjective complaints.  Will assess for stricture recurrence at time of surveillance cystoscopy, which may need passive dilation with the scope.  If any formal management is needed can discuss at that time.  Noted the difficulty of definitive stricture management and high likelihood of recurrence with irradiated tissue.  Had brief initial risk benefit discussion about self calibration self catheterization.     He is also recurrent kidney stone former, with 2 episodes in the last few years, small right-sided which passed spontaneously by time of ER visit, and subsequent small left-sided passage is symptomatic, with follow-up left-sided impacted obstructing proximal stone requiring 2 procedures for removal.  Reviewed his stone analysis as 100% calcium oxalate and reminded of low oxalate diet and conservative recommendations for stone prevention.  Last screening imaging had concern for possible left mid pole calculus on ultrasound the kub was negative, and the stones were previously very visible on KUB.  Prior to upcoming cystoscopy will have him repeat KUB.  He is in need of 24 hour urine as well for metabolic workup given recurrent stone formation     All urologic problems above address, all questions answered, and patient agreeable to treatment plan.  Cystoscopy scheduled at the [facility] on has been 4/19/22 (at patient request to have more time before scoping as he is doing so well currently).  Will set nurse visit 2 weeks prior for urine culture and urine cytology and get his KUB on the same day.  In the interim, will mail order for 24 hour urine and advised that he do so when his 1st convenient for him after receiving the order and instructions of these results are available at the time of his cystoscopy.      Visit type: Virtual visit with synchronous audio and video   The patient location is: home  2/2 covid19 outbreak with chief complaint as listed above HPI  Each patient to whom he or she provides medical services by telemedicine is:  (1) informed of the relationship between the physician and patient and the respective role of any other health care provider with respect to management of the patient; and (2) notified that he or she may decline to receive medical services by telemedicine and may withdraw from such care at any time    Total time spent in/on encounter today, including face to face time with patient, counseling, medical record review, interpretation of tests/results, , and treatment plan coordination: 40 minutes

## 2022-02-21 ENCOUNTER — PATIENT MESSAGE (OUTPATIENT)
Dept: UROLOGY | Facility: CLINIC | Age: 67
End: 2022-02-21

## 2022-02-21 ENCOUNTER — OFFICE VISIT (OUTPATIENT)
Dept: UROLOGY | Facility: CLINIC | Age: 67
End: 2022-02-21
Payer: MEDICARE

## 2022-02-21 DIAGNOSIS — N20.0 KIDNEY STONES: ICD-10-CM

## 2022-02-21 DIAGNOSIS — Z85.51 HISTORY OF BLADDER CANCER: ICD-10-CM

## 2022-02-21 DIAGNOSIS — Z85.46 HISTORY OF PROSTATE CANCER: Primary | ICD-10-CM

## 2022-02-21 DIAGNOSIS — Z87.448 H/O URETHRAL STRICTURE: ICD-10-CM

## 2022-02-21 PROCEDURE — 1159F MED LIST DOCD IN RCRD: CPT | Mod: HCNC,CPTII,95, | Performed by: UROLOGY

## 2022-02-21 PROCEDURE — 1160F PR REVIEW ALL MEDS BY PRESCRIBER/CLIN PHARMACIST DOCUMENTED: ICD-10-PCS | Mod: HCNC,CPTII,95, | Performed by: UROLOGY

## 2022-02-21 PROCEDURE — 1159F PR MEDICATION LIST DOCUMENTED IN MEDICAL RECORD: ICD-10-PCS | Mod: HCNC,CPTII,95, | Performed by: UROLOGY

## 2022-02-21 PROCEDURE — 99215 PR OFFICE/OUTPT VISIT, EST, LEVL V, 40-54 MIN: ICD-10-PCS | Mod: HCNC,95,, | Performed by: UROLOGY

## 2022-02-21 PROCEDURE — 1160F RVW MEDS BY RX/DR IN RCRD: CPT | Mod: HCNC,CPTII,95, | Performed by: UROLOGY

## 2022-02-21 PROCEDURE — 99215 OFFICE O/P EST HI 40 MIN: CPT | Mod: HCNC,95,, | Performed by: UROLOGY

## 2022-02-22 ENCOUNTER — PATIENT MESSAGE (OUTPATIENT)
Dept: UROLOGY | Facility: CLINIC | Age: 67
End: 2022-02-22
Payer: MEDICARE

## 2022-03-02 ENCOUNTER — PATIENT MESSAGE (OUTPATIENT)
Dept: SURGERY | Facility: AMBULARY SURGERY CENTER | Age: 67
End: 2022-03-02
Payer: MEDICARE

## 2022-03-17 ENCOUNTER — PATIENT MESSAGE (OUTPATIENT)
Dept: SURGERY | Facility: AMBULARY SURGERY CENTER | Age: 67
End: 2022-03-17
Payer: MEDICARE

## 2022-04-06 ENCOUNTER — CLINICAL SUPPORT (OUTPATIENT)
Dept: UROLOGY | Facility: CLINIC | Age: 67
End: 2022-04-06
Payer: MEDICARE

## 2022-04-06 ENCOUNTER — HOSPITAL ENCOUNTER (OUTPATIENT)
Dept: RADIOLOGY | Facility: HOSPITAL | Age: 67
Discharge: HOME | End: 2022-04-06
Attending: UROLOGY
Payer: MEDICARE

## 2022-04-06 DIAGNOSIS — N20.0 KIDNEY STONES: ICD-10-CM

## 2022-04-06 DIAGNOSIS — R82.998 CELLS AND CASTS IN URINE: Primary | ICD-10-CM

## 2022-04-06 PROCEDURE — 74018 RADEX ABDOMEN 1 VIEW: CPT | Mod: TC,FY

## 2022-04-06 PROCEDURE — 88112 CYTOPATH CELL ENHANCE TECH: CPT | Performed by: PATHOLOGY

## 2022-04-06 PROCEDURE — 74018 RADEX ABDOMEN 1 VIEW: CPT | Mod: 26,,, | Performed by: RADIOLOGY

## 2022-04-06 PROCEDURE — 88112 CYTOPATH CELL ENHANCE TECH: CPT | Mod: 26,,, | Performed by: PATHOLOGY

## 2022-04-06 PROCEDURE — 99499 NO LOS: ICD-10-PCS | Mod: S$GLB,,, | Performed by: UROLOGY

## 2022-04-06 PROCEDURE — 74018 XR ABDOMEN AP 1 VIEW: ICD-10-PCS | Mod: 26,,, | Performed by: RADIOLOGY

## 2022-04-06 PROCEDURE — 88112 PR  CYTOPATH, CELL ENHANCE TECH: ICD-10-PCS | Mod: 26,,, | Performed by: PATHOLOGY

## 2022-04-06 PROCEDURE — 99499 UNLISTED E&M SERVICE: CPT | Mod: S$GLB,,, | Performed by: UROLOGY

## 2022-04-06 PROCEDURE — 87086 URINE CULTURE/COLONY COUNT: CPT | Performed by: UROLOGY

## 2022-04-06 NOTE — PROGRESS NOTES
Patient presented to office for clean catch patient was instructed to CCMS  Urine collected and prepared for lab pickup.

## 2022-04-07 ENCOUNTER — PATIENT MESSAGE (OUTPATIENT)
Dept: UROLOGY | Facility: CLINIC | Age: 67
End: 2022-04-07
Payer: MEDICARE

## 2022-04-07 LAB — BACTERIA UR CULT: NO GROWTH

## 2022-04-08 LAB
FINAL PATHOLOGIC DIAGNOSIS: NORMAL
Lab: NORMAL

## 2022-05-09 ENCOUNTER — TELEPHONE (OUTPATIENT)
Dept: FAMILY MEDICINE | Facility: CLINIC | Age: 67
End: 2022-05-09
Payer: MEDICARE

## 2022-09-21 ENCOUNTER — HOSPITAL ENCOUNTER (INPATIENT)
Facility: HOSPITAL | Age: 67
LOS: 1 days | Discharge: HOME | DRG: 392 | End: 2022-09-22
Attending: EMERGENCY MEDICINE | Admitting: INTERNAL MEDICINE
Payer: MEDICARE

## 2022-09-21 DIAGNOSIS — K31.89 ACUTE GASTRIC VOLVULUS: Primary | ICD-10-CM

## 2022-09-21 DIAGNOSIS — K31.89 GASTRIC VOLVULUS: ICD-10-CM

## 2022-09-21 DIAGNOSIS — R10.13 EPIGASTRIC ABDOMINAL PAIN: ICD-10-CM

## 2022-09-21 LAB
ALBUMIN SERPL BCP-MCNC: 4.4 G/DL (ref 3.5–5.2)
ALP SERPL-CCNC: 64 U/L (ref 55–135)
ALT SERPL W/O P-5'-P-CCNC: 22 U/L (ref 10–44)
ANION GAP SERPL CALC-SCNC: 10 MMOL/L (ref 8–16)
AST SERPL-CCNC: 20 U/L (ref 10–40)
BASOPHILS # BLD AUTO: 0.03 K/UL (ref 0–0.2)
BASOPHILS NFR BLD: 0.5 % (ref 0–1.9)
BILIRUB SERPL-MCNC: 1 MG/DL (ref 0.1–1)
BILIRUB UR QL STRIP: NEGATIVE
BUN SERPL-MCNC: 24 MG/DL (ref 8–23)
CALCIUM SERPL-MCNC: 9.3 MG/DL (ref 8.7–10.5)
CHLORIDE SERPL-SCNC: 103 MMOL/L (ref 95–110)
CLARITY UR: CLEAR
CO2 SERPL-SCNC: 25 MMOL/L (ref 23–29)
COLOR UR: YELLOW
CREAT SERPL-MCNC: 0.9 MG/DL (ref 0.5–1.4)
CREAT SERPL-MCNC: 0.9 MG/DL (ref 0.5–1.4)
DIFFERENTIAL METHOD: ABNORMAL
EOSINOPHIL # BLD AUTO: 0 K/UL (ref 0–0.5)
EOSINOPHIL NFR BLD: 0.3 % (ref 0–8)
ERYTHROCYTE [DISTWIDTH] IN BLOOD BY AUTOMATED COUNT: 13.2 % (ref 11.5–14.5)
EST. GFR  (NO RACE VARIABLE): >60 ML/MIN/1.73 M^2
GLUCOSE SERPL-MCNC: 142 MG/DL (ref 70–110)
GLUCOSE UR QL STRIP: NEGATIVE
HCT VFR BLD AUTO: 46.8 % (ref 40–54)
HGB BLD-MCNC: 15.6 G/DL (ref 14–18)
HGB UR QL STRIP: NEGATIVE
IMM GRANULOCYTES # BLD AUTO: 0.01 K/UL (ref 0–0.04)
IMM GRANULOCYTES NFR BLD AUTO: 0.2 % (ref 0–0.5)
KETONES UR QL STRIP: ABNORMAL
LACTATE SERPL-SCNC: 1.6 MMOL/L (ref 0.5–1.9)
LEUKOCYTE ESTERASE UR QL STRIP: NEGATIVE
LIPASE SERPL-CCNC: 31 U/L (ref 4–60)
LYMPHOCYTES # BLD AUTO: 0.8 K/UL (ref 1–4.8)
LYMPHOCYTES NFR BLD: 12.2 % (ref 18–48)
MCH RBC QN AUTO: 30.3 PG (ref 27–31)
MCHC RBC AUTO-ENTMCNC: 33.3 G/DL (ref 32–36)
MCV RBC AUTO: 91 FL (ref 82–98)
MONOCYTES # BLD AUTO: 0.4 K/UL (ref 0.3–1)
MONOCYTES NFR BLD: 6.4 % (ref 4–15)
NEUTROPHILS # BLD AUTO: 5.1 K/UL (ref 1.8–7.7)
NEUTROPHILS NFR BLD: 80.4 % (ref 38–73)
NITRITE UR QL STRIP: NEGATIVE
NRBC BLD-RTO: 0 /100 WBC
PH UR STRIP: 6 [PH] (ref 5–8)
PLATELET # BLD AUTO: 165 K/UL (ref 150–450)
PMV BLD AUTO: 10.9 FL (ref 9.2–12.9)
POTASSIUM SERPL-SCNC: 3.9 MMOL/L (ref 3.5–5.1)
PROT SERPL-MCNC: 7.5 G/DL (ref 6–8.4)
PROT UR QL STRIP: ABNORMAL
RBC # BLD AUTO: 5.15 M/UL (ref 4.6–6.2)
SAMPLE: NORMAL
SODIUM SERPL-SCNC: 138 MMOL/L (ref 136–145)
SP GR UR STRIP: 1.03 (ref 1–1.03)
TROPONIN I SERPL DL<=0.01 NG/ML-MCNC: <0.03 NG/ML
URN SPEC COLLECT METH UR: ABNORMAL
UROBILINOGEN UR STRIP-ACNC: NEGATIVE EU/DL
WBC # BLD AUTO: 6.37 K/UL (ref 3.9–12.7)

## 2022-09-21 PROCEDURE — 36415 COLL VENOUS BLD VENIPUNCTURE: CPT | Performed by: EMERGENCY MEDICINE

## 2022-09-21 PROCEDURE — 96375 TX/PRO/DX INJ NEW DRUG ADDON: CPT

## 2022-09-21 PROCEDURE — 96374 THER/PROPH/DIAG INJ IV PUSH: CPT

## 2022-09-21 PROCEDURE — 25500020 PHARM REV CODE 255: Performed by: EMERGENCY MEDICINE

## 2022-09-21 PROCEDURE — 81003 URINALYSIS AUTO W/O SCOPE: CPT | Performed by: EMERGENCY MEDICINE

## 2022-09-21 PROCEDURE — 93010 ELECTROCARDIOGRAM REPORT: CPT | Mod: ,,, | Performed by: SPECIALIST

## 2022-09-21 PROCEDURE — 84484 ASSAY OF TROPONIN QUANT: CPT | Performed by: EMERGENCY MEDICINE

## 2022-09-21 PROCEDURE — 99285 EMERGENCY DEPT VISIT HI MDM: CPT | Mod: 25

## 2022-09-21 PROCEDURE — 80053 COMPREHEN METABOLIC PANEL: CPT | Performed by: EMERGENCY MEDICINE

## 2022-09-21 PROCEDURE — 93005 ELECTROCARDIOGRAM TRACING: CPT | Performed by: SPECIALIST

## 2022-09-21 PROCEDURE — 83605 ASSAY OF LACTIC ACID: CPT | Performed by: EMERGENCY MEDICINE

## 2022-09-21 PROCEDURE — 83690 ASSAY OF LIPASE: CPT | Performed by: EMERGENCY MEDICINE

## 2022-09-21 PROCEDURE — 63600175 PHARM REV CODE 636 W HCPCS: Performed by: EMERGENCY MEDICINE

## 2022-09-21 PROCEDURE — 85025 COMPLETE CBC W/AUTO DIFF WBC: CPT | Performed by: EMERGENCY MEDICINE

## 2022-09-21 PROCEDURE — 93010 EKG 12-LEAD: ICD-10-PCS | Mod: ,,, | Performed by: SPECIALIST

## 2022-09-21 RX ORDER — ONDANSETRON 2 MG/ML
4 INJECTION INTRAMUSCULAR; INTRAVENOUS
Status: COMPLETED | OUTPATIENT
Start: 2022-09-21 | End: 2022-09-21

## 2022-09-21 RX ORDER — MORPHINE SULFATE 4 MG/ML
4 INJECTION, SOLUTION INTRAMUSCULAR; INTRAVENOUS
Status: COMPLETED | OUTPATIENT
Start: 2022-09-21 | End: 2022-09-21

## 2022-09-21 RX ADMIN — MORPHINE SULFATE 4 MG: 4 INJECTION, SOLUTION INTRAMUSCULAR; INTRAVENOUS at 11:09

## 2022-09-21 RX ADMIN — IOHEXOL 100 ML: 350 INJECTION, SOLUTION INTRAVENOUS at 11:09

## 2022-09-21 RX ADMIN — ONDANSETRON HYDROCHLORIDE 4 MG: 2 SOLUTION INTRAMUSCULAR; INTRAVENOUS at 11:09

## 2022-09-22 ENCOUNTER — PATIENT MESSAGE (OUTPATIENT)
Dept: FAMILY MEDICINE | Facility: CLINIC | Age: 67
End: 2022-09-22
Payer: MEDICARE

## 2022-09-22 VITALS
HEIGHT: 69 IN | DIASTOLIC BLOOD PRESSURE: 71 MMHG | TEMPERATURE: 98 F | OXYGEN SATURATION: 94 % | WEIGHT: 165.38 LBS | BODY MASS INDEX: 24.5 KG/M2 | SYSTOLIC BLOOD PRESSURE: 134 MMHG | HEART RATE: 78 BPM | RESPIRATION RATE: 18 BRPM

## 2022-09-22 PROBLEM — K31.89 GASTRIC VOLVULUS: Status: ACTIVE | Noted: 2022-09-22

## 2022-09-22 LAB
ALBUMIN SERPL BCP-MCNC: 4.4 G/DL (ref 3.5–5.2)
ALP SERPL-CCNC: 60 U/L (ref 55–135)
ALT SERPL W/O P-5'-P-CCNC: 20 U/L (ref 10–44)
ANION GAP SERPL CALC-SCNC: 10 MMOL/L (ref 8–16)
AST SERPL-CCNC: 16 U/L (ref 10–40)
BILIRUB SERPL-MCNC: 1.1 MG/DL (ref 0.1–1)
BUN SERPL-MCNC: 21 MG/DL (ref 8–23)
CALCIUM SERPL-MCNC: 9.5 MG/DL (ref 8.7–10.5)
CHLORIDE SERPL-SCNC: 102 MMOL/L (ref 95–110)
CO2 SERPL-SCNC: 30 MMOL/L (ref 23–29)
CREAT SERPL-MCNC: 0.8 MG/DL (ref 0.5–1.4)
ERYTHROCYTE [DISTWIDTH] IN BLOOD BY AUTOMATED COUNT: 13.3 % (ref 11.5–14.5)
EST. GFR  (NO RACE VARIABLE): >60 ML/MIN/1.73 M^2
GLUCOSE SERPL-MCNC: 124 MG/DL (ref 70–110)
HCT VFR BLD AUTO: 46.4 % (ref 40–54)
HGB BLD-MCNC: 15.6 G/DL (ref 14–18)
MCH RBC QN AUTO: 31.1 PG (ref 27–31)
MCHC RBC AUTO-ENTMCNC: 33.6 G/DL (ref 32–36)
MCV RBC AUTO: 92 FL (ref 82–98)
PLATELET # BLD AUTO: 163 K/UL (ref 150–450)
PMV BLD AUTO: 10.7 FL (ref 9.2–12.9)
POTASSIUM SERPL-SCNC: 4.5 MMOL/L (ref 3.5–5.1)
PROT SERPL-MCNC: 7.5 G/DL (ref 6–8.4)
RBC # BLD AUTO: 5.02 M/UL (ref 4.6–6.2)
SARS-COV-2 RDRP RESP QL NAA+PROBE: NEGATIVE
SODIUM SERPL-SCNC: 142 MMOL/L (ref 136–145)
WBC # BLD AUTO: 9.28 K/UL (ref 3.9–12.7)

## 2022-09-22 PROCEDURE — 25000003 PHARM REV CODE 250: Performed by: INTERNAL MEDICINE

## 2022-09-22 PROCEDURE — 80053 COMPREHEN METABOLIC PANEL: CPT | Performed by: INTERNAL MEDICINE

## 2022-09-22 PROCEDURE — 36415 COLL VENOUS BLD VENIPUNCTURE: CPT | Performed by: INTERNAL MEDICINE

## 2022-09-22 PROCEDURE — C9113 INJ PANTOPRAZOLE SODIUM, VIA: HCPCS | Performed by: INTERNAL MEDICINE

## 2022-09-22 PROCEDURE — 85027 COMPLETE CBC AUTOMATED: CPT | Performed by: INTERNAL MEDICINE

## 2022-09-22 PROCEDURE — 99223 1ST HOSP IP/OBS HIGH 75: CPT | Mod: ,,, | Performed by: STUDENT IN AN ORGANIZED HEALTH CARE EDUCATION/TRAINING PROGRAM

## 2022-09-22 PROCEDURE — 99223 PR INITIAL HOSPITAL CARE,LEVL III: ICD-10-PCS | Mod: ,,, | Performed by: STUDENT IN AN ORGANIZED HEALTH CARE EDUCATION/TRAINING PROGRAM

## 2022-09-22 PROCEDURE — 21400001 HC TELEMETRY ROOM

## 2022-09-22 PROCEDURE — 63600175 PHARM REV CODE 636 W HCPCS: Performed by: INTERNAL MEDICINE

## 2022-09-22 PROCEDURE — 96376 TX/PRO/DX INJ SAME DRUG ADON: CPT

## 2022-09-22 PROCEDURE — U0002 COVID-19 LAB TEST NON-CDC: HCPCS | Performed by: EMERGENCY MEDICINE

## 2022-09-22 PROCEDURE — 63600175 PHARM REV CODE 636 W HCPCS: Performed by: EMERGENCY MEDICINE

## 2022-09-22 RX ORDER — HYDROMORPHONE HYDROCHLORIDE 1 MG/ML
0.5 INJECTION, SOLUTION INTRAMUSCULAR; INTRAVENOUS; SUBCUTANEOUS EVERY 6 HOURS PRN
Status: DISCONTINUED | OUTPATIENT
Start: 2022-09-22 | End: 2022-09-22 | Stop reason: HOSPADM

## 2022-09-22 RX ORDER — ONDANSETRON 2 MG/ML
4 INJECTION INTRAMUSCULAR; INTRAVENOUS EVERY 6 HOURS PRN
Status: DISCONTINUED | OUTPATIENT
Start: 2022-09-22 | End: 2022-09-22 | Stop reason: HOSPADM

## 2022-09-22 RX ORDER — CALCIUM GLUCONATE 20 MG/ML
1 INJECTION, SOLUTION INTRAVENOUS
Status: DISCONTINUED | OUTPATIENT
Start: 2022-09-22 | End: 2022-09-22 | Stop reason: HOSPADM

## 2022-09-22 RX ORDER — MAGNESIUM SULFATE HEPTAHYDRATE 40 MG/ML
2 INJECTION, SOLUTION INTRAVENOUS
Status: DISCONTINUED | OUTPATIENT
Start: 2022-09-22 | End: 2022-09-22 | Stop reason: HOSPADM

## 2022-09-22 RX ORDER — MORPHINE SULFATE 4 MG/ML
4 INJECTION, SOLUTION INTRAMUSCULAR; INTRAVENOUS
Status: COMPLETED | OUTPATIENT
Start: 2022-09-22 | End: 2022-09-22

## 2022-09-22 RX ORDER — PANTOPRAZOLE SODIUM 40 MG/10ML
40 INJECTION, POWDER, LYOPHILIZED, FOR SOLUTION INTRAVENOUS DAILY
Status: DISCONTINUED | OUTPATIENT
Start: 2022-09-22 | End: 2022-09-22 | Stop reason: HOSPADM

## 2022-09-22 RX ORDER — CALCIUM GLUCONATE 20 MG/ML
2 INJECTION, SOLUTION INTRAVENOUS
Status: DISCONTINUED | OUTPATIENT
Start: 2022-09-22 | End: 2022-09-22 | Stop reason: HOSPADM

## 2022-09-22 RX ORDER — SODIUM CHLORIDE 9 MG/ML
INJECTION, SOLUTION INTRAVENOUS CONTINUOUS
Status: DISCONTINUED | OUTPATIENT
Start: 2022-09-22 | End: 2022-09-22 | Stop reason: HOSPADM

## 2022-09-22 RX ORDER — HYDROMORPHONE HYDROCHLORIDE 1 MG/ML
0.5 INJECTION, SOLUTION INTRAMUSCULAR; INTRAVENOUS; SUBCUTANEOUS EVERY 6 HOURS PRN
Status: DISCONTINUED | OUTPATIENT
Start: 2022-09-22 | End: 2022-09-22

## 2022-09-22 RX ORDER — POTASSIUM CHLORIDE 7.45 MG/ML
80 INJECTION INTRAVENOUS
Status: DISCONTINUED | OUTPATIENT
Start: 2022-09-22 | End: 2022-09-22 | Stop reason: HOSPADM

## 2022-09-22 RX ORDER — MAGNESIUM SULFATE HEPTAHYDRATE 40 MG/ML
4 INJECTION, SOLUTION INTRAVENOUS
Status: DISCONTINUED | OUTPATIENT
Start: 2022-09-22 | End: 2022-09-22 | Stop reason: HOSPADM

## 2022-09-22 RX ORDER — POTASSIUM CHLORIDE 7.45 MG/ML
60 INJECTION INTRAVENOUS
Status: DISCONTINUED | OUTPATIENT
Start: 2022-09-22 | End: 2022-09-22 | Stop reason: HOSPADM

## 2022-09-22 RX ORDER — CALCIUM GLUCONATE 20 MG/ML
3 INJECTION, SOLUTION INTRAVENOUS
Status: DISCONTINUED | OUTPATIENT
Start: 2022-09-22 | End: 2022-09-22 | Stop reason: HOSPADM

## 2022-09-22 RX ORDER — POTASSIUM CHLORIDE 7.45 MG/ML
40 INJECTION INTRAVENOUS
Status: DISCONTINUED | OUTPATIENT
Start: 2022-09-22 | End: 2022-09-22 | Stop reason: HOSPADM

## 2022-09-22 RX ORDER — ENOXAPARIN SODIUM 100 MG/ML
40 INJECTION SUBCUTANEOUS EVERY 24 HOURS
Status: DISCONTINUED | OUTPATIENT
Start: 2022-09-22 | End: 2022-09-22

## 2022-09-22 RX ORDER — HYDRALAZINE HYDROCHLORIDE 20 MG/ML
10 INJECTION INTRAMUSCULAR; INTRAVENOUS EVERY 4 HOURS PRN
Status: DISCONTINUED | OUTPATIENT
Start: 2022-09-22 | End: 2022-09-22 | Stop reason: HOSPADM

## 2022-09-22 RX ADMIN — MORPHINE SULFATE 4 MG: 4 INJECTION, SOLUTION INTRAMUSCULAR; INTRAVENOUS at 12:09

## 2022-09-22 RX ADMIN — PANTOPRAZOLE SODIUM 40 MG: 40 INJECTION, POWDER, FOR SOLUTION INTRAVENOUS at 09:09

## 2022-09-22 RX ADMIN — HYDROMORPHONE HYDROCHLORIDE 0.5 MG: 1 INJECTION, SOLUTION INTRAMUSCULAR; INTRAVENOUS; SUBCUTANEOUS at 07:09

## 2022-09-22 RX ADMIN — SODIUM CHLORIDE: 0.9 INJECTION, SOLUTION INTRAVENOUS at 02:09

## 2022-09-22 RX ADMIN — ONDANSETRON HYDROCHLORIDE 4 MG: 2 SOLUTION INTRAMUSCULAR; INTRAVENOUS at 05:09

## 2022-09-22 RX ADMIN — SODIUM CHLORIDE: 0.9 INJECTION, SOLUTION INTRAVENOUS at 03:09

## 2022-09-22 NOTE — H&P
[facility] Medicine  History & Physical    Patient Name: Edwin Sepulveda III  MRN: [id number]  Patient Class: IP- Inpatient  Admission Date: 9/21/2022  Attending Physician: Lenin Rivero MD   Primary Care Provider: Francis Teague MD         Patient information was obtained from patient, past medical records and ER records.     Subjective:     Principal Problem:Gastric volvulus    Chief Complaint:   Chief Complaint   Patient presents with    Abdominal Pain     LLQ pain started today    Nausea        HPI: 67 year old patient getting admitted with acute gastric volvulus  Pt started having severe abdominal pain hrs before he came to ER  Was having generalized severe pain / no radiation   Pian was associated with several episodes of red color vomitus  In ER NG tube was inserted with resolution of most of his symptoms  Patients Mother had intestinal volvulus in the past       Past Medical History:   Diagnosis Date    Family history of malignant neoplasm of prostate 8/14/2011    Gastroesophageal reflux disease with hiatal hernia     History of prostate cancer 1/7/2016    Hyperlipidemia     Kidney stones     Multinodular goiter     no medications    Prostate cancer 10/20/2014    Prostate disorder     Dr Santos        Past Surgical History:   Procedure Laterality Date    APPENDECTOMY      BLADDER FULGURATION Left 3/13/2019    Procedure: FULGURATION, BLADDER;  Surgeon: Adin Santos MD;  Location: [facility] OR;  Service: Urology;  Laterality: Left;  tumor    COLONOSCOPY  3/26/2008    5-10 year recheck per Dr. Borja (normal exam)    CYSTOSCOPY N/A 7/9/2019    Procedure: CYSTOSCOPY;  Surgeon: Kermit Beltran MD;  Location: [facility] OR;  Service: Urology;  Laterality: N/A;    CYSTOSCOPY N/A 10/29/2019    Procedure: CYSTOSCOPY;  Surgeon: Kermit Beltran MD;  Location: [facility] OR;  Service: Urology;  Laterality: N/A;  text pt if he doesnt answer.  Collect urine    CYSTOSCOPY N/A 7/8/2020     Procedure: CYSTOSCOPY;  Surgeon: Kermit Beltran MD;  Location: [facility] OR;  Service: Urology;  Laterality: N/A;    CYSTOSCOPY N/A 1/12/2021    Procedure: CYSTOSCOPY;  Surgeon: Kermit Beltran MD;  Location: [facility] OR;  Service: Urology;  Laterality: N/A;    CYSTOSCOPY W/ URETERAL STENT REMOVAL Left 3/2/2021    Procedure: CYSTOSCOPY, WITH URETERAL STENT REMOVAL;  Surgeon: Kermit Beltran MD;  Location: [facility] OR;  Service: Urology;  Laterality: Left;    CYSTOSCOPY WITH BIOPSY OF BLADDER N/A 11/14/2019    Procedure: CYSTOSCOPY, WITH BLADDER BIOPSY, WITH FULGURATION IF INDICATED;  Surgeon: Kermit Beltran MD;  Location: [facility] OR;  Service: Urology;  Laterality: N/A;    CYSTOSCOPY WITH URETEROSCOPY, RETROGRADE PYELOGRAPHY, AND INSERTION OF STENT Left 1/28/2021    Procedure: CYSTOSCOPY, WITH RETROGRADE PYELOGRAM AND URETERAL STENT INSERTION;  Surgeon: Kermit Beltran MD;  Location: [facility] OR;  Service: Urology;  Laterality: Left;    CYSTOSCOPY WITH URETEROSCOPY, RETROGRADE PYELOGRAPHY, AND INSERTION OF STENT Left 2/11/2021    Procedure: CYSTOSCOPY, WITH RETROGRADE PYELOGRAM AND URETERAL STENT INSERTION/exchange;  Surgeon: Kermit Beltran MD;  Location: [facility] OR;  Service: Urology;  Laterality: Left;    DILATION OF URETHRA N/A 7/25/2019    Procedure: DILATION, URETHRA;  Surgeon: Kermit Beltran MD;  Location: [facility] OR;  Service: Urology;  Laterality: N/A;    DILATION OF URETHRA  1/12/2021    Procedure: DILATION, URETHRA;  Surgeon: Kermit Beltran MD;  Location: [facility] OR;  Service: Urology;;    esphogus scope      FLEXIBLE CYSTOSCOPY N/A 2/13/2019    Procedure: CYSTOSCOPY, FLEXIBLE;  Surgeon: Adin Santos MD;  Location: [facility] OR;  Service: Urology;  Laterality: N/A;    LASER LITHOTRIPSY Left 2/11/2021    Procedure: LITHOTRIPSY, USING LASER;  Surgeon: Kermit Beltran MD;  Location: [facility] OR;  Service: Urology;  Laterality: Left;    leg repair Left     ORCHIECTOMY      spermatocele repair       URETEROSCOPIC REMOVAL OF URETERIC CALCULUS Left 2021    Procedure: REMOVAL, CALCULUS, URETER, URETEROSCOPIC;  Surgeon: Kermit Beltran MD;  Location: [facility];  Service: Urology;  Laterality: Left;       Review of patient's allergies indicates:   Allergen Reactions    Penicillins Rash     Other reaction(s): Rash       Current Facility-Administered Medications on File Prior to Encounter   Medication    electrolyte-S (ISOLYTE)    electrolyte-S (ISOLYTE)    [DISCONTINUED] ondansetron injection 4 mg     No current outpatient medications on file prior to encounter.     Family History       Problem Relation (Age of Onset)    COPD Father    Cancer Father, Maternal Grandmother, Paternal Grandfather    Diabetes Sister    Hyperlipidemia Sister    Hypertension Mother, Sister    Stroke Mother (94)          Tobacco Use    Smoking status: Former     Types: Cigarettes     Quit date: 1976     Years since quittin.6    Smokeless tobacco: Never   Substance and Sexual Activity    Alcohol use: Yes     Alcohol/week: 3.0 standard drinks     Types: 3 Glasses of wine per week     Comment: weekly    Drug use: No    Sexual activity: Yes     Partners: Female     Review of Systems   Constitutional:  Negative for activity change and appetite change.   HENT:  Negative for congestion and dental problem.    Eyes:  Negative for discharge and itching.   Respiratory:  Negative for shortness of breath.    Cardiovascular:  Negative for chest pain.   Gastrointestinal:  Positive for abdominal pain, nausea and vomiting. Negative for abdominal distention.   Endocrine: Negative for cold intolerance.   Genitourinary:  Negative for difficulty urinating and dysuria.   Musculoskeletal:  Negative for arthralgias and back pain.   Skin:  Negative for color change.   Neurological:  Negative for dizziness and facial asymmetry.   Hematological:  Negative for adenopathy.   Psychiatric/Behavioral:  Negative for agitation and behavioral  problems.    Objective:     Vital Signs (Most Recent):  Temp: 97.9 °F (36.6 °C) (09/22/22 0445)  Pulse: 88 (09/22/22 0445)  Resp: 20 (09/22/22 0445)  BP: (!) 158/89 (09/22/22 0445)  SpO2: 98 % (09/22/22 0445)   Vital Signs (24h Range):  Temp:  [97.3 °F (36.3 °C)-97.9 °F (36.6 °C)] 97.9 °F (36.6 °C)  Pulse:  [62-98] 88  Resp:  [18-20] 20  SpO2:  [95 %-98 %] 98 %  BP: (157-189)/() 158/89     Weight: 75 kg (165 lb 5.5 oz)  Body mass index is 24.42 kg/m².    Physical Exam  Vitals and nursing note reviewed.   Constitutional:       Appearance: He is well-developed.   HENT:      Head: Atraumatic.      Right Ear: External ear normal.      Left Ear: External ear normal.      Nose: Nose normal.      Mouth/Throat:      Mouth: Mucous membranes are moist.   Eyes:      General: No scleral icterus.  Cardiovascular:      Rate and Rhythm: Normal rate.   Pulmonary:      Effort: Pulmonary effort is normal.   Abdominal:      General: There is no distension.   Musculoskeletal:         General: Normal range of motion.      Cervical back: Full passive range of motion without pain and normal range of motion.   Skin:     General: Skin is warm.   Neurological:      Mental Status: He is alert and oriented to person, place, and time.   Psychiatric:         Behavior: Behavior normal.           Significant Labs: All pertinent labs within the past 24 hours have been reviewed.  CBC:   Recent Labs   Lab 09/21/22  2309   WBC 6.37   HGB 15.6   HCT 46.8        CMP:   Recent Labs   Lab 09/21/22  2309      K 3.9      CO2 25   *   BUN 24*   CREATININE 0.9   CALCIUM 9.3   PROT 7.5   ALBUMIN 4.4   BILITOT 1.0   ALKPHOS 64   AST 20   ALT 22   ANIONGAP 10       Significant Imaging: I have reviewed all pertinent imaging results/findings within the past 24 hours.    Assessment/Plan:     * Gastric volvulus  Conservative Rx :NG Tube/iv fluids and pain management   Consult surgeon for possible decompression      Malignant neoplasm  of prostate  Aware       Gastroesophageal reflux disease with hiatal hernia  Ongoing issue        VTE Risk Mitigation (From admission, onward)         Ordered     IP VTE HIGH RISK PATIENT  Once         09/22/22 0147     Place sequential compression device  Until discontinued         09/22/22 0147                   Lenin Rivero MD  Department of Hospital Medicine   [facility]

## 2022-09-22 NOTE — NURSING
Ng tube out and tolerated clear liquids. Iv d/c' d w/ cath tip intact. D/c instructions given. Pt verb. Understanding, all questions answered to pt and wife's satisfaction. Transported via wc safely to private auto for d/c home.

## 2022-09-22 NOTE — NURSING
RECEIVED TO ROOM 3014 VIA WHEELCHAIR FROM ER. AWAKE AND ALERT. RESP EVEN AND UNLABORED. SKIN WARM AND DRY. SALINE LOCK TO LEFT A/C WITHOUT REDNESS OR EDEMA. NG TUBE TO RIGHT NOSTRIL AND CONNECTED TO LOW SUCTION. ORIENTED TO ROOM AND CALL LIGHT SYSTEM. WIFE AT BEDSIDE. DENIES ANY COMPLAINTS AT THIS TIME. NO DISTRESS NOTED.

## 2022-09-22 NOTE — PROGRESS NOTES
Patient reexamined.  Interval underwent upper GI which showed free flow of contrast past the hiatal hernia.  He is tolerating clear liquids since the NG tube came out without abdominal pain.  It appears that he has a chronic volvulus of the stomach without strangulation and without obstruction.    Will plan on outpatient repair.  Patient has scheduled follow-up with me on Monday.  Concerning signs and symptoms were discussed.  Okay for DC from my standpoint.

## 2022-09-22 NOTE — CARE UPDATE
Patient states that he feels better, and is passing flatus   Awaiting surgeon recommendations   NG tube in-situ  H&P per previous physician, labs and images reviewed.  Continue current care management.

## 2022-09-22 NOTE — CONSULTS
Pending sale to [facility]  General Surgery  Consult Note    Inpatient consult to General Surgery  Consult performed by: David Mcmahan MD  Consult ordered by: Lenin Rivero MD      Subjective:     Chief Complaint/Reason for Admission:  Left lower quadrant abdominal pain    History of Present Illness:  This is a 67-year-old male who presented with increasing left lower quadrant abdominal pain.  Had imaging in the ED that showed a large hiatal hernia.  He has known that he is had hiatal hernia for some years.  He had an NG-tube placed and pain gradually resolved.  I was consulted for possible gastric volvulus.  Patient is currently asymptomatic with an NG-tube in  Current Facility-Administered Medications on File Prior to Encounter   Medication    electrolyte-S (ISOLYTE)    electrolyte-S (ISOLYTE)    [DISCONTINUED] ondansetron injection 4 mg     No current outpatient medications on file prior to encounter.       Review of patient's allergies indicates:   Allergen Reactions    Penicillins Rash     Other reaction(s): Rash       Past Medical History:   Diagnosis Date    Family history of malignant neoplasm of prostate 8/14/2011    Gastroesophageal reflux disease with hiatal hernia     History of prostate cancer 1/7/2016    Hyperlipidemia     Kidney stones     Multinodular goiter     no medications    Prostate cancer 10/20/2014    Prostate disorder     Dr Santos      Past Surgical History:   Procedure Laterality Date    APPENDECTOMY      BLADDER FULGURATION Left 3/13/2019    Procedure: FULGURATION, BLADDER;  Surgeon: Adin Santos MD;  Location: [facility] OR;  Service: Urology;  Laterality: Left;  tumor    COLONOSCOPY  3/26/2008    5-10 year recheck per Dr. Borja (normal exam)    CYSTOSCOPY N/A 7/9/2019    Procedure: CYSTOSCOPY;  Surgeon: Kermit Beltran MD;  Location: [facility] OR;  Service: Urology;  Laterality: N/A;    CYSTOSCOPY N/A 10/29/2019    Procedure: CYSTOSCOPY;  Surgeon: Kermit Beltran MD;  Location: [facility]  OR;  Service: Urology;  Laterality: N/A;  text pt if he doesnt answer.  Collect urine    CYSTOSCOPY N/A 7/8/2020    Procedure: CYSTOSCOPY;  Surgeon: Kermit Beltran MD;  Location: [facility] OR;  Service: Urology;  Laterality: N/A;    CYSTOSCOPY N/A 1/12/2021    Procedure: CYSTOSCOPY;  Surgeon: Kermit Beltran MD;  Location: [facility] OR;  Service: Urology;  Laterality: N/A;    CYSTOSCOPY W/ URETERAL STENT REMOVAL Left 3/2/2021    Procedure: CYSTOSCOPY, WITH URETERAL STENT REMOVAL;  Surgeon: Kermit Beltran MD;  Location: [facility] OR;  Service: Urology;  Laterality: Left;    CYSTOSCOPY WITH BIOPSY OF BLADDER N/A 11/14/2019    Procedure: CYSTOSCOPY, WITH BLADDER BIOPSY, WITH FULGURATION IF INDICATED;  Surgeon: Kermit Beltran MD;  Location: [facility] OR;  Service: Urology;  Laterality: N/A;    CYSTOSCOPY WITH URETEROSCOPY, RETROGRADE PYELOGRAPHY, AND INSERTION OF STENT Left 1/28/2021    Procedure: CYSTOSCOPY, WITH RETROGRADE PYELOGRAM AND URETERAL STENT INSERTION;  Surgeon: Kermit Beltran MD;  Location: [facility] OR;  Service: Urology;  Laterality: Left;    CYSTOSCOPY WITH URETEROSCOPY, RETROGRADE PYELOGRAPHY, AND INSERTION OF STENT Left 2/11/2021    Procedure: CYSTOSCOPY, WITH RETROGRADE PYELOGRAM AND URETERAL STENT INSERTION/exchange;  Surgeon: Kermit Beltran MD;  Location: [facility] OR;  Service: Urology;  Laterality: Left;    DILATION OF URETHRA N/A 7/25/2019    Procedure: DILATION, URETHRA;  Surgeon: Kermit Beltran MD;  Location: [facility] OR;  Service: Urology;  Laterality: N/A;    DILATION OF URETHRA  1/12/2021    Procedure: DILATION, URETHRA;  Surgeon: Kermit Beltran MD;  Location: [facility] OR;  Service: Urology;;    esphogus scope      FLEXIBLE CYSTOSCOPY N/A 2/13/2019    Procedure: CYSTOSCOPY, FLEXIBLE;  Surgeon: Adin Santos MD;  Location: [facility] OR;  Service: Urology;  Laterality: N/A;    LASER LITHOTRIPSY Left 2/11/2021    Procedure: LITHOTRIPSY, USING LASER;  Surgeon: Kermit Beltran MD;  Location: [facility];   Service: Urology;  Laterality: Left;    leg repair Left     ORCHIECTOMY      spermatocele repair      URETEROSCOPIC REMOVAL OF URETERIC CALCULUS Left 2021    Procedure: REMOVAL, CALCULUS, URETER, URETEROSCOPIC;  Surgeon: Kermit Beltran MD;  Location: [facility];  Service: Urology;  Laterality: Left;     Family History       Problem Relation (Age of Onset)    COPD Father    Cancer Father, Maternal Grandmother, Paternal Grandfather    Diabetes Sister    Hyperlipidemia Sister    Hypertension Mother, Sister    Stroke Mother (94)          Tobacco Use    Smoking status: Former     Types: Cigarettes     Quit date: 1976     Years since quittin.6    Smokeless tobacco: Never   Substance and Sexual Activity    Alcohol use: Yes     Alcohol/week: 3.0 standard drinks     Types: 3 Glasses of wine per week     Comment: weekly    Drug use: No    Sexual activity: Yes     Partners: Female     Review of Systems   Constitutional: Negative.  Negative for fatigue and fever.   HENT: Negative.     Eyes: Negative.    Respiratory: Negative.  Negative for shortness of breath.    Cardiovascular: Negative.  Negative for chest pain.   Gastrointestinal: Negative.    Endocrine: Negative.    Genitourinary: Negative.    Musculoskeletal: Negative.    Skin: Negative.    Allergic/Immunologic: Negative.    Neurological: Negative.    Hematological: Negative.    Psychiatric/Behavioral: Negative.     Objective:     Vital Signs (Most Recent):  Temp: 97.9 °F (36.6 °C) (22)  Pulse: 75 (22)  Resp: 18 (22)  BP: (!) 153/78 (22)  SpO2: 97 % (22)   Vital Signs (24h Range):  Temp:  [97.3 °F (36.3 °C)-98 °F (36.7 °C)] 97.9 °F (36.6 °C)  Pulse:  [62-98] 75  Resp:  [15-20] 18  SpO2:  [95 %-98 %] 97 %  BP: (153-189)/() 153/78     Weight: 75 kg (165 lb 5.5 oz)  Body mass index is 24.42 kg/m².      Intake/Output Summary (Last 24 hours) at 2022 1222  Last data filed at 2022 1219  Gross  per 24 hour   Intake 0 ml   Output 680 ml   Net -680 ml       Physical Exam  Constitutional:       General: He is not in acute distress.     Appearance: Normal appearance. He is not ill-appearing, toxic-appearing or diaphoretic.   HENT:      Head: Normocephalic.      Nose: Nose normal.   Eyes:      Conjunctiva/sclera: Conjunctivae normal.   Cardiovascular:      Rate and Rhythm: Normal rate and regular rhythm.   Pulmonary:      Effort: Pulmonary effort is normal.   Abdominal:      Palpations: Abdomen is soft.   Musculoskeletal:         General: Normal range of motion.      Cervical back: Normal range of motion.   Skin:     General: Skin is warm.   Neurological:      General: No focal deficit present.      Mental Status: He is alert.   Psychiatric:         Mood and Affect: Mood normal.       Significant Labs:  CBC:   Recent Labs   Lab 09/22/22  0840   WBC 9.28   RBC 5.02   HGB 15.6   HCT 46.4      MCV 92   MCH 31.1*   MCHC 33.6     CMP:   Recent Labs   Lab 09/22/22  0840   *   CALCIUM 9.5   ALBUMIN 4.4   PROT 7.5      K 4.5   CO2 30*      BUN 21   CREATININE 0.8   ALKPHOS 60   ALT 20   AST 16   BILITOT 1.1*     Coagulation: No results for input(s): PT, INR, APTT in the last 48 hours.  Lactic Acid:   Recent Labs   Lab 09/21/22  2315   LACTATE 1.6       Significant Diagnostics:  All imaging has been reviewed.  Large hiatal hernia    Assessment/Plan:     Active Diagnoses:    Diagnosis Date Noted POA    PRINCIPAL PROBLEM:  Gastric volvulus [K31.89] 09/22/2022 Unknown    Malignant neoplasm of prostate [C61] 08/22/2018 Yes    Gastroesophageal reflux disease with hiatal hernia [K21.9, K44.9]  Yes      Problems Resolved During this Admission:       67-year-old male who presented with left lower quadrant abdominal pain and imaging showed a large hiatal hernia with a distended stomach.  Unclear if symptoms were related to his hiatal hernia or not given the location, but they did seem to resolve with NG  tube decompression.  Patient is currently stable.  Labs are normal.    Recommend proceeding with upper GI with the NG tube clamped to assess for patency of the upper GI system.  If contrast reaches the duodenum, okay to remove NG tube and trial clear liquids.  If patient tolerates clears, okay for DC later this afternoon with outpatient follow-up to discuss elective repair of his hiatal hernia robotically.  If there is obstruction, will discuss operative intervention further    Thank you for your consult. I will follow-up with patient. Please contact us if you have any additional questions.    David Mcmahan MD  General Surgery  [facility]

## 2022-09-22 NOTE — ASSESSMENT & PLAN NOTE
Conservative Rx :NG Tube/iv fluids and pain management   Consult surgeon for possible decompression

## 2022-09-22 NOTE — NURSING
Night shift RN reported approx 25mls out gastric output. Pt called w/ nausea and pain. Dilaudid given. NG tube advanced and 280mls of output returned immediately. Milky tan w/ some brown noted. Pt reported relief of nausea w/ drainage.

## 2022-09-22 NOTE — DISCHARGE SUMMARY
Pending sale to [facility]  Discharge Summary      Patient Name: Edwin Sepulveda III  MRN: [id number]  Patient Class: IP- Inpatient  Admission Date: 9/21/2022  Hospital Length of Stay: 0 days  Discharge Date and Time:  09/22/2022 5:52 PM  Attending Physician: Ena Vega MD   Discharging Provider: Ena Vega MD  Primary Care Provider: Francis Teague MD      HPI:   67 year old patient getting admitted with acute gastric volvulus  Pt started having severe abdominal pain hrs before he came to ER  Was having generalized severe pain / no radiation   Pian was associated with several episodes of red color vomitus  In ER NG tube was inserted with resolution of most of his symptoms  Patients Mother had intestinal volvulus in the past       * No surgery found *      Hospital Course:   Surgery evaluated patient, removed NG tube and start the patient on clear liquids which he tolerated.  Surgery then cleared patient for discharge with plans for outpatient repair.  He is to follow up the surgery on Monday.    Physical examination on discharge:  Constitutional: No distress.   HENT: NC  Head: Atraumatic.   Cardiovascular: Normal rate, regular rhythm and normal heart sounds.   Pulmonary/Chest: Effort normal. No wheezes.   Abdominal: Soft. Bowel sounds are normal. No distension and no mass. No tenderness  Neurological: Alert.   Skin: Skin is warm and dry.   Psych: Appropriate mood and affect    I have seen the patient on the day of discharge and reviewed the discharge instructions as outlined.    Goals of Care Treatment Preferences:  Code Status: Full Code      Consults:   Consults (From admission, onward)        Status Ordering Provider     Inpatient consult to Hospitalist  Once        Provider:  (Not yet assigned)    Acknowledged YULIA SIDDIQI     Inpatient consult to General Surgery  Once        Provider:  Cortes Bryant MD    Completed YULIA SIDDIQI          No new Assessment & Plan notes have been  filed under this hospital service since the last note was generated.  Service: Hospital Medicine    Final Active Diagnoses:    Diagnosis Date Noted POA    PRINCIPAL PROBLEM:  Gastric volvulus [K31.89] 09/22/2022 Yes    Malignant neoplasm of prostate [C61] 08/22/2018 Yes    Gastroesophageal reflux disease with hiatal hernia [K21.9, K44.9]  Yes      Problems Resolved During this Admission:       Discharged Condition: good    Disposition: Home or Self Care    Follow Up:   Follow-up Information     Francis Teague MD Follow up in 1 week(s).    Specialty: Family Medicine  Contact information:  [address]  [address]  [place] [address]  [phone]             David Mcmahan MD Follow up on 9/26/2022.    Specialty: General Surgery  Contact information:  [address]  [address]  [place] [address]  [phone]                       Patient Instructions:      Activity as tolerated       Significant Diagnostic Studies: Labs:   BMP:   Recent Labs   Lab 09/21/22 2309 09/22/22  0840   * 124*    142   K 3.9 4.5    102   CO2 25 30*   BUN 24* 21   CREATININE 0.9 0.8   CALCIUM 9.3 9.5   , CMP   Recent Labs   Lab 09/21/22 2309 09/22/22  0840    142   K 3.9 4.5    102   CO2 25 30*   * 124*   BUN 24* 21   CREATININE 0.9 0.8   CALCIUM 9.3 9.5   PROT 7.5 7.5   ALBUMIN 4.4 4.4   BILITOT 1.0 1.1*   ALKPHOS 64 60   AST 20 16   ALT 22 20   ANIONGAP 10 10   , CBC   Recent Labs   Lab 09/21/22 2309 09/22/22  0840   WBC 6.37 9.28   HGB 15.6 15.6   HCT 46.8 46.4    163    and All labs within the past 24 hours have been reviewed  Radiology: X-Ray: CXR: X-Ray Chest 1 View (CXR): No results found for this visit on 09/21/22. and X-Ray Chest PA and Lateral (CXR): No results found for this visit on 09/21/22. and KUB: X-Ray Abdomen AP 1 View (KUB):   Results for orders placed or performed during the hospital encounter of 09/21/22   X-Ray Abdomen AP 1 View (KUB)    Narrative    KUB    CLINICAL  DATA: Nasogastric tube placement    FINDINGS: Comparison to April 2022. Moderate-sized hiatal hernia is noted. Nasogastric tube tip projects at the gastric lumen just distal to the hernia. Bowel gas pattern is nonobstructive. No mass, suspicious calcification, or free air is identified. Small amounts of contrast are noted in the bilateral renal collecting systems.    IMPRESSION:  1. Nasogastric tube tip projects at the gastric lumen just distal to moderate sized hiatal hernia.    Electronically signed by:  Cristino Lundberg MD  9/22/2022 6:23 AM CDT Workstation: [id number]     CT scan: CT ABDOMEN PELVIS WITH CONTRAST:   Results for orders placed or performed during the hospital encounter of 09/21/22   CT Abdomen Pelvis With Contrast    Narrative    CT ABDOMEN PELVIS WITH IV CONTRAST    Exam date: September 21, 2022    Comparison: CT abdomen pelvis January 23, 2021    Indication: Left lower quadrant abdominal pain    Technique:    Multiple helical axial images were obtained through the abdomen and pelvis using intravenous contrast. Coronal and sagittal reformatted images were obtained.    All CT scans at this facility use dose modulation, iterative reconstruction, and/or weight-based dosing when appropriate to reduce radiation dose to as low as reasonably achievable.    Findings:    Lung bases: [There is moderate size hiatal hernia]. There are streaky opacities in the lung bases suggesting atelectasis.    Liver: [Homogenous attenuation is noted.] A few subcentimeter hypodensities in the liver are present too small to characterize.    Gallbladder/biliary: [Appears unremarkable]    Pancreas: [Unremarkable.  No evidence of ductal enlargement.]    Spleen: Appears unremarkable. No splenomegaly.    Adrenals: Unremarkable.    Kidneys and ureters: [No evidence of hydronephrosis.  Normal enhancement.] There is a 4 mm stone in the midpole of the left kidney.    Bladder: Unremarkable.    Pelvic organs: Prostate brachytherapy  seeds are present.    Bowel: [Stomach appears moderately distended above and below the level of the diaphragm. Gastroesophageal junction appears positioned at or slightly below the level of the pylorus. Colonic diverticula are noted. No evidence of bowel obstruction.  No bowel wall thickening.] Appendix is not well-seen.    Vasculature: Aortic atherosclerosis is present.    Peritoneum: No free air. No significant free fluid.    Lymph nodes: Unremarkable.    Soft tissues: Unremarkable.    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    Impression:    1. Moderate-sized hiatal hernia with distended stomach, underlying gastric volvulus is difficult to entirely exclude and clinical correlation recommended. Follow-up study with oral contrast may helpful as appropriate.  2. Otherwise, no obvious acute process within the abdomen or pelvis.    Electronically signed by:  Amos Martinez MD  9/22/2022 12:08 AM CDT Workstation: [id number]    and CT ABDOMEN PELVIS WITHOUT CONTRAST: No results found for this visit on 09/21/22.    Pending Diagnostic Studies:     None         Medications:  Reconciled Home Medications:      Medication List      You have not been prescribed any medications.         Indwelling Lines/Drains at time of discharge:   Lines/Drains/Airways     Drain  Duration                Ureteral Drain/Stent 02/11/21 Left ureter 6 Fr. 588 days         NG/OG Tube 09/22/22 0300 16 Fr. Right nostril <1 day                Time spent on the discharge of patient: 40 minutes         Ena Vega MD  Department of Hospital Medicine  [facility]

## 2022-09-22 NOTE — PLAN OF CARE
[facility]  Initial Discharge Assessment       Primary Care Provider: Francis Teague MD    Admission Diagnosis: Acute gastric volvulus [K31.89]    Admission Date: 9/21/2022    Discharge assessment completed with pt at bedside. Information verified as correct on facesheet. Denies HPOA- NOK pt's spouse, Tamara Sepulveda [phone]. Reports independence in ADLs. Denies HH/HD/DME at home. Discharge plan is home. Spouse to provide transport on discharge. No discharge needs identified at this time.     Discharge Barriers Identified: None    Payor: [employer] MANAGED MEDICARE / Plan: [employer] MEDICARE HMO / Product Type: Capitation /     Extended Emergency Contact Information  Primary Emergency Contact: Tamara Sepulveda  Address: [address] [place] [address]  Home Phone: [phone]  Mobile Phone: [phone]  Relation: Spouse  Preferred language: English   needed? No    Discharge Plan A: Home  Discharge Plan B: Home with family      [id number] - [place] - [address] [facility] [address]  [address] [place] [address]  Phone: [phone] Fax: [phone]      Initial Assessment (most recent)       Adult Discharge Assessment - 09/22/22 1013          Discharge Assessment    Assessment Type Discharge Planning Assessment     Confirmed/corrected address, phone number and insurance Yes     Confirmed Demographics Correct on Facesheet     Source of Information patient     Communicated DAVID with patient/caregiver Yes     Reason For Admission abd pain     Lives With spouse     Facility Arrived From: home     Do you expect to return to your current living situation? Yes     Do you have help at home or someone to help you manage your care at home? Yes     Who are your caregiver(s) and their phone number(s)? independent in care/spouse     Prior to hospitilization cognitive status: Alert/Oriented     Current  cognitive status: Alert/Oriented     Walking or Climbing Stairs Difficulty none     Dressing/Bathing Difficulty none     Equipment Currently Used at Home none     Readmission within 30 days? No     Patient currently being followed by outpatient case management? No     Do you currently have service(s) that help you manage your care at home? No     Do you take prescription medications? Yes     Do you have prescription coverage? Yes     Coverage [employer]     Do you have any problems affording any of your prescribed medications? No     Is the patient taking medications as prescribed? yes     Who is going to help you get home at discharge? independent in care/spouse     How do you get to doctors appointments? car, drives self     Are you on dialysis? No     Do you take coumadin? No     Discharge Plan A Home     Discharge Plan B Home with family     DME Needed Upon Discharge  none     Discharge Plan discussed with: Patient;Spouse/sig other     Discharge Barriers Identified None        Physical Activity    On average, how many days per week do you engage in moderate to strenuous exercise (like a brisk walk)? 7 days     On average, how many minutes do you engage in exercise at this level? 60 min        Financial Resource Strain    How hard is it for you to pay for the very basics like food, housing, medical care, and heating? Not very hard        Housing Stability    In the last 12 months, was there a time when you were not able to pay the mortgage or rent on time? No     In the last 12 months, how many places have you lived? 1     In the last 12 months, was there a time when you did not have a steady place to sleep or slept in a shelter (including now)? No        Transportation Needs    In the past 12 months, has lack of transportation kept you from medical appointments or from getting medications? No     In the past 12 months, has lack of transportation kept you from meetings, work, or from getting things  needed for daily living? No        Food Insecurity    Within the past 12 months, you worried that your food would run out before you got the money to buy more. Never true     Within the past 12 months, the food you bought just didn't last and you didn't have money to get more. Never true        Stress    Do you feel stress - tense, restless, nervous, or anxious, or unable to sleep at night because your mind is troubled all the time - these days? Only a little        Social Connections    In a typical week, how many times do you talk on the phone with family, friends, or neighbors? More than three times a week     How often do you get together with friends or relatives? Once a week     How often do you attend Quaker or Rastafarian services? Never     Do you belong to any clubs or organizations such as Quaker groups, unions, fraternal or athletic groups, or school groups? No     How often do you attend meetings of the clubs or organizations you belong to? Never     Are you , , , , never , or living with a partner?

## 2022-09-22 NOTE — SUBJECTIVE & OBJECTIVE
Past Medical History:   Diagnosis Date    Family history of malignant neoplasm of prostate 8/14/2011    Gastroesophageal reflux disease with hiatal hernia     History of prostate cancer 1/7/2016    Hyperlipidemia     Kidney stones     Multinodular goiter     no medications    Prostate cancer 10/20/2014    Prostate disorder     Dr Santos        Past Surgical History:   Procedure Laterality Date    APPENDECTOMY      BLADDER FULGURATION Left 3/13/2019    Procedure: FULGURATION, BLADDER;  Surgeon: Adin Santos MD;  Location: [facility];  Service: Urology;  Laterality: Left;  tumor    COLONOSCOPY  3/26/2008    5-10 year recheck per Dr. Borja (normal exam)    CYSTOSCOPY N/A 7/9/2019    Procedure: CYSTOSCOPY;  Surgeon: Kermit Beltran MD;  Location: [facility];  Service: Urology;  Laterality: N/A;    CYSTOSCOPY N/A 10/29/2019    Procedure: CYSTOSCOPY;  Surgeon: Kermit Beltran MD;  Location: [facility];  Service: Urology;  Laterality: N/A;  text pt if he doesnt answer.  Collect urine    CYSTOSCOPY N/A 7/8/2020    Procedure: CYSTOSCOPY;  Surgeon: Kermit Beltran MD;  Location: [facility];  Service: Urology;  Laterality: N/A;    CYSTOSCOPY N/A 1/12/2021    Procedure: CYSTOSCOPY;  Surgeon: Kermit Beltran MD;  Location: [facility];  Service: Urology;  Laterality: N/A;    CYSTOSCOPY W/ URETERAL STENT REMOVAL Left 3/2/2021    Procedure: CYSTOSCOPY, WITH URETERAL STENT REMOVAL;  Surgeon: Kermit Beltran MD;  Location: [facility];  Service: Urology;  Laterality: Left;    CYSTOSCOPY WITH BIOPSY OF BLADDER N/A 11/14/2019    Procedure: CYSTOSCOPY, WITH BLADDER BIOPSY, WITH FULGURATION IF INDICATED;  Surgeon: Kermit Beltran MD;  Location: [facility] OR;  Service: Urology;  Laterality: N/A;    CYSTOSCOPY WITH URETEROSCOPY, RETROGRADE PYELOGRAPHY, AND INSERTION OF STENT Left 1/28/2021    Procedure: CYSTOSCOPY, WITH RETROGRADE PYELOGRAM AND URETERAL STENT INSERTION;  Surgeon: Kermit Beltran MD;  Location: [facility];  Service:  Urology;  Laterality: Left;    CYSTOSCOPY WITH URETEROSCOPY, RETROGRADE PYELOGRAPHY, AND INSERTION OF STENT Left 2021    Procedure: CYSTOSCOPY, WITH RETROGRADE PYELOGRAM AND URETERAL STENT INSERTION/exchange;  Surgeon: Kermit Beltran MD;  Location: [facility] OR;  Service: Urology;  Laterality: Left;    DILATION OF URETHRA N/A 2019    Procedure: DILATION, URETHRA;  Surgeon: Kermit Beltran MD;  Location: [facility] OR;  Service: Urology;  Laterality: N/A;    DILATION OF URETHRA  2021    Procedure: DILATION, URETHRA;  Surgeon: Kermit Beltran MD;  Location: [facility] OR;  Service: Urology;;    esphogus scope      FLEXIBLE CYSTOSCOPY N/A 2019    Procedure: CYSTOSCOPY, FLEXIBLE;  Surgeon: Adin Santos MD;  Location: [facility];  Service: Urology;  Laterality: N/A;    LASER LITHOTRIPSY Left 2021    Procedure: LITHOTRIPSY, USING LASER;  Surgeon: Kermit Beltran MD;  Location: [facility] OR;  Service: Urology;  Laterality: Left;    leg repair Left     ORCHIECTOMY      spermatocele repair      URETEROSCOPIC REMOVAL OF URETERIC CALCULUS Left 2021    Procedure: REMOVAL, CALCULUS, URETER, URETEROSCOPIC;  Surgeon: Kermit Beltran MD;  Location: [facility] OR;  Service: Urology;  Laterality: Left;       Review of patient's allergies indicates:   Allergen Reactions    Penicillins Rash     Other reaction(s): Rash       Current Facility-Administered Medications on File Prior to Encounter   Medication    electrolyte-S (ISOLYTE)    electrolyte-S (ISOLYTE)    [DISCONTINUED] ondansetron injection 4 mg     No current outpatient medications on file prior to encounter.     Family History       Problem Relation (Age of Onset)    COPD Father    Cancer Father, Maternal Grandmother, Paternal Grandfather    Diabetes Sister    Hyperlipidemia Sister    Hypertension Mother, Sister    Stroke Mother (94)          Tobacco Use    Smoking status: Former     Types: Cigarettes     Quit date: 1976     Years since quittin.6     Smokeless tobacco: Never   Substance and Sexual Activity    Alcohol use: Yes     Alcohol/week: 3.0 standard drinks     Types: 3 Glasses of wine per week     Comment: weekly    Drug use: No    Sexual activity: Yes     Partners: Female     Review of Systems   Constitutional:  Negative for activity change and appetite change.   HENT:  Negative for congestion and dental problem.    Eyes:  Negative for discharge and itching.   Respiratory:  Negative for shortness of breath.    Cardiovascular:  Negative for chest pain.   Gastrointestinal:  Positive for abdominal pain, nausea and vomiting. Negative for abdominal distention.   Endocrine: Negative for cold intolerance.   Genitourinary:  Negative for difficulty urinating and dysuria.   Musculoskeletal:  Negative for arthralgias and back pain.   Skin:  Negative for color change.   Neurological:  Negative for dizziness and facial asymmetry.   Hematological:  Negative for adenopathy.   Psychiatric/Behavioral:  Negative for agitation and behavioral problems.    Objective:     Vital Signs (Most Recent):  Temp: 97.9 °F (36.6 °C) (09/22/22 0445)  Pulse: 88 (09/22/22 0445)  Resp: 20 (09/22/22 0445)  BP: (!) 158/89 (09/22/22 0445)  SpO2: 98 % (09/22/22 0445)   Vital Signs (24h Range):  Temp:  [97.3 °F (36.3 °C)-97.9 °F (36.6 °C)] 97.9 °F (36.6 °C)  Pulse:  [62-98] 88  Resp:  [18-20] 20  SpO2:  [95 %-98 %] 98 %  BP: (157-189)/() 158/89     Weight: 75 kg (165 lb 5.5 oz)  Body mass index is 24.42 kg/m².    Physical Exam  Vitals and nursing note reviewed.   Constitutional:       Appearance: He is well-developed.   HENT:      Head: Atraumatic.      Right Ear: External ear normal.      Left Ear: External ear normal.      Nose: Nose normal.      Mouth/Throat:      Mouth: Mucous membranes are moist.   Eyes:      General: No scleral icterus.  Cardiovascular:      Rate and Rhythm: Normal rate.   Pulmonary:      Effort: Pulmonary effort is normal.   Abdominal:      General: There is no  distension.   Musculoskeletal:         General: Normal range of motion.      Cervical back: Full passive range of motion without pain and normal range of motion.   Skin:     General: Skin is warm.   Neurological:      Mental Status: He is alert and oriented to person, place, and time.   Psychiatric:         Behavior: Behavior normal.           Significant Labs: All pertinent labs within the past 24 hours have been reviewed.  CBC:   Recent Labs   Lab 09/21/22  2309   WBC 6.37   HGB 15.6   HCT 46.8        CMP:   Recent Labs   Lab 09/21/22  2309      K 3.9      CO2 25   *   BUN 24*   CREATININE 0.9   CALCIUM 9.3   PROT 7.5   ALBUMIN 4.4   BILITOT 1.0   ALKPHOS 64   AST 20   ALT 22   ANIONGAP 10       Significant Imaging: I have reviewed all pertinent imaging results/findings within the past 24 hours.

## 2022-09-22 NOTE — ED PROVIDER NOTES
Encounter Date: 9/21/2022       History     Chief Complaint   Patient presents with    Abdominal Pain     LLQ pain started today    Nausea     67-year-old male with a history of prostate cancer, nephrolithiasis, hyperlipidemia, hiatal hernia presents the emergency department with left-sided abdominal pain.  The patient tells me that he had a cheese sandwich for dinner.  Shortly thereafter, around 7 PM, he developed left-sided chest and epigastric abdominal pain.  The pain that radiated into his left mid lower abdomen.  Pain is moderate to severe in nature and is associated with nausea but no vomiting. He had a normal BM today and is passing gas. He has no urinary symptoms. No fever. PSHx of appendectomy, multiple cystoscopies, ureteroscopy.    Review of patient's allergies indicates:   Allergen Reactions    Penicillins Rash     Other reaction(s): Rash     Past Medical History:   Diagnosis Date    Family history of malignant neoplasm of prostate 8/14/2011    Gastroesophageal reflux disease with hiatal hernia     History of prostate cancer 1/7/2016    Hyperlipidemia     Kidney stones     Multinodular goiter     no medications    Prostate cancer 10/20/2014    Prostate disorder     Dr Santos      Past Surgical History:   Procedure Laterality Date    APPENDECTOMY      BLADDER FULGURATION Left 3/13/2019    Procedure: FULGURATION, BLADDER;  Surgeon: Adin Santos MD;  Location: [facility] OR;  Service: Urology;  Laterality: Left;  tumor    COLONOSCOPY  3/26/2008    5-10 year recheck per Dr. Borja (normal exam)    CYSTOSCOPY N/A 7/9/2019    Procedure: CYSTOSCOPY;  Surgeon: Kermit Beltran MD;  Location: [facility] OR;  Service: Urology;  Laterality: N/A;    CYSTOSCOPY N/A 10/29/2019    Procedure: CYSTOSCOPY;  Surgeon: Kermit Beltran MD;  Location: [facility] OR;  Service: Urology;  Laterality: N/A;  text pt if he doesnt answer.  Collect urine    CYSTOSCOPY N/A 7/8/2020    Procedure: CYSTOSCOPY;  Surgeon: Kermit Beltran MD;   Location: [facility] OR;  Service: Urology;  Laterality: N/A;    CYSTOSCOPY N/A 1/12/2021    Procedure: CYSTOSCOPY;  Surgeon: Kermit Beltran MD;  Location: [facility] OR;  Service: Urology;  Laterality: N/A;    CYSTOSCOPY W/ URETERAL STENT REMOVAL Left 3/2/2021    Procedure: CYSTOSCOPY, WITH URETERAL STENT REMOVAL;  Surgeon: Kermit Beltran MD;  Location: [facility] OR;  Service: Urology;  Laterality: Left;    CYSTOSCOPY WITH BIOPSY OF BLADDER N/A 11/14/2019    Procedure: CYSTOSCOPY, WITH BLADDER BIOPSY, WITH FULGURATION IF INDICATED;  Surgeon: Kermit Beltran MD;  Location: [facility] OR;  Service: Urology;  Laterality: N/A;    CYSTOSCOPY WITH URETEROSCOPY, RETROGRADE PYELOGRAPHY, AND INSERTION OF STENT Left 1/28/2021    Procedure: CYSTOSCOPY, WITH RETROGRADE PYELOGRAM AND URETERAL STENT INSERTION;  Surgeon: Kermit Beltran MD;  Location: [facility] OR;  Service: Urology;  Laterality: Left;    CYSTOSCOPY WITH URETEROSCOPY, RETROGRADE PYELOGRAPHY, AND INSERTION OF STENT Left 2/11/2021    Procedure: CYSTOSCOPY, WITH RETROGRADE PYELOGRAM AND URETERAL STENT INSERTION/exchange;  Surgeon: Kermit Beltran MD;  Location: [facility] OR;  Service: Urology;  Laterality: Left;    DILATION OF URETHRA N/A 7/25/2019    Procedure: DILATION, URETHRA;  Surgeon: Kermit Beltran MD;  Location: [facility] OR;  Service: Urology;  Laterality: N/A;    DILATION OF URETHRA  1/12/2021    Procedure: DILATION, URETHRA;  Surgeon: Kermit Beltran MD;  Location: [facility] OR;  Service: Urology;;    esphogus scope      FLEXIBLE CYSTOSCOPY N/A 2/13/2019    Procedure: CYSTOSCOPY, FLEXIBLE;  Surgeon: Adin Santos MD;  Location: [facility] OR;  Service: Urology;  Laterality: N/A;    LASER LITHOTRIPSY Left 2/11/2021    Procedure: LITHOTRIPSY, USING LASER;  Surgeon: Kermit Beltran MD;  Location: [facility] OR;  Service: Urology;  Laterality: Left;    leg repair Left     ORCHIECTOMY      spermatocele repair      URETEROSCOPIC REMOVAL OF URETERIC CALCULUS Left 2/11/2021     Procedure: REMOVAL, CALCULUS, URETER, URETEROSCOPIC;  Surgeon: Kermit Beltran MD;  Location: [facility];  Service: Urology;  Laterality: Left;     Family History   Problem Relation Age of Onset    Hypertension Mother     Stroke Mother 94    COPD Father     Cancer Father         prostate    Hypertension Sister     Diabetes Sister     Hyperlipidemia Sister     Cancer Maternal Grandmother     Cancer Paternal Grandfather         prostate     Social History     Tobacco Use    Smoking status: Former     Types: Cigarettes     Quit date: 1976     Years since quittin.6    Smokeless tobacco: Never   Substance Use Topics    Alcohol use: Yes     Alcohol/week: 3.0 standard drinks     Types: 3 Glasses of wine per week     Comment: weekly    Drug use: No     Review of Systems   Constitutional:  Negative for fever.   HENT:  Negative for sore throat.    Respiratory:  Negative for shortness of breath.    Cardiovascular:  Positive for chest pain.   Gastrointestinal:  Positive for abdominal pain and nausea. Negative for constipation, diarrhea and vomiting.   Genitourinary:  Negative for dysuria and frequency.   Musculoskeletal:  Negative for back pain.   Skin:  Negative for rash.   Neurological:  Negative for weakness.   Hematological:  Does not bruise/bleed easily.   All other systems reviewed and are negative.    Physical Exam     Initial Vitals [22 2212]   BP Pulse Resp Temp SpO2   (!) 161/117 66 18 97.3 °F (36.3 °C) 96 %      MAP       --         Physical Exam    Nursing note and vitals reviewed.  Constitutional: He appears well-developed and well-nourished. He appears distressed.   HENT:   Head: Normocephalic and atraumatic.   Eyes: EOM are normal.   Neck: Neck supple.   Normal range of motion.  Cardiovascular:  Normal rate, regular rhythm, normal heart sounds and intact distal pulses.           No murmur heard.  Pulmonary/Chest: Breath sounds normal. He has no wheezes. He has no rhonchi. He has no rales.    Abdominal: Abdomen is soft. Bowel sounds are normal. He exhibits no distension. There is abdominal tenderness (L mid and lower quad). There is guarding. There is no rebound.   Genitourinary:    Genitourinary Comments: No CVA tenderness     Musculoskeletal:         General: Normal range of motion.      Cervical back: Normal range of motion and neck supple.     Neurological: He is alert and oriented to person, place, and time.   Skin: Skin is warm and dry. Capillary refill takes less than 2 seconds.   Psychiatric: He has a normal mood and affect.     ED Course   Critical Care    Date/Time: 9/22/2022 6:40 AM  Performed by: Татьяна Garcia MD  Authorized by: Татьяна Garcia MD   Direct patient critical care time: 15 minutes  Additional history critical care time: 5 minutes  Ordering / reviewing critical care time: 5 minutes  Documentation critical care time: 5 minutes  Consulting other physicians critical care time: 5 minutes  Total critical care time (exclusive of procedural time) : 35 minutes  Critical care time was exclusive of separately billable procedures and treating other patients and teaching time.  Critical care was necessary to treat or prevent imminent or life-threatening deterioration of the following conditions: gastric volvulus.  Critical care was time spent personally by me on the following activities: discussions with consultants, development of treatment plan with patient or surrogate, evaluation of patient's response to treatment, examination of patient, obtaining history from patient or surrogate, ordering and performing treatments and interventions, ordering and review of laboratory studies, ordering and review of radiographic studies, pulse oximetry, re-evaluation of patient's condition and review of old charts.      Labs Reviewed   CBC W/ AUTO DIFFERENTIAL - Abnormal; Notable for the following components:       Result Value    Lymph # 0.8 (*)     Gran % 80.4 (*)     Lymph % 12.2 (*)      All other components within normal limits   COMPREHENSIVE METABOLIC PANEL - Abnormal; Notable for the following components:    Glucose 142 (*)     BUN 24 (*)     All other components within normal limits   URINALYSIS, REFLEX TO URINE CULTURE - Abnormal; Notable for the following components:    Protein, UA Trace (*)     Ketones, UA 2+ (*)     All other components within normal limits    Narrative:     Specimen Source->Urine   LIPASE   LACTIC ACID, PLASMA   TROPONIN I   SARS-COV-2 RNA AMPLIFICATION, QUAL   CBC WITHOUT DIFFERENTIAL   COMPREHENSIVE METABOLIC PANEL   ISTAT CREATININE     EKG Readings: (Independently Interpreted)   EKG is normal sinus rhythm a with no ST elevation or depression, normal intervals, isolated T-wave inversion in lead 3 which is new from prior EKG   ECG Results              EKG 12-lead (In process)  Result time 09/22/22 05:39:00      In process by Interface, Lab In [facility] (09/22/22 05:39:00)                   Narrative:    Test Reason : R10.13,    Vent. Rate : 071 BPM     Atrial Rate : 071 BPM     P-R Int : 160 ms          QRS Dur : 084 ms      QT Int : 410 ms       P-R-T Axes : 047 -20 019 degrees     QTc Int : 445 ms    Normal sinus rhythm with sinus arrhythmia  Normal ECG  When compared with ECG of 28-JAN-2021 11:58,  No significant change was found    Referred By: [email or web address]   SELF           Confirmed By:                                   Imaging Results              X-Ray Abdomen AP 1 View (KUB) (Final result)  Result time 09/22/22 06:23:34      Final result by Cristino Lundberg MD (09/22/22 06:23:34)                   Narrative:    KUB    CLINICAL DATA: Nasogastric tube placement    FINDINGS: Comparison to April 2022. Moderate-sized hiatal hernia is noted. Nasogastric tube tip projects at the gastric lumen just distal to the hernia. Bowel gas pattern is nonobstructive. No mass, suspicious calcification, or free air is identified. Small amounts of contrast are noted in the bilateral  renal collecting systems.    IMPRESSION:  1. Nasogastric tube tip projects at the gastric lumen just distal to moderate sized hiatal hernia.    Electronically signed by:  Cristino Lundberg MD  9/22/2022 6:23 AM CDT Workstation: [id number]                                     CT Abdomen Pelvis With Contrast (Final result)  Result time 09/22/22 00:08:08      Final result by Amos Martinez MD (09/22/22 00:08:08)                   Narrative:    CT ABDOMEN PELVIS WITH IV CONTRAST    Exam date: September 21, 2022    Comparison: CT abdomen pelvis January 23, 2021    Indication: Left lower quadrant abdominal pain    Technique:    Multiple helical axial images were obtained through the abdomen and pelvis using intravenous contrast. Coronal and sagittal reformatted images were obtained.    All CT scans at this facility use dose modulation, iterative reconstruction, and/or weight-based dosing when appropriate to reduce radiation dose to as low as reasonably achievable.    Findings:    Lung bases: [There is moderate size hiatal hernia]. There are streaky opacities in the lung bases suggesting atelectasis.    Liver: [Homogenous attenuation is noted.] A few subcentimeter hypodensities in the liver are present too small to characterize.    Gallbladder/biliary: [Appears unremarkable]    Pancreas: [Unremarkable.  No evidence of ductal enlargement.]    Spleen: Appears unremarkable. No splenomegaly.    Adrenals: Unremarkable.    Kidneys and ureters: [No evidence of hydronephrosis.  Normal enhancement.] There is a 4 mm stone in the midpole of the left kidney.    Bladder: Unremarkable.    Pelvic organs: Prostate brachytherapy seeds are present.    Bowel: [Stomach appears moderately distended above and below the level of the diaphragm. Gastroesophageal junction appears positioned at or slightly below the level of the pylorus. Colonic diverticula are noted. No evidence of bowel obstruction.  No bowel wall thickening.] Appendix is not  well-seen.    Vasculature: Aortic atherosclerosis is present.    Peritoneum: No free air. No significant free fluid.    Lymph nodes: Unremarkable.    Soft tissues: Unremarkable.    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    Impression:    1. Moderate-sized hiatal hernia with distended stomach, underlying gastric volvulus is difficult to entirely exclude and clinical correlation recommended. Follow-up study with oral contrast may helpful as appropriate.  2. Otherwise, no obvious acute process within the abdomen or pelvis.    Electronically signed by:  Amos Martinez MD  9/22/2022 12:08 AM CDT Workstation: [id number]                                     Medications   pantoprazole injection 40 mg (has no administration in time range)   0.9%  NaCl infusion ( Intravenous New Bag 9/22/22 0338)   potassium chloride 10 mEq in 100 mL IVPB (has no administration in time range)     And   potassium chloride 10 mEq in 100 mL IVPB (has no administration in time range)     And   potassium chloride 10 mEq in 100 mL IVPB (has no administration in time range)   magnesium sulfate 2g in water 50mL IVPB (premix) (has no administration in time range)   magnesium sulfate 2g in water 50mL IVPB (premix) (has no administration in time range)   sodium phosphate 15 mmol in dextrose 5 % 250 mL IVPB (has no administration in time range)   sodium phosphate 20.01 mmol in dextrose 5 % 250 mL IVPB (has no administration in time range)   sodium phosphate 30 mmol in dextrose 5 % 250 mL IVPB (has no administration in time range)   calcium gluconate 1 g in NS IVPB (premixed) (has no administration in time range)   calcium gluconate 1 g in NS IVPB (premixed) (has no administration in time range)   calcium gluconate 1 g in NS IVPB (premixed) (has no administration in time range)   ondansetron injection 4 mg (4 mg Intravenous Given 9/22/22 0532)   hydrALAZINE injection 10 mg (has no administration in time range)   ondansetron injection 4 mg  (has no administration in time range)   HYDROmorphone injection 0.5 mg (has no administration in time range)   morphine injection 4 mg (4 mg Intravenous Given 9/21/22 2305)   ondansetron injection 4 mg (4 mg Intravenous Given 9/21/22 2306)   iohexoL (OMNIPAQUE 350) injection 100 mL (100 mLs Intravenous Given 9/21/22 2320)   morphine injection 4 mg (4 mg Intravenous Given 9/22/22 0027)     Medical Decision Making:   ED Management:  67-year-old male presents to emergency department with chest and abdominal pain.  He is hypertensive but afebrile.  He has a normal white count and normal lactic acid.  CT scan is concerning for acute gastric volvulus.  Again the patient is hemodynamically stable.  NG tube was placed and the patient was given morphine.  He does tell me that his symptoms have somewhat improved.  Discussed the case with Dr. Bryant who agrees with conservative management for now and will evaluate the patient in the hospital.  Hospital Medicine to admit.    Татьяна Garcia MD  Emergency Medicine  09/22/2022 6:42 AM                            Clinical Impression:   Final diagnoses:  [R10.13] Epigastric abdominal pain  [K31.89] Acute gastric volvulus (Primary)        ED Disposition Condition    Admit Stable                Татьяна Garcia MD  09/22/22 [id number]

## 2022-09-22 NOTE — HPI
67 year old patient getting admitted with acute gastric volvulus  Pt started having severe abdominal pain hrs before he came to ER  Was having generalized severe pain / no radiation   Pian was associated with several episodes of red color vomitus  In ER NG tube was inserted with resolution of most of his symptoms  Patients Mother had intestinal volvulus in the past

## 2022-09-23 ENCOUNTER — PATIENT MESSAGE (OUTPATIENT)
Dept: SURGERY | Facility: CLINIC | Age: 67
End: 2022-09-23
Payer: MEDICARE

## 2022-09-23 ENCOUNTER — PATIENT MESSAGE (OUTPATIENT)
Dept: FAMILY MEDICINE | Facility: CLINIC | Age: 67
End: 2022-09-23
Payer: MEDICARE

## 2022-09-23 NOTE — PLAN OF CARE
09/23/22 1132   Final Note   Assessment Type Final Discharge Note   Anticipated Discharge Disposition Home   Post-Acute Status   Discharge Delays None known at this time

## 2022-09-23 NOTE — TELEPHONE ENCOUNTER
Call placed to patient to assist with scheduling ER follow up appointment. Patient states he is scheduled to see Dr. Mcmahan on Monday; and does not think it is necessary to schedule an appointment with family medicine. States he would like to send message to Dr. Teague for notification that he was seen at ER and will be having surgery next week. Please be advised. Thank you.

## 2022-09-26 ENCOUNTER — PATIENT MESSAGE (OUTPATIENT)
Dept: SURGERY | Facility: HOSPITAL | Age: 67
End: 2022-09-26
Payer: MEDICARE

## 2022-09-26 ENCOUNTER — OFFICE VISIT (OUTPATIENT)
Dept: SURGERY | Facility: CLINIC | Age: 67
End: 2022-09-26
Payer: MEDICARE

## 2022-09-26 VITALS
DIASTOLIC BLOOD PRESSURE: 73 MMHG | TEMPERATURE: 98 F | HEIGHT: 69 IN | BODY MASS INDEX: 23.25 KG/M2 | SYSTOLIC BLOOD PRESSURE: 126 MMHG | HEART RATE: 71 BPM | WEIGHT: 156.94 LBS

## 2022-09-26 DIAGNOSIS — K31.89 GASTRIC VOLVULUS: Primary | ICD-10-CM

## 2022-09-26 PROCEDURE — 1159F MED LIST DOCD IN RCRD: CPT | Mod: CPTII,S$GLB,, | Performed by: STUDENT IN AN ORGANIZED HEALTH CARE EDUCATION/TRAINING PROGRAM

## 2022-09-26 PROCEDURE — 99999 PR PBB SHADOW E&M-EST. PATIENT-LVL IV: CPT | Mod: PBBFAC,,, | Performed by: STUDENT IN AN ORGANIZED HEALTH CARE EDUCATION/TRAINING PROGRAM

## 2022-09-26 PROCEDURE — 3074F SYST BP LT 130 MM HG: CPT | Mod: CPTII,S$GLB,, | Performed by: STUDENT IN AN ORGANIZED HEALTH CARE EDUCATION/TRAINING PROGRAM

## 2022-09-26 PROCEDURE — 1111F PR DISCHARGE MEDS RECONCILED W/ CURRENT OUTPATIENT MED LIST: ICD-10-PCS | Mod: CPTII,S$GLB,, | Performed by: STUDENT IN AN ORGANIZED HEALTH CARE EDUCATION/TRAINING PROGRAM

## 2022-09-26 PROCEDURE — 3288F FALL RISK ASSESSMENT DOCD: CPT | Mod: CPTII,S$GLB,, | Performed by: STUDENT IN AN ORGANIZED HEALTH CARE EDUCATION/TRAINING PROGRAM

## 2022-09-26 PROCEDURE — 1101F PT FALLS ASSESS-DOCD LE1/YR: CPT | Mod: CPTII,S$GLB,, | Performed by: STUDENT IN AN ORGANIZED HEALTH CARE EDUCATION/TRAINING PROGRAM

## 2022-09-26 PROCEDURE — 1126F PR PAIN SEVERITY QUANTIFIED, NO PAIN PRESENT: ICD-10-PCS | Mod: CPTII,S$GLB,, | Performed by: STUDENT IN AN ORGANIZED HEALTH CARE EDUCATION/TRAINING PROGRAM

## 2022-09-26 PROCEDURE — 3288F PR FALLS RISK ASSESSMENT DOCUMENTED: ICD-10-PCS | Mod: CPTII,S$GLB,, | Performed by: STUDENT IN AN ORGANIZED HEALTH CARE EDUCATION/TRAINING PROGRAM

## 2022-09-26 PROCEDURE — 99999 PR PBB SHADOW E&M-EST. PATIENT-LVL IV: ICD-10-PCS | Mod: PBBFAC,,, | Performed by: STUDENT IN AN ORGANIZED HEALTH CARE EDUCATION/TRAINING PROGRAM

## 2022-09-26 PROCEDURE — 3008F PR BODY MASS INDEX (BMI) DOCUMENTED: ICD-10-PCS | Mod: CPTII,S$GLB,, | Performed by: STUDENT IN AN ORGANIZED HEALTH CARE EDUCATION/TRAINING PROGRAM

## 2022-09-26 PROCEDURE — 99213 OFFICE O/P EST LOW 20 MIN: CPT | Mod: S$GLB,,, | Performed by: STUDENT IN AN ORGANIZED HEALTH CARE EDUCATION/TRAINING PROGRAM

## 2022-09-26 PROCEDURE — 1159F PR MEDICATION LIST DOCUMENTED IN MEDICAL RECORD: ICD-10-PCS | Mod: CPTII,S$GLB,, | Performed by: STUDENT IN AN ORGANIZED HEALTH CARE EDUCATION/TRAINING PROGRAM

## 2022-09-26 PROCEDURE — 3074F PR MOST RECENT SYSTOLIC BLOOD PRESSURE < 130 MM HG: ICD-10-PCS | Mod: CPTII,S$GLB,, | Performed by: STUDENT IN AN ORGANIZED HEALTH CARE EDUCATION/TRAINING PROGRAM

## 2022-09-26 PROCEDURE — 1126F AMNT PAIN NOTED NONE PRSNT: CPT | Mod: CPTII,S$GLB,, | Performed by: STUDENT IN AN ORGANIZED HEALTH CARE EDUCATION/TRAINING PROGRAM

## 2022-09-26 PROCEDURE — 1101F PR PT FALLS ASSESS DOC 0-1 FALLS W/OUT INJ PAST YR: ICD-10-PCS | Mod: CPTII,S$GLB,, | Performed by: STUDENT IN AN ORGANIZED HEALTH CARE EDUCATION/TRAINING PROGRAM

## 2022-09-26 PROCEDURE — 3078F DIAST BP <80 MM HG: CPT | Mod: CPTII,S$GLB,, | Performed by: STUDENT IN AN ORGANIZED HEALTH CARE EDUCATION/TRAINING PROGRAM

## 2022-09-26 PROCEDURE — 3008F BODY MASS INDEX DOCD: CPT | Mod: CPTII,S$GLB,, | Performed by: STUDENT IN AN ORGANIZED HEALTH CARE EDUCATION/TRAINING PROGRAM

## 2022-09-26 PROCEDURE — 3078F PR MOST RECENT DIASTOLIC BLOOD PRESSURE < 80 MM HG: ICD-10-PCS | Mod: CPTII,S$GLB,, | Performed by: STUDENT IN AN ORGANIZED HEALTH CARE EDUCATION/TRAINING PROGRAM

## 2022-09-26 PROCEDURE — 1111F DSCHRG MED/CURRENT MED MERGE: CPT | Mod: CPTII,S$GLB,, | Performed by: STUDENT IN AN ORGANIZED HEALTH CARE EDUCATION/TRAINING PROGRAM

## 2022-09-26 PROCEDURE — 99213 PR OFFICE/OUTPT VISIT, EST, LEVL III, 20-29 MIN: ICD-10-PCS | Mod: S$GLB,,, | Performed by: STUDENT IN AN ORGANIZED HEALTH CARE EDUCATION/TRAINING PROGRAM

## 2022-09-26 RX ORDER — SODIUM CHLORIDE 9 MG/ML
INJECTION, SOLUTION INTRAVENOUS CONTINUOUS
Status: CANCELLED | OUTPATIENT
Start: 2022-09-30

## 2022-09-26 NOTE — H&P (VIEW-ONLY)
History & Physical    SUBJECTIVE:     History of Present Illness:  Patient is a 67 y.o. male presents in follow-up after recent inpatient hospitalization.  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  He had an NG-tube placed which decompress the stomach.  Follow-up esophagram showed patency.  The tube was removed after he was asymptomatic and patient was able to tolerate liquids without problems.  He has been tolerating liquids at home without issue.  He re-presented to discuss robotic hiatal hernia repair.    Chief Complaint   Patient presents with    Hernia       Review of patient's allergies indicates:   Allergen Reactions    Penicillins Rash     Other reaction(s): Rash       No current outpatient medications on file.     No current facility-administered medications for this visit.     Facility-Administered Medications Ordered in Other Visits   Medication Dose Route Frequency Provider Last Rate Last Admin    electrolyte-S (ISOLYTE)   Intravenous Continuous Gume Guzman MD 10 mL/hr at 01/28/21 1230 New Bag at 01/28/21 1548    electrolyte-S (ISOLYTE)   Intravenous Continuous Gume Guzman  mL/hr at 01/28/21 1615 Rate Change at 01/28/21 1615       Past Medical History:   Diagnosis Date    Family history of malignant neoplasm of prostate 8/14/2011    Gastroesophageal reflux disease with hiatal hernia     History of prostate cancer 1/7/2016    Hyperlipidemia     Kidney stones     Multinodular goiter     no medications    Prostate cancer 10/20/2014    Prostate disorder     Dr Santos      Past Surgical History:   Procedure Laterality Date    APPENDECTOMY      BLADDER FULGURATION Left 3/13/2019    Procedure: FULGURATION, BLADDER;  Surgeon: Adin Santos MD;  Location: [facility] OR;  Service: Urology;  Laterality: Left;  tumor    COLONOSCOPY  3/26/2008    5-10 year recheck per Dr. Borja (normal exam)    CYSTOSCOPY N/A 7/9/2019    Procedure: CYSTOSCOPY;  Surgeon: Kermit Beltran,  MD;  Location: [facility];  Service: Urology;  Laterality: N/A;    CYSTOSCOPY N/A 10/29/2019    Procedure: CYSTOSCOPY;  Surgeon: Kermit Beltran MD;  Location: [facility] OR;  Service: Urology;  Laterality: N/A;  text pt if he doesnt answer.  Collect urine    CYSTOSCOPY N/A 7/8/2020    Procedure: CYSTOSCOPY;  Surgeon: Kermit Beltran MD;  Location: [facility] OR;  Service: Urology;  Laterality: N/A;    CYSTOSCOPY N/A 1/12/2021    Procedure: CYSTOSCOPY;  Surgeon: Kermit Beltran MD;  Location: [facility] OR;  Service: Urology;  Laterality: N/A;    CYSTOSCOPY W/ URETERAL STENT REMOVAL Left 3/2/2021    Procedure: CYSTOSCOPY, WITH URETERAL STENT REMOVAL;  Surgeon: Kermit Beltran MD;  Location: [facility];  Service: Urology;  Laterality: Left;    CYSTOSCOPY WITH BIOPSY OF BLADDER N/A 11/14/2019    Procedure: CYSTOSCOPY, WITH BLADDER BIOPSY, WITH FULGURATION IF INDICATED;  Surgeon: Kermit Beltran MD;  Location: [facility];  Service: Urology;  Laterality: N/A;    CYSTOSCOPY WITH URETEROSCOPY, RETROGRADE PYELOGRAPHY, AND INSERTION OF STENT Left 1/28/2021    Procedure: CYSTOSCOPY, WITH RETROGRADE PYELOGRAM AND URETERAL STENT INSERTION;  Surgeon: Kermit Beltran MD;  Location: [facility];  Service: Urology;  Laterality: Left;    CYSTOSCOPY WITH URETEROSCOPY, RETROGRADE PYELOGRAPHY, AND INSERTION OF STENT Left 2/11/2021    Procedure: CYSTOSCOPY, WITH RETROGRADE PYELOGRAM AND URETERAL STENT INSERTION/exchange;  Surgeon: Kermit Beltran MD;  Location: [facility];  Service: Urology;  Laterality: Left;    DILATION OF URETHRA N/A 7/25/2019    Procedure: DILATION, URETHRA;  Surgeon: Kermit Beltran MD;  Location: [facility];  Service: Urology;  Laterality: N/A;    DILATION OF URETHRA  1/12/2021    Procedure: DILATION, URETHRA;  Surgeon: Kermit Beltran MD;  Location: [facility] OR;  Service: Urology;;    esphogus scope      FLEXIBLE CYSTOSCOPY N/A 2/13/2019    Procedure: CYSTOSCOPY, FLEXIBLE;  Surgeon: Adin Santos MD;  Location: [facility] OR;   "Service: Urology;  Laterality: N/A;    LASER LITHOTRIPSY Left 2021    Procedure: LITHOTRIPSY, USING LASER;  Surgeon: Kermit Beltran MD;  Location: [facility] OR;  Service: Urology;  Laterality: Left;    leg repair Left     ORCHIECTOMY      spermatocele repair      URETEROSCOPIC REMOVAL OF URETERIC CALCULUS Left 2021    Procedure: REMOVAL, CALCULUS, URETER, URETEROSCOPIC;  Surgeon: Kermit Beltran MD;  Location: [facility] OR;  Service: Urology;  Laterality: Left;     Family History   Problem Relation Age of Onset    Hypertension Mother     Stroke Mother 94    COPD Father     Cancer Father         prostate    Hypertension Sister     Diabetes Sister     Hyperlipidemia Sister     Cancer Maternal Grandmother     Cancer Paternal Grandfather         prostate     Social History     Tobacco Use    Smoking status: Former     Types: Cigarettes     Quit date: 1976     Years since quittin.7    Smokeless tobacco: Never   Substance Use Topics    Alcohol use: Yes     Alcohol/week: 3.0 standard drinks     Types: 3 Glasses of wine per week     Comment: weekly    Drug use: No        Review of Systems:  Review of Systems   Constitutional: Negative.  Negative for fatigue and fever.   HENT: Negative.     Eyes: Negative.    Respiratory: Negative.  Negative for shortness of breath.    Cardiovascular: Negative.  Negative for chest pain.   Gastrointestinal: Negative.    Endocrine: Negative.    Genitourinary: Negative.    Musculoskeletal: Negative.    Skin: Negative.    Allergic/Immunologic: Negative.    Neurological: Negative.    Hematological: Negative.    Psychiatric/Behavioral: Negative.       OBJECTIVE:     Vital Signs (Most Recent)  Temp: 98.1 °F (36.7 °C) (22 1353)  Pulse: 71 (22 1353)  BP: 126/73 (22 1353)  5' 9" (1.753 m)  71.2 kg (156 lb 15.5 oz)     Physical Exam:  Physical Exam  Constitutional:       General: He is not in acute distress.     Appearance: Normal appearance. He is not ill-appearing, " toxic-appearing or diaphoretic.   HENT:      Head: Normocephalic.      Nose: Nose normal.   Eyes:      Conjunctiva/sclera: Conjunctivae normal.   Cardiovascular:      Rate and Rhythm: Normal rate and regular rhythm.   Pulmonary:      Effort: Pulmonary effort is normal.   Abdominal:      Palpations: Abdomen is soft.   Musculoskeletal:         General: Normal range of motion.      Cervical back: Normal range of motion.   Skin:     General: Skin is warm.   Neurological:      General: No focal deficit present.      Mental Status: He is alert.   Psychiatric:         Mood and Affect: Mood normal.       Laboratory  All inpatient labs have been reviewed    Diagnostic Results:  Recent CT scan completed while inpatient and esophagram were reviewed with the patient    ASSESSMENT/PLAN:     67-year-old male with a gastric volvulus related to his hiatal hernia.  He is not obstructed.  The hernia appears to be incarcerated.  I am concerned of the possibility of strangulation given recent symptoms and recent inpatient admission.  We discussed the rationale behind proceeding with robotic hiatal hernia repair with fundoplication even without current symptoms.    PLAN:  Patient did consent to the planned procedure.  He requested that a De [place] be placed at the time of surgery given his issues with urinary retention.

## 2022-09-26 NOTE — PATIENT INSTRUCTIONS
Your procedure has been scheduled at:    [facility]pre-admit nurse  [phone]      DAY   Friday    DATE   September 30, 2022         Someone from the hospital will call you the evening before surgery to let you know what time you need to be at the hospital for surgery.                                               1:  DO NOT EAT OR DRINK ANYTHING AFTER MIDNIGHT THE NIGHT BEFORE SURGERY.     2:  You will need to stop any blood thinners 1 week prior to surgery.  This includes Aspirin, fish oil, Pradaxa, Coumadin, Plavix, Pletal.  Please contact the prescribing doctor to be sure it is ok to stop these medicines.    3:  Pre-admit nurse will go over your medicines and let you know which ones not to take the morning of surgery    4:  Plan to have someone drive you home after you are released from the hospital.  You WILL NOT be able to drive yourself.    5:  If you have any questions or need to change your surgery date, please call Jaime Myles or Shantal at [phone] or [phone]    6.  You may start taking a stool softener prior to and after surgery to help with constipation that may occur due to anesthesia and pain medication.    AFTER SURGERY:    You can shower 24-48 hours after surgery, incision can get wet but do not soak. You  may have bandages and steri strips or you may just have glue over the incision with no bandage.  The glue will peel away after a few days, steri strips you can remove after 1 week.   If you have not had a bowel movement within 3 days after surgery you may take a laxative of your choice.(Take stool softeners as noted above may help)  Do not lift anything over 5-10 pounds.    You need to have a follow up appointment 7-14 days after surgery, call the office to schedule or if you have questions or concerns.

## 2022-09-26 NOTE — PROGRESS NOTES
History & Physical    SUBJECTIVE:     History of Present Illness:  Patient is a 67 y.o. male presents in follow-up after recent inpatient hospitalization.  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  He had an NG-tube placed which decompress the stomach.  Follow-up esophagram showed patency.  The tube was removed after he was asymptomatic and patient was able to tolerate liquids without problems.  He has been tolerating liquids at home without issue.  He re-presented to discuss robotic hiatal hernia repair.    Chief Complaint   Patient presents with    Hernia       Review of patient's allergies indicates:   Allergen Reactions    Penicillins Rash     Other reaction(s): Rash       No current outpatient medications on file.     No current facility-administered medications for this visit.     Facility-Administered Medications Ordered in Other Visits   Medication Dose Route Frequency Provider Last Rate Last Admin    electrolyte-S (ISOLYTE)   Intravenous Continuous Gume Guzman MD 10 mL/hr at 01/28/21 1230 New Bag at 01/28/21 1548    electrolyte-S (ISOLYTE)   Intravenous Continuous Gume Guzman  mL/hr at 01/28/21 1615 Rate Change at 01/28/21 1615       Past Medical History:   Diagnosis Date    Family history of malignant neoplasm of prostate 8/14/2011    Gastroesophageal reflux disease with hiatal hernia     History of prostate cancer 1/7/2016    Hyperlipidemia     Kidney stones     Multinodular goiter     no medications    Prostate cancer 10/20/2014    Prostate disorder     Dr Santos      Past Surgical History:   Procedure Laterality Date    APPENDECTOMY      BLADDER FULGURATION Left 3/13/2019    Procedure: FULGURATION, BLADDER;  Surgeon: Adin Santos MD;  Location: [facility] OR;  Service: Urology;  Laterality: Left;  tumor    COLONOSCOPY  3/26/2008    5-10 year recheck per Dr. Borja (normal exam)    CYSTOSCOPY N/A 7/9/2019    Procedure: CYSTOSCOPY;  Surgeon: Kermit Beltran,  MD;  Location: [facility];  Service: Urology;  Laterality: N/A;    CYSTOSCOPY N/A 10/29/2019    Procedure: CYSTOSCOPY;  Surgeon: Kermit Beltran MD;  Location: [facility] OR;  Service: Urology;  Laterality: N/A;  text pt if he doesnt answer.  Collect urine    CYSTOSCOPY N/A 7/8/2020    Procedure: CYSTOSCOPY;  Surgeon: Kermit Beltran MD;  Location: [facility] OR;  Service: Urology;  Laterality: N/A;    CYSTOSCOPY N/A 1/12/2021    Procedure: CYSTOSCOPY;  Surgeon: Kermit Beltran MD;  Location: [facility] OR;  Service: Urology;  Laterality: N/A;    CYSTOSCOPY W/ URETERAL STENT REMOVAL Left 3/2/2021    Procedure: CYSTOSCOPY, WITH URETERAL STENT REMOVAL;  Surgeon: Kermit Beltran MD;  Location: [facility];  Service: Urology;  Laterality: Left;    CYSTOSCOPY WITH BIOPSY OF BLADDER N/A 11/14/2019    Procedure: CYSTOSCOPY, WITH BLADDER BIOPSY, WITH FULGURATION IF INDICATED;  Surgeon: Kermit Beltran MD;  Location: [facility];  Service: Urology;  Laterality: N/A;    CYSTOSCOPY WITH URETEROSCOPY, RETROGRADE PYELOGRAPHY, AND INSERTION OF STENT Left 1/28/2021    Procedure: CYSTOSCOPY, WITH RETROGRADE PYELOGRAM AND URETERAL STENT INSERTION;  Surgeon: Kermit Beltran MD;  Location: [facility];  Service: Urology;  Laterality: Left;    CYSTOSCOPY WITH URETEROSCOPY, RETROGRADE PYELOGRAPHY, AND INSERTION OF STENT Left 2/11/2021    Procedure: CYSTOSCOPY, WITH RETROGRADE PYELOGRAM AND URETERAL STENT INSERTION/exchange;  Surgeon: Kermit Beltran MD;  Location: [facility];  Service: Urology;  Laterality: Left;    DILATION OF URETHRA N/A 7/25/2019    Procedure: DILATION, URETHRA;  Surgeon: Kermit Beltran MD;  Location: [facility];  Service: Urology;  Laterality: N/A;    DILATION OF URETHRA  1/12/2021    Procedure: DILATION, URETHRA;  Surgeon: Kermit Beltran MD;  Location: [facility] OR;  Service: Urology;;    esphogus scope      FLEXIBLE CYSTOSCOPY N/A 2/13/2019    Procedure: CYSTOSCOPY, FLEXIBLE;  Surgeon: Adin Santos MD;  Location: [facility] OR;   "Service: Urology;  Laterality: N/A;    LASER LITHOTRIPSY Left 2021    Procedure: LITHOTRIPSY, USING LASER;  Surgeon: Kermit Beltran MD;  Location: [facility] OR;  Service: Urology;  Laterality: Left;    leg repair Left     ORCHIECTOMY      spermatocele repair      URETEROSCOPIC REMOVAL OF URETERIC CALCULUS Left 2021    Procedure: REMOVAL, CALCULUS, URETER, URETEROSCOPIC;  Surgeon: Kermit Beltran MD;  Location: [facility] OR;  Service: Urology;  Laterality: Left;     Family History   Problem Relation Age of Onset    Hypertension Mother     Stroke Mother 94    COPD Father     Cancer Father         prostate    Hypertension Sister     Diabetes Sister     Hyperlipidemia Sister     Cancer Maternal Grandmother     Cancer Paternal Grandfather         prostate     Social History     Tobacco Use    Smoking status: Former     Types: Cigarettes     Quit date: 1976     Years since quittin.7    Smokeless tobacco: Never   Substance Use Topics    Alcohol use: Yes     Alcohol/week: 3.0 standard drinks     Types: 3 Glasses of wine per week     Comment: weekly    Drug use: No        Review of Systems:  Review of Systems   Constitutional: Negative.  Negative for fatigue and fever.   HENT: Negative.     Eyes: Negative.    Respiratory: Negative.  Negative for shortness of breath.    Cardiovascular: Negative.  Negative for chest pain.   Gastrointestinal: Negative.    Endocrine: Negative.    Genitourinary: Negative.    Musculoskeletal: Negative.    Skin: Negative.    Allergic/Immunologic: Negative.    Neurological: Negative.    Hematological: Negative.    Psychiatric/Behavioral: Negative.       OBJECTIVE:     Vital Signs (Most Recent)  Temp: 98.1 °F (36.7 °C) (22 1353)  Pulse: 71 (22 1353)  BP: 126/73 (22 1353)  5' 9" (1.753 m)  71.2 kg (156 lb 15.5 oz)     Physical Exam:  Physical Exam  Constitutional:       General: He is not in acute distress.     Appearance: Normal appearance. He is not ill-appearing, " toxic-appearing or diaphoretic.   HENT:      Head: Normocephalic.      Nose: Nose normal.   Eyes:      Conjunctiva/sclera: Conjunctivae normal.   Cardiovascular:      Rate and Rhythm: Normal rate and regular rhythm.   Pulmonary:      Effort: Pulmonary effort is normal.   Abdominal:      Palpations: Abdomen is soft.   Musculoskeletal:         General: Normal range of motion.      Cervical back: Normal range of motion.   Skin:     General: Skin is warm.   Neurological:      General: No focal deficit present.      Mental Status: He is alert.   Psychiatric:         Mood and Affect: Mood normal.       Laboratory  All inpatient labs have been reviewed    Diagnostic Results:  Recent CT scan completed while inpatient and esophagram were reviewed with the patient    ASSESSMENT/PLAN:     67-year-old male with a gastric volvulus related to his hiatal hernia.  He is not obstructed.  The hernia appears to be incarcerated.  I am concerned of the possibility of strangulation given recent symptoms and recent inpatient admission.  We discussed the rationale behind proceeding with robotic hiatal hernia repair with fundoplication even without current symptoms.    PLAN:  Patient did consent to the planned procedure.  He requested that a De Leon be placed at the time of surgery given his issues with urinary retention.

## 2022-09-27 ENCOUNTER — LAB VISIT (OUTPATIENT)
Dept: PRIMARY CARE CLINIC | Facility: CLINIC | Age: 67
End: 2022-09-27
Payer: MEDICARE

## 2022-09-27 ENCOUNTER — PATIENT MESSAGE (OUTPATIENT)
Dept: SURGERY | Facility: HOSPITAL | Age: 67
End: 2022-09-27
Payer: MEDICARE

## 2022-09-27 DIAGNOSIS — K31.89 GASTRIC VOLVULUS: ICD-10-CM

## 2022-09-27 PROCEDURE — U0005 INFEC AGEN DETEC AMPLI PROBE: HCPCS | Performed by: STUDENT IN AN ORGANIZED HEALTH CARE EDUCATION/TRAINING PROGRAM

## 2022-09-27 PROCEDURE — U0003 INFECTIOUS AGENT DETECTION BY NUCLEIC ACID (DNA OR RNA); SEVERE ACUTE RESPIRATORY SYNDROME CORONAVIRUS 2 (SARS-COV-2) (CORONAVIRUS DISEASE [COVID-19]), AMPLIFIED PROBE TECHNIQUE, MAKING USE OF HIGH THROUGHPUT TECHNOLOGIES AS DESCRIBED BY CMS-2020-01-R: HCPCS | Performed by: STUDENT IN AN ORGANIZED HEALTH CARE EDUCATION/TRAINING PROGRAM

## 2022-09-27 RX ORDER — PROMETHAZINE HYDROCHLORIDE 25 MG/1
25 TABLET ORAL EVERY 6 HOURS PRN
Qty: 10 TABLET | Refills: 0 | Status: ON HOLD | OUTPATIENT
Start: 2022-09-27 | End: 2022-11-01 | Stop reason: HOSPADM

## 2022-09-27 RX ORDER — HYDROCODONE BITARTRATE AND ACETAMINOPHEN 7.5; 325 MG/15ML; MG/15ML
15 SOLUTION ORAL EVERY 4 HOURS PRN
Qty: 473 ML | Refills: 0 | Status: ON HOLD | OUTPATIENT
Start: 2022-09-27 | End: 2022-11-01 | Stop reason: HOSPADM

## 2022-09-27 RX ORDER — ONDANSETRON 4 MG/1
4-8 TABLET, ORALLY DISINTEGRATING ORAL EVERY 6 HOURS PRN
Qty: 25 TABLET | Refills: 0 | Status: ON HOLD | OUTPATIENT
Start: 2022-09-27 | End: 2022-11-01 | Stop reason: HOSPADM

## 2022-09-28 ENCOUNTER — PATIENT MESSAGE (OUTPATIENT)
Dept: SURGERY | Facility: HOSPITAL | Age: 67
End: 2022-09-28
Payer: MEDICARE

## 2022-09-28 LAB — SARS-COV-2 RNA RESP QL NAA+PROBE: NOT DETECTED

## 2022-09-28 NOTE — OR NURSING
PAT done via phone. Message to [place], as well. Patient verbalized understanding of all instructions.

## 2022-09-29 ENCOUNTER — ANESTHESIA EVENT (OUTPATIENT)
Dept: SURGERY | Facility: HOSPITAL | Age: 67
DRG: 328 | End: 2022-09-29
Payer: MEDICARE

## 2022-09-29 ENCOUNTER — PATIENT MESSAGE (OUTPATIENT)
Dept: SURGERY | Facility: HOSPITAL | Age: 67
End: 2022-09-29
Payer: MEDICARE

## 2022-09-30 ENCOUNTER — ANESTHESIA (OUTPATIENT)
Dept: SURGERY | Facility: HOSPITAL | Age: 67
DRG: 328 | End: 2022-09-30
Payer: MEDICARE

## 2022-09-30 ENCOUNTER — HOSPITAL ENCOUNTER (INPATIENT)
Facility: HOSPITAL | Age: 67
LOS: 2 days | Discharge: HOME | DRG: 328 | End: 2022-10-02
Attending: INTERNAL MEDICINE | Admitting: STUDENT IN AN ORGANIZED HEALTH CARE EDUCATION/TRAINING PROGRAM
Payer: MEDICARE

## 2022-09-30 DIAGNOSIS — K31.89 GASTRIC VOLVULUS: ICD-10-CM

## 2022-09-30 DIAGNOSIS — R07.9 CHEST PAIN: ICD-10-CM

## 2022-09-30 PROBLEM — Z85.51 HISTORY OF BLADDER CANCER: Chronic | Status: ACTIVE | Noted: 2019-10-29

## 2022-09-30 PROBLEM — Z87.448 HISTORY OF URETHRAL STRICTURE: Chronic | Status: ACTIVE | Noted: 2022-09-30

## 2022-09-30 PROBLEM — K44.9 HIATAL HERNIA: Chronic | Status: ACTIVE | Noted: 2022-09-30

## 2022-09-30 PROCEDURE — 63600175 PHARM REV CODE 636 W HCPCS: Performed by: NURSE ANESTHETIST, CERTIFIED REGISTERED

## 2022-09-30 PROCEDURE — 25000003 PHARM REV CODE 250: Performed by: STUDENT IN AN ORGANIZED HEALTH CARE EDUCATION/TRAINING PROGRAM

## 2022-09-30 PROCEDURE — C1729 CATH, DRAINAGE: HCPCS | Performed by: STUDENT IN AN ORGANIZED HEALTH CARE EDUCATION/TRAINING PROGRAM

## 2022-09-30 PROCEDURE — 63600175 PHARM REV CODE 636 W HCPCS: Performed by: ANESTHESIOLOGY

## 2022-09-30 PROCEDURE — 37000009 HC ANESTHESIA EA ADD 15 MINS: Performed by: STUDENT IN AN ORGANIZED HEALTH CARE EDUCATION/TRAINING PROGRAM

## 2022-09-30 PROCEDURE — 71000033 HC RECOVERY, INTIAL HOUR: Performed by: STUDENT IN AN ORGANIZED HEALTH CARE EDUCATION/TRAINING PROGRAM

## 2022-09-30 PROCEDURE — 94760 N-INVAS EAR/PLS OXIMETRY 1: CPT

## 2022-09-30 PROCEDURE — 43281 PR LAP, REPAIR PARAESOPHAGEAL HERNIA, INCL FUNDOPLASTY W/O MESH: ICD-10-PCS | Mod: ,,, | Performed by: STUDENT IN AN ORGANIZED HEALTH CARE EDUCATION/TRAINING PROGRAM

## 2022-09-30 PROCEDURE — 94799 UNLISTED PULMONARY SVC/PX: CPT

## 2022-09-30 PROCEDURE — 99900103 DSU ONLY-NO CHARGE-INITIAL HR (STAT): Performed by: STUDENT IN AN ORGANIZED HEALTH CARE EDUCATION/TRAINING PROGRAM

## 2022-09-30 PROCEDURE — D9220A PRA ANESTHESIA: ICD-10-PCS | Mod: CRNA,,, | Performed by: NURSE ANESTHETIST, CERTIFIED REGISTERED

## 2022-09-30 PROCEDURE — 36000711: Performed by: STUDENT IN AN ORGANIZED HEALTH CARE EDUCATION/TRAINING PROGRAM

## 2022-09-30 PROCEDURE — 12000002 HC ACUTE/MED SURGE SEMI-PRIVATE ROOM

## 2022-09-30 PROCEDURE — 43281 LAP PARAESOPHAG HERN REPAIR: CPT | Mod: ,,, | Performed by: STUDENT IN AN ORGANIZED HEALTH CARE EDUCATION/TRAINING PROGRAM

## 2022-09-30 PROCEDURE — 51703 INSERT BLADDER CATH COMPLEX: CPT | Mod: ,,, | Performed by: UROLOGY

## 2022-09-30 PROCEDURE — D9220A PRA ANESTHESIA: Mod: CRNA,,, | Performed by: NURSE ANESTHETIST, CERTIFIED REGISTERED

## 2022-09-30 PROCEDURE — 25000003 PHARM REV CODE 250: Performed by: ANESTHESIOLOGY

## 2022-09-30 PROCEDURE — 51703 PR INSERTION OF TEMPORARY INDWELLING BLADDER CATHETERIZATION, COMPLICA: ICD-10-PCS | Mod: ,,, | Performed by: UROLOGY

## 2022-09-30 PROCEDURE — 71000039 HC RECOVERY, EACH ADD'L HOUR: Performed by: STUDENT IN AN ORGANIZED HEALTH CARE EDUCATION/TRAINING PROGRAM

## 2022-09-30 PROCEDURE — C1758 CATHETER, URETERAL: HCPCS | Performed by: STUDENT IN AN ORGANIZED HEALTH CARE EDUCATION/TRAINING PROGRAM

## 2022-09-30 PROCEDURE — D9220A PRA ANESTHESIA: Mod: ANES,,, | Performed by: ANESTHESIOLOGY

## 2022-09-30 PROCEDURE — 37000008 HC ANESTHESIA 1ST 15 MINUTES: Performed by: STUDENT IN AN ORGANIZED HEALTH CARE EDUCATION/TRAINING PROGRAM

## 2022-09-30 PROCEDURE — 27201423 OPTIME MED/SURG SUP & DEVICES STERILE SUPPLY: Performed by: STUDENT IN AN ORGANIZED HEALTH CARE EDUCATION/TRAINING PROGRAM

## 2022-09-30 PROCEDURE — 36000710: Performed by: STUDENT IN AN ORGANIZED HEALTH CARE EDUCATION/TRAINING PROGRAM

## 2022-09-30 PROCEDURE — 25000003 PHARM REV CODE 250: Performed by: NURSE ANESTHETIST, CERTIFIED REGISTERED

## 2022-09-30 PROCEDURE — 99900104 DSU ONLY-NO CHARGE-EA ADD'L HR (STAT): Performed by: STUDENT IN AN ORGANIZED HEALTH CARE EDUCATION/TRAINING PROGRAM

## 2022-09-30 PROCEDURE — D9220A PRA ANESTHESIA: ICD-10-PCS | Mod: ANES,,, | Performed by: ANESTHESIOLOGY

## 2022-09-30 PROCEDURE — C1769 GUIDE WIRE: HCPCS | Performed by: STUDENT IN AN ORGANIZED HEALTH CARE EDUCATION/TRAINING PROGRAM

## 2022-09-30 RX ORDER — KETOROLAC TROMETHAMINE 30 MG/ML
INJECTION, SOLUTION INTRAMUSCULAR; INTRAVENOUS
Status: DISCONTINUED | OUTPATIENT
Start: 2022-09-30 | End: 2022-09-30

## 2022-09-30 RX ORDER — SODIUM,POTASSIUM PHOSPHATES 280-250MG
2 POWDER IN PACKET (EA) ORAL
Status: DISCONTINUED | OUTPATIENT
Start: 2022-09-30 | End: 2022-10-02 | Stop reason: HOSPADM

## 2022-09-30 RX ORDER — HYDROCODONE BITARTRATE AND ACETAMINOPHEN 7.5; 325 MG/15ML; MG/15ML
15 SOLUTION ORAL EVERY 4 HOURS PRN
Status: DISCONTINUED | OUTPATIENT
Start: 2022-09-30 | End: 2022-10-02 | Stop reason: HOSPADM

## 2022-09-30 RX ORDER — SUCCINYLCHOLINE CHLORIDE 20 MG/ML
INJECTION INTRAMUSCULAR; INTRAVENOUS
Status: DISCONTINUED | OUTPATIENT
Start: 2022-09-30 | End: 2022-09-30

## 2022-09-30 RX ORDER — FENTANYL CITRATE 50 UG/ML
INJECTION, SOLUTION INTRAMUSCULAR; INTRAVENOUS
Status: DISCONTINUED | OUTPATIENT
Start: 2022-09-30 | End: 2022-09-30

## 2022-09-30 RX ORDER — LIDOCAINE HYDROCHLORIDE 10 MG/ML
1 INJECTION, SOLUTION EPIDURAL; INFILTRATION; INTRACAUDAL; PERINEURAL ONCE
Status: COMPLETED | OUTPATIENT
Start: 2022-09-30 | End: 2022-09-30

## 2022-09-30 RX ORDER — CLINDAMYCIN PHOSPHATE 900 MG/50ML
900 INJECTION, SOLUTION INTRAVENOUS
Status: COMPLETED | OUTPATIENT
Start: 2022-09-30 | End: 2022-09-30

## 2022-09-30 RX ORDER — DEXAMETHASONE SODIUM PHOSPHATE 4 MG/ML
INJECTION, SOLUTION INTRA-ARTICULAR; INTRALESIONAL; INTRAMUSCULAR; INTRAVENOUS; SOFT TISSUE
Status: DISCONTINUED | OUTPATIENT
Start: 2022-09-30 | End: 2022-09-30

## 2022-09-30 RX ORDER — SODIUM CHLORIDE, SODIUM LACTATE, POTASSIUM CHLORIDE, CALCIUM CHLORIDE 600; 310; 30; 20 MG/100ML; MG/100ML; MG/100ML; MG/100ML
INJECTION, SOLUTION INTRAVENOUS CONTINUOUS
Status: DISCONTINUED | OUTPATIENT
Start: 2022-09-30 | End: 2022-09-30

## 2022-09-30 RX ORDER — ONDANSETRON HYDROCHLORIDE 2 MG/ML
INJECTION, SOLUTION INTRAMUSCULAR; INTRAVENOUS
Status: DISCONTINUED | OUTPATIENT
Start: 2022-09-30 | End: 2022-09-30

## 2022-09-30 RX ORDER — MUPIROCIN 20 MG/G
OINTMENT TOPICAL 2 TIMES DAILY
Status: DISCONTINUED | OUTPATIENT
Start: 2022-09-30 | End: 2022-10-02 | Stop reason: HOSPADM

## 2022-09-30 RX ORDER — IBUPROFEN 200 MG
24 TABLET ORAL
Status: DISCONTINUED | OUTPATIENT
Start: 2022-09-30 | End: 2022-10-02 | Stop reason: HOSPADM

## 2022-09-30 RX ORDER — HYDROMORPHONE HYDROCHLORIDE 2 MG/ML
0.2 INJECTION, SOLUTION INTRAMUSCULAR; INTRAVENOUS; SUBCUTANEOUS EVERY 5 MIN PRN
Status: DISCONTINUED | OUTPATIENT
Start: 2022-09-30 | End: 2022-10-01

## 2022-09-30 RX ORDER — BUPIVACAINE HYDROCHLORIDE AND EPINEPHRINE 2.5; 5 MG/ML; UG/ML
INJECTION, SOLUTION EPIDURAL; INFILTRATION; INTRACAUDAL; PERINEURAL
Status: DISCONTINUED | OUTPATIENT
Start: 2022-09-30 | End: 2022-09-30 | Stop reason: HOSPADM

## 2022-09-30 RX ORDER — PROCHLORPERAZINE EDISYLATE 5 MG/ML
5 INJECTION INTRAMUSCULAR; INTRAVENOUS EVERY 30 MIN PRN
Status: COMPLETED | OUTPATIENT
Start: 2022-09-30 | End: 2022-09-30

## 2022-09-30 RX ORDER — TAMSULOSIN HYDROCHLORIDE 0.4 MG/1
0.4 CAPSULE ORAL DAILY
Status: DISCONTINUED | OUTPATIENT
Start: 2022-10-01 | End: 2022-10-02 | Stop reason: HOSPADM

## 2022-09-30 RX ORDER — SODIUM CHLORIDE 9 MG/ML
INJECTION, SOLUTION INTRAVENOUS CONTINUOUS
Status: DISCONTINUED | OUTPATIENT
Start: 2022-09-30 | End: 2022-09-30

## 2022-09-30 RX ORDER — ACETAMINOPHEN 325 MG/1
650 TABLET ORAL EVERY 6 HOURS PRN
Status: DISCONTINUED | OUTPATIENT
Start: 2022-09-30 | End: 2022-10-02 | Stop reason: HOSPADM

## 2022-09-30 RX ORDER — OXYCODONE HYDROCHLORIDE 5 MG/1
5 TABLET ORAL
Status: DISCONTINUED | OUTPATIENT
Start: 2022-09-30 | End: 2022-10-01

## 2022-09-30 RX ORDER — ROCURONIUM BROMIDE 10 MG/ML
INJECTION, SOLUTION INTRAVENOUS
Status: DISCONTINUED | OUTPATIENT
Start: 2022-09-30 | End: 2022-09-30

## 2022-09-30 RX ORDER — MAG HYDROX/ALUMINUM HYD/SIMETH 200-200-20
30 SUSPENSION, ORAL (FINAL DOSE FORM) ORAL 4 TIMES DAILY PRN
Status: DISCONTINUED | OUTPATIENT
Start: 2022-09-30 | End: 2022-10-02 | Stop reason: HOSPADM

## 2022-09-30 RX ORDER — LANOLIN ALCOHOL/MO/W.PET/CERES
800 CREAM (GRAM) TOPICAL
Status: DISCONTINUED | OUTPATIENT
Start: 2022-09-30 | End: 2022-10-02 | Stop reason: HOSPADM

## 2022-09-30 RX ORDER — LIDOCAINE HCL/PF 100 MG/5ML
SYRINGE (ML) INTRAVENOUS
Status: DISCONTINUED | OUTPATIENT
Start: 2022-09-30 | End: 2022-09-30

## 2022-09-30 RX ORDER — MEPERIDINE HYDROCHLORIDE 50 MG/ML
12.5 INJECTION INTRAMUSCULAR; INTRAVENOUS; SUBCUTANEOUS ONCE
Status: COMPLETED | OUTPATIENT
Start: 2022-09-30 | End: 2022-09-30

## 2022-09-30 RX ORDER — ACETAMINOPHEN 10 MG/ML
INJECTION, SOLUTION INTRAVENOUS
Status: DISCONTINUED | OUTPATIENT
Start: 2022-09-30 | End: 2022-09-30

## 2022-09-30 RX ORDER — ONDANSETRON 2 MG/ML
8 INJECTION INTRAMUSCULAR; INTRAVENOUS EVERY 6 HOURS PRN
Status: DISCONTINUED | OUTPATIENT
Start: 2022-09-30 | End: 2022-10-02 | Stop reason: HOSPADM

## 2022-09-30 RX ORDER — HYDROCODONE BITARTRATE AND ACETAMINOPHEN 7.5; 325 MG/15ML; MG/15ML
30 SOLUTION ORAL EVERY 4 HOURS PRN
Status: DISCONTINUED | OUTPATIENT
Start: 2022-09-30 | End: 2022-10-02 | Stop reason: HOSPADM

## 2022-09-30 RX ORDER — GLUCAGON 1 MG
1 KIT INJECTION
Status: DISCONTINUED | OUTPATIENT
Start: 2022-09-30 | End: 2022-10-02 | Stop reason: HOSPADM

## 2022-09-30 RX ORDER — SODIUM CHLORIDE 0.9 % (FLUSH) 0.9 %
10 SYRINGE (ML) INJECTION EVERY 12 HOURS PRN
Status: DISCONTINUED | OUTPATIENT
Start: 2022-09-30 | End: 2022-10-02 | Stop reason: HOSPADM

## 2022-09-30 RX ORDER — PROPOFOL 10 MG/ML
VIAL (ML) INTRAVENOUS
Status: DISCONTINUED | OUTPATIENT
Start: 2022-09-30 | End: 2022-09-30

## 2022-09-30 RX ORDER — PHENYLEPHRINE HYDROCHLORIDE 10 MG/ML
INJECTION INTRAVENOUS
Status: DISCONTINUED | OUTPATIENT
Start: 2022-09-30 | End: 2022-09-30

## 2022-09-30 RX ORDER — NALOXONE HCL 0.4 MG/ML
0.02 VIAL (ML) INJECTION
Status: DISCONTINUED | OUTPATIENT
Start: 2022-09-30 | End: 2022-10-02 | Stop reason: HOSPADM

## 2022-09-30 RX ORDER — ONDANSETRON 8 MG/1
8 TABLET, ORALLY DISINTEGRATING ORAL EVERY 8 HOURS PRN
Status: DISCONTINUED | OUTPATIENT
Start: 2022-09-30 | End: 2022-09-30 | Stop reason: SDUPTHER

## 2022-09-30 RX ORDER — FENTANYL CITRATE 50 UG/ML
25 INJECTION, SOLUTION INTRAMUSCULAR; INTRAVENOUS EVERY 5 MIN PRN
Status: DISCONTINUED | OUTPATIENT
Start: 2022-09-30 | End: 2022-10-01

## 2022-09-30 RX ORDER — VASOPRESSIN 20 [USP'U]/ML
INJECTION, SOLUTION INTRAMUSCULAR; SUBCUTANEOUS
Status: DISCONTINUED | OUTPATIENT
Start: 2022-09-30 | End: 2022-09-30

## 2022-09-30 RX ORDER — TALC
6 POWDER (GRAM) TOPICAL NIGHTLY PRN
Status: DISCONTINUED | OUTPATIENT
Start: 2022-09-30 | End: 2022-10-02 | Stop reason: HOSPADM

## 2022-09-30 RX ORDER — IBUPROFEN 200 MG
16 TABLET ORAL
Status: DISCONTINUED | OUTPATIENT
Start: 2022-09-30 | End: 2022-10-02 | Stop reason: HOSPADM

## 2022-09-30 RX ADMIN — ROCURONIUM BROMIDE 10 MG: 10 INJECTION, SOLUTION INTRAVENOUS at 04:09

## 2022-09-30 RX ADMIN — FENTANYL CITRATE 25 MCG: 50 INJECTION, SOLUTION INTRAMUSCULAR; INTRAVENOUS at 05:09

## 2022-09-30 RX ADMIN — FENTANYL CITRATE 50 MCG: 50 INJECTION, SOLUTION INTRAMUSCULAR; INTRAVENOUS at 02:09

## 2022-09-30 RX ADMIN — ONDANSETRON 4 MG: 2 INJECTION, SOLUTION INTRAMUSCULAR; INTRAVENOUS at 04:09

## 2022-09-30 RX ADMIN — FENTANYL CITRATE 50 MCG: 50 INJECTION, SOLUTION INTRAMUSCULAR; INTRAVENOUS at 05:09

## 2022-09-30 RX ADMIN — PHENYLEPHRINE HYDROCHLORIDE 100 MCG: 10 INJECTION INTRAVENOUS at 02:09

## 2022-09-30 RX ADMIN — ONDANSETRON 4 MG: 2 INJECTION, SOLUTION INTRAMUSCULAR; INTRAVENOUS at 02:09

## 2022-09-30 RX ADMIN — LIDOCAINE HYDROCHLORIDE 75 MG: 20 INJECTION INTRAVENOUS at 02:09

## 2022-09-30 RX ADMIN — PROCHLORPERAZINE EDISYLATE 5 MG: 5 INJECTION INTRAMUSCULAR; INTRAVENOUS at 05:09

## 2022-09-30 RX ADMIN — GLYCOPYRROLATE 0.1 MG: 0.2 INJECTION, SOLUTION INTRAMUSCULAR; INTRAVITREAL at 02:09

## 2022-09-30 RX ADMIN — MEPERIDINE HYDROCHLORIDE 12.5 MG: 50 INJECTION INTRAMUSCULAR; INTRAVENOUS; SUBCUTANEOUS at 06:09

## 2022-09-30 RX ADMIN — LIDOCAINE HYDROCHLORIDE 10 MG: 10 INJECTION, SOLUTION EPIDURAL; INFILTRATION; INTRACAUDAL; PERINEURAL at 11:09

## 2022-09-30 RX ADMIN — PHENYLEPHRINE HYDROCHLORIDE 100 MCG: 10 INJECTION INTRAVENOUS at 03:09

## 2022-09-30 RX ADMIN — PHENYLEPHRINE HYDROCHLORIDE 20 MCG/MIN: 10 INJECTION INTRAVENOUS at 02:09

## 2022-09-30 RX ADMIN — PROCHLORPERAZINE EDISYLATE 5 MG: 5 INJECTION INTRAMUSCULAR; INTRAVENOUS at 06:09

## 2022-09-30 RX ADMIN — SODIUM CHLORIDE, SODIUM GLUCONATE, SODIUM ACETATE, POTASSIUM CHLORIDE, MAGNESIUM CHLORIDE, SODIUM PHOSPHATE, DIBASIC, AND POTASSIUM PHOSPHATE: .53; .5; .37; .037; .03; .012; .00082 INJECTION, SOLUTION INTRAVENOUS at 02:09

## 2022-09-30 RX ADMIN — DEXAMETHASONE SODIUM PHOSPHATE 8 MG: 4 INJECTION, SOLUTION INTRA-ARTICULAR; INTRALESIONAL; INTRAMUSCULAR; INTRAVENOUS; SOFT TISSUE at 02:09

## 2022-09-30 RX ADMIN — CLINDAMYCIN PHOSPHATE 900 MG: 18 INJECTION, SOLUTION INTRAVENOUS at 02:09

## 2022-09-30 RX ADMIN — VASOPRESSIN 1 UNITS: 20 INJECTION, SOLUTION INTRAMUSCULAR; SUBCUTANEOUS at 03:09

## 2022-09-30 RX ADMIN — ROCURONIUM BROMIDE 10 MG: 10 INJECTION, SOLUTION INTRAVENOUS at 03:09

## 2022-09-30 RX ADMIN — SUCCINYLCHOLINE CHLORIDE 120 MG: 20 INJECTION, SOLUTION INTRAMUSCULAR; INTRAVENOUS; PARENTERAL at 02:09

## 2022-09-30 RX ADMIN — ROCURONIUM BROMIDE 40 MG: 10 INJECTION, SOLUTION INTRAVENOUS at 02:09

## 2022-09-30 RX ADMIN — SUGAMMADEX 100 MG: 100 INJECTION, SOLUTION INTRAVENOUS at 05:09

## 2022-09-30 RX ADMIN — KETOROLAC TROMETHAMINE 15 MG: 30 INJECTION, SOLUTION INTRAMUSCULAR; INTRAVENOUS at 04:09

## 2022-09-30 RX ADMIN — MUPIROCIN: 20 OINTMENT TOPICAL at 09:09

## 2022-09-30 RX ADMIN — OXYCODONE 5 MG: 5 TABLET ORAL at 05:09

## 2022-09-30 RX ADMIN — PROPOFOL 150 MG: 10 INJECTION, EMULSION INTRAVENOUS at 02:09

## 2022-09-30 RX ADMIN — ACETAMINOPHEN 1000 MG: 10 INJECTION, SOLUTION INTRAVENOUS at 02:09

## 2022-09-30 RX ADMIN — ROCURONIUM BROMIDE 10 MG: 10 INJECTION, SOLUTION INTRAVENOUS at 02:09

## 2022-09-30 NOTE — SUBJECTIVE & OBJECTIVE
Past Medical History:   Diagnosis Date    Family history of malignant neoplasm of prostate 8/14/2011    Gastroesophageal reflux disease with hiatal hernia     History of prostate cancer 1/7/2016    Hyperlipidemia     Kidney stones     Multinodular goiter     no medications    Prostate cancer 10/20/2014    Prostate disorder     Dr Santos        Past Surgical History:   Procedure Laterality Date    APPENDECTOMY      BLADDER FULGURATION Left 3/13/2019    Procedure: FULGURATION, BLADDER;  Surgeon: Adin Santos MD;  Location: [facility];  Service: Urology;  Laterality: Left;  tumor    COLONOSCOPY  3/26/2008    5-10 year recheck per Dr. Borja (normal exam)    CYSTOSCOPY N/A 7/9/2019    Procedure: CYSTOSCOPY;  Surgeon: Kermit Beltran MD;  Location: [facility];  Service: Urology;  Laterality: N/A;    CYSTOSCOPY N/A 10/29/2019    Procedure: CYSTOSCOPY;  Surgeon: Kermit Beltran MD;  Location: [facility];  Service: Urology;  Laterality: N/A;  text pt if he doesnt answer.  Collect urine    CYSTOSCOPY N/A 7/8/2020    Procedure: CYSTOSCOPY;  Surgeon: Kermit Beltran MD;  Location: [facility];  Service: Urology;  Laterality: N/A;    CYSTOSCOPY N/A 1/12/2021    Procedure: CYSTOSCOPY;  Surgeon: Kermit Beltran MD;  Location: [facility];  Service: Urology;  Laterality: N/A;    CYSTOSCOPY W/ URETERAL STENT REMOVAL Left 3/2/2021    Procedure: CYSTOSCOPY, WITH URETERAL STENT REMOVAL;  Surgeon: Kermit Beltran MD;  Location: [facility];  Service: Urology;  Laterality: Left;    CYSTOSCOPY WITH BIOPSY OF BLADDER N/A 11/14/2019    Procedure: CYSTOSCOPY, WITH BLADDER BIOPSY, WITH FULGURATION IF INDICATED;  Surgeon: Kermit Beltran MD;  Location: [facility] OR;  Service: Urology;  Laterality: N/A;    CYSTOSCOPY WITH URETEROSCOPY, RETROGRADE PYELOGRAPHY, AND INSERTION OF STENT Left 1/28/2021    Procedure: CYSTOSCOPY, WITH RETROGRADE PYELOGRAM AND URETERAL STENT INSERTION;  Surgeon: Kermit Beltran MD;  Location: [facility];  Service:  Urology;  Laterality: Left;    CYSTOSCOPY WITH URETEROSCOPY, RETROGRADE PYELOGRAPHY, AND INSERTION OF STENT Left 2/11/2021    Procedure: CYSTOSCOPY, WITH RETROGRADE PYELOGRAM AND URETERAL STENT INSERTION/exchange;  Surgeon: Kermit Beltran MD;  Location: [facility] OR;  Service: Urology;  Laterality: Left;    DILATION OF URETHRA N/A 7/25/2019    Procedure: DILATION, URETHRA;  Surgeon: Kermit Beltran MD;  Location: [facility] OR;  Service: Urology;  Laterality: N/A;    DILATION OF URETHRA  1/12/2021    Procedure: DILATION, URETHRA;  Surgeon: Kermit Beltran MD;  Location: [facility] OR;  Service: Urology;;    esphogus scope      FLEXIBLE CYSTOSCOPY N/A 2/13/2019    Procedure: CYSTOSCOPY, FLEXIBLE;  Surgeon: Adin Santos MD;  Location: [facility] OR;  Service: Urology;  Laterality: N/A;    LASER LITHOTRIPSY Left 2/11/2021    Procedure: LITHOTRIPSY, USING LASER;  Surgeon: Kermit Beltran MD;  Location: [facility] OR;  Service: Urology;  Laterality: Left;    leg repair Left     ORCHIECTOMY      spermatocele repair      URETEROSCOPIC REMOVAL OF URETERIC CALCULUS Left 2/11/2021    Procedure: REMOVAL, CALCULUS, URETER, URETEROSCOPIC;  Surgeon: Kermit Beltran MD;  Location: [facility] OR;  Service: Urology;  Laterality: Left;       Review of patient's allergies indicates:   Allergen Reactions    Penicillins Rash     Other reaction(s): Rash       Current Facility-Administered Medications on File Prior to Encounter   Medication    electrolyte-S (ISOLYTE)    electrolyte-S (ISOLYTE)     Current Outpatient Medications on File Prior to Encounter   Medication Sig    tamsulosin HCl (FLOMAX ORAL) Take by mouth.    hydrocodone-acetaminophen (HYCET) solution 7.5-325 mg/15mL Take 15 mLs by mouth every 4 (four) hours as needed for Pain.    ondansetron (ZOFRAN-ODT) 4 MG TbDL Take 1-2 tablets (4-8 mg total) by mouth every 6 (six) hours as needed.    promethazine (PHENERGAN) 25 MG tablet Take 1 tablet (25 mg total) by mouth every 6 (six) hours as  needed for Nausea (2nd line).     Family History       Problem Relation (Age of Onset)    COPD Father    Cancer Father, Maternal Grandmother, Paternal Grandfather    Diabetes Sister    Hyperlipidemia Sister    Hypertension Mother, Sister    Stroke Mother (94)          Tobacco Use    Smoking status: Former     Types: Cigarettes     Quit date: 1976     Years since quittin.7    Smokeless tobacco: Never   Substance and Sexual Activity    Alcohol use: Yes     Alcohol/week: 3.0 standard drinks     Types: 3 Glasses of wine per week     Comment: weekly    Drug use: No    Sexual activity: Yes     Partners: Female     Review of Systems   Constitutional:  Negative for appetite change, chills, fatigue and fever.   HENT:  Negative for congestion and rhinorrhea.    Eyes:  Negative for visual disturbance.   Respiratory:  Negative for cough and shortness of breath.    Cardiovascular:  Negative for chest pain and leg swelling.   Gastrointestinal:  Negative for abdominal pain, constipation, diarrhea, nausea and vomiting.   Genitourinary:  Positive for difficulty urinating (chronic). Negative for dysuria.   Musculoskeletal:  Negative for arthralgias and myalgias.   Skin:  Positive for wound (post-op). Negative for rash.   Neurological:  Negative for dizziness, weakness, numbness and headaches.   Psychiatric/Behavioral:  Negative for confusion.    All other systems reviewed and are negative.  Objective:     Vital Signs (Most Recent):  Temp: 97.7 °F (36.5 °C) (22 1715)  Pulse: 90 (22 1755)  Resp: 14 (22 1755)  BP: 123/64 (22 1755)  SpO2: 95 % (22 1755) Vital Signs (24h Range):  Temp:  [97.7 °F (36.5 °C)] 97.7 °F (36.5 °C)  Pulse:  [65-98] 90  Resp:  [14-32] 14  SpO2:  [91 %-98 %] 95 %  BP: (123-164)/(58-84) 123/64     Weight: 72.6 kg (160 lb)  Body mass index is 23.63 kg/m².    Physical Exam  Vitals reviewed.   Constitutional:       General: He is not in acute distress.     Appearance: Normal  appearance. He is not ill-appearing.   HENT:      Head: Normocephalic and atraumatic.      Right Ear: External ear normal.      Left Ear: External ear normal.      Nose: Nose normal.      Mouth/Throat:      Mouth: Mucous membranes are moist.      Pharynx: Oropharynx is clear.   Eyes:      General:         Right eye: No discharge.         Left eye: No discharge.      Conjunctiva/sclera: Conjunctivae normal.   Cardiovascular:      Rate and Rhythm: Normal rate and regular rhythm.      Pulses: Normal pulses.      Heart sounds: Normal heart sounds. No murmur heard.  Pulmonary:      Effort: Pulmonary effort is normal. No respiratory distress.      Breath sounds: Normal breath sounds. No wheezing, rhonchi or rales.   Abdominal:      General: Abdomen is flat. Bowel sounds are normal. There is no distension.      Palpations: Abdomen is soft.      Tenderness: There is no abdominal tenderness. There is no guarding or rebound.      Comments: Lap sites without concern   Genitourinary:     Comments: De Leon in place draining straw-colored urine  Musculoskeletal:         General: No swelling, deformity or signs of injury.      Cervical back: Neck supple. No rigidity.      Right lower leg: No edema.      Left lower leg: No edema.   Skin:     General: Skin is warm and dry.      Findings: No rash.   Neurological:      General: No focal deficit present.      Mental Status: He is alert and oriented to person, place, and time.   Psychiatric:         Mood and Affect: Affect normal.         Behavior: Behavior normal.         Judgment: Judgment normal.           Significant Labs: All pertinent labs within the past 24 hours have been reviewed.    Significant Imaging: I have reviewed all pertinent imaging results/findings within the past 24 hours.

## 2022-09-30 NOTE — HPI
67M with PMH bladder cancer s/p resection, prostate cancer s/p XRT, urethral stricture, and kidney stones is admitted to hospital medicine for continued monitoring after scheduled hiatal hernia repair with toupet fundoplication performed by Dr. Mcmahan 9/30/22. Did require urology assistance for gr placement intra-op. Otherwise procedure went well. Patient without pain or other complaint at this time. He is seen in PACU.

## 2022-09-30 NOTE — ASSESSMENT & PLAN NOTE
Multifactorial  Gr placed by urology intra-op  Urology recommend keeping gr for 1wk and f/u in office for removal  Gr care  Continue flomax

## 2022-09-30 NOTE — ASSESSMENT & PLAN NOTE
S/p hiatal hernia repair with toupet fundoplication with Dr. Mcmahan 9/30/22  Follow gen surg recs for post-op management  PT, IS, pain control

## 2022-09-30 NOTE — ANESTHESIA PROCEDURE NOTES
Intubation    Date/Time: 9/30/2022 2:11 PM  Performed by: Edwin Haley CRNA  Authorized by: Boo Harris MD     Intubation:     Attempted By:  JORGE    Blade:  Baugh 3    Laryngeal View Grade: Grade I - full view of cords      Difficult Airway Encountered?: No      Complications:  None    Airway Device:  Oral endotracheal tube    Airway Device Size:  7.5    Style/Cuff Inflation:  Cuffed    Inflation Amount (mL):  4    Tube secured:  22    Placement Verified By:  Capnometry    Complicating Factors:  None    Findings Post-Intubation:  BS equal bilateral

## 2022-09-30 NOTE — HOSPITAL COURSE
Admitted for monitoring after robotic hiatal hernia repair with toupet fundoplication with Dr. Mcmahan 9/30/22. De Leon kept in place per urology recommendations for difficult placement and history of urethral stricture. Plan to remove next week in clinic. Post-operatively, no N/V. + BM. Tolerating meals. Vitals stable. Ambulated with PT without difficulty. Patient has all home meds and good support at home. Ok to dc home today

## 2022-09-30 NOTE — ANESTHESIA PREPROCEDURE EVALUATION
09/30/2022  Edwin Sepulveda III is a 67 y.o., male.      Pre-op Assessment    I have reviewed the Patient Summary Reports.     I have reviewed the Nursing Notes. I have reviewed the NPO Status.   I have reviewed the Medications.     Review of Systems  Anesthesia Hx:  No problems with previous Anesthesia    Social:  Former Smoker, Social Alcohol Use    Hematology/Oncology:         -- Cancer in past history:   Cardiovascular:   hyperlipidemia    Renal/:   Chronic Renal Disease    Hepatic/GI:   GERD Gastric volvulus    Neurological:   Neuromuscular Disease,        Physical Exam  General: Well nourished, Cooperative, Alert and Oriented    Airway:  Mallampati: II   Mouth Opening: Normal  TM Distance: 4 - 6 cm  Tongue: Normal  Neck ROM: Normal ROM    Dental:  Intact        Anesthesia Plan  Type of Anesthesia, risks & benefits discussed:    Anesthesia Type: Gen ETT  Intra-op Monitoring Plan: Standard ASA Monitors  Post Op Pain Control Plan: multimodal analgesia and IV/PO Opioids PRN  Induction:  IV  Airway Plan: Direct and Video, Post-Induction  Informed Consent: Informed consent signed with the Patient and all parties understand the risks and agree with anesthesia plan.  All questions answered.   ASA Score: 3  Day of Surgery Review of History & Physical: H&P Update referred to the surgeon/provider.    Ready For Surgery From Anesthesia Perspective.     .

## 2022-09-30 NOTE — OP NOTE
Intraoperative complex Gr catheter placement ([id number]):    Date: 09/30/2022    Staff Surgeon: Kermit Beltran MD    Pre-Op Diagnosis: history of urethral stricture, unable to place gr    Post-Op Diagnosis: same    Procedure(s) Performed:   Complex Gr catheter placement over guidewire    Specimen(s): none    Anesthesia: General endotracheal anesthesia    Findings: urethral stricture recurrence most likely, passively dilated with gr over wire as 16 fr routine gr and 14 fr silicone unable to pass    Estimated Blood Loss: none    Drains: 16 [place] tip gr    Complications: none    Indications for procedure:  67-year-old male patient known to me with history of radiation for prostate cancer, intravesical treatment for bladder cancer, and history of urethral stricture which has needed dilation in the past.  He presents for elective laparoscopic/robotic hiatal hernia with Dr. Mcmahan, and Gr catheter was unable to be placed at the beginning of the procedure.  At last cystoscopic evaluation for management of stones in February of 2021, I was able to pass a 21 Israeli cystoscope with out resistance past the mild stable narrowing of previous stricture site.  He has cancelled his regularly scheduled follow-up this year after virtual visit in February of 2022 to noted that he was doing very well and preferred annual visit.    Procedure in detail:  Patient was under general anesthesia in the operating room and this was an intraoperative consult.   OR nursing and MD attempted 16 Israeli routine Gr catheter placement as well as 14 Israeli silicone Gr catheter placement with intraurethral lubricating jelly and urethra on stretch.  Unsuccessful.  I was called to the operating room and did attempt to pass the silicone Gr catheter myself noting resistance and unable to pass the bulbar urethra despite the above.    I then passed a hydrophilic Glidewire per the urethra without resistance until it was felt that  it was within the bladder.  A 5 Sen[place]alese open-ended ureteral catheter was passed over the guidewire into the bladder and the hydrophilic guidewire removed.  A superstiff guidewire was then passed per the open-ended catheter into the bladder at which time the open-ended catheter was off-loaded.  Sixteen Sen[place]e [place] tip De Leon catheter was placed over the wire to see if it would pass over the guidewire or if urethra needed dilation.  The 16 Sen[place]e [place] tip catheter was able to pass over the superstiff guidewire noting some mild resistance at the bulbar urethral, but felt it breakthrough what was likely recurrence of the stricture mildly, passively dilating it, and able to pass with minimal resistance beyond this into the bladder.  10 cc placed in the balloon, and the slack was taken out of the De Leon catheter after which it was seen to be draining clear yellow urine.  De Leon catheter was placed to gravity drainage and Dr. Mcmahan assumed care for his planned elective procedure.    Disposition:  I advised that his De Leon catheter should remain indwelling for approximately 1 week and I would set up outpatient follow-up.  Patient should be discharged home with De Leon catheter in place after having leg bag and night bag teaching on the floor prior to discharge home as planned tomorrow.  I have set a nurse visit in our clinic for his De Leon catheter removal next Thursday 10/6/22, and then set follow-up within 1 week later in my office for routine follow-up that he initially deferred to revisit on stricture, history of malignancies, kidney stones etcetera.  This has been booked in my office on Wednesday 10/12/22.

## 2022-09-30 NOTE — H&P
[facility] Medicine  History & Physical    Patient Name: Edwin Sepulveda III  MRN: [id number]  Patient Class: IP- Surgery Admit  Admission Date: 9/30/2022  Attending Physician: Sen Brown MD  Primary Care Provider: Francis Teague MD         Patient information was obtained from patient and past medical records.     Subjective:     Principal Problem:Gastric volvulus    Chief Complaint:   Chief Complaint   Patient presents with    Procedure        HPI: 67M with PMH bladder cancer s/p resection, prostate cancer s/p XRT, urethral stricture, and kidney stones is admitted to hospital medicine for continued monitoring after scheduled hiatal hernia repair with toupet fundoplication performed by Dr. Mcmahan 9/30/22. Did require urology assistance for gr placement intra-op. Otherwise procedure went well. Patient without pain or other complaint at this time. He is seen in PACU.      Past Medical History:   Diagnosis Date    Family history of malignant neoplasm of prostate 8/14/2011    Gastroesophageal reflux disease with hiatal hernia     History of prostate cancer 1/7/2016    Hyperlipidemia     Kidney stones     Multinodular goiter     no medications    Prostate cancer 10/20/2014    Prostate disorder     Dr Santos        Past Surgical History:   Procedure Laterality Date    APPENDECTOMY      BLADDER FULGURATION Left 3/13/2019    Procedure: FULGURATION, BLADDER;  Surgeon: Adin Santos MD;  Location: [facility] OR;  Service: Urology;  Laterality: Left;  tumor    COLONOSCOPY  3/26/2008    5-10 year recheck per Dr. Borja (normal exam)    CYSTOSCOPY N/A 7/9/2019    Procedure: CYSTOSCOPY;  Surgeon: Kermit Beltran MD;  Location: [facility] OR;  Service: Urology;  Laterality: N/A;    CYSTOSCOPY N/A 10/29/2019    Procedure: CYSTOSCOPY;  Surgeon: Kermit Beltran MD;  Location: [facility] OR;  Service: Urology;  Laterality: N/A;  text pt if he doesnt answer.  Collect urine    CYSTOSCOPY N/A  7/8/2020    Procedure: CYSTOSCOPY;  Surgeon: Kermit Beltran MD;  Location: [facility] OR;  Service: Urology;  Laterality: N/A;    CYSTOSCOPY N/A 1/12/2021    Procedure: CYSTOSCOPY;  Surgeon: Kermit Beltran MD;  Location: [facility] OR;  Service: Urology;  Laterality: N/A;    CYSTOSCOPY W/ URETERAL STENT REMOVAL Left 3/2/2021    Procedure: CYSTOSCOPY, WITH URETERAL STENT REMOVAL;  Surgeon: Kermit Beltran MD;  Location: [facility] OR;  Service: Urology;  Laterality: Left;    CYSTOSCOPY WITH BIOPSY OF BLADDER N/A 11/14/2019    Procedure: CYSTOSCOPY, WITH BLADDER BIOPSY, WITH FULGURATION IF INDICATED;  Surgeon: Kermit Beltran MD;  Location: [facility] OR;  Service: Urology;  Laterality: N/A;    CYSTOSCOPY WITH URETEROSCOPY, RETROGRADE PYELOGRAPHY, AND INSERTION OF STENT Left 1/28/2021    Procedure: CYSTOSCOPY, WITH RETROGRADE PYELOGRAM AND URETERAL STENT INSERTION;  Surgeon: Kermit Beltran MD;  Location: [facility] OR;  Service: Urology;  Laterality: Left;    CYSTOSCOPY WITH URETEROSCOPY, RETROGRADE PYELOGRAPHY, AND INSERTION OF STENT Left 2/11/2021    Procedure: CYSTOSCOPY, WITH RETROGRADE PYELOGRAM AND URETERAL STENT INSERTION/exchange;  Surgeon: Kermit Beltran MD;  Location: [facility] OR;  Service: Urology;  Laterality: Left;    DILATION OF URETHRA N/A 7/25/2019    Procedure: DILATION, URETHRA;  Surgeon: Kermit Beltran MD;  Location: [facility] OR;  Service: Urology;  Laterality: N/A;    DILATION OF URETHRA  1/12/2021    Procedure: DILATION, URETHRA;  Surgeon: Kermit Beltran MD;  Location: [facility] OR;  Service: Urology;;    esphogus scope      FLEXIBLE CYSTOSCOPY N/A 2/13/2019    Procedure: CYSTOSCOPY, FLEXIBLE;  Surgeon: Adin Santos MD;  Location: [facility] OR;  Service: Urology;  Laterality: N/A;    LASER LITHOTRIPSY Left 2/11/2021    Procedure: LITHOTRIPSY, USING LASER;  Surgeon: Kermit Beltran MD;  Location: [facility] OR;  Service: Urology;  Laterality: Left;    leg repair Left     ORCHIECTOMY      spermatocele  repair      URETEROSCOPIC REMOVAL OF URETERIC CALCULUS Left 2021    Procedure: REMOVAL, CALCULUS, URETER, URETEROSCOPIC;  Surgeon: Kermit Beltran MD;  [facility]  Service: Urology;  Laterality: Left;       Review of patient's allergies indicates:   Allergen Reactions    Penicillins Rash     Other reaction(s): Rash       Current Facility-Administered Medications on File Prior to Encounter   Medication    electrolyte-S (ISOLYTE)    electrolyte-S (ISOLYTE)     Current Outpatient Medications on File Prior to Encounter   Medication Sig    tamsulosin HCl (FLOMAX ORAL) Take by mouth.    hydrocodone-acetaminophen (HYCET) solution 7.5-325 mg/15mL Take 15 mLs by mouth every 4 (four) hours as needed for Pain.    ondansetron (ZOFRAN-ODT) 4 MG TbDL Take 1-2 tablets (4-8 mg total) by mouth every 6 (six) hours as needed.    promethazine (PHENERGAN) 25 MG tablet Take 1 tablet (25 mg total) by mouth every 6 (six) hours as needed for Nausea (2nd line).     Family History       Problem Relation (Age of Onset)    COPD Father    Cancer Father, Maternal Grandmother, Paternal Grandfather    Diabetes Sister    Hyperlipidemia Sister    Hypertension Mother, Sister    Stroke Mother (94)          Tobacco Use    Smoking status: Former     Types: Cigarettes     Quit date: 1976     Years since quittin.7    Smokeless tobacco: Never   Substance and Sexual Activity    Alcohol use: Yes     Alcohol/week: 3.0 standard drinks     Types: 3 Glasses of wine per week     Comment: weekly    Drug use: No    Sexual activity: Yes     Partners: Female     Review of Systems   Constitutional:  Negative for appetite change, chills, fatigue and fever.   HENT:  Negative for congestion and rhinorrhea.    Eyes:  Negative for visual disturbance.   Respiratory:  Negative for cough and shortness of breath.    Cardiovascular:  Negative for chest pain and leg swelling.   Gastrointestinal:  Negative for abdominal pain, constipation,  diarrhea, nausea and vomiting.   Genitourinary:  Positive for difficulty urinating (chronic). Negative for dysuria.   Musculoskeletal:  Negative for arthralgias and myalgias.   Skin:  Positive for wound (post-op). Negative for rash.   Neurological:  Negative for dizziness, weakness, numbness and headaches.   Psychiatric/Behavioral:  Negative for confusion.    All other systems reviewed and are negative.  Objective:     Vital Signs (Most Recent):  Temp: 97.7 °F (36.5 °C) (09/30/22 1715)  Pulse: 90 (09/30/22 1755)  Resp: 14 (09/30/22 1755)  BP: 123/64 (09/30/22 1755)  SpO2: 95 % (09/30/22 1755) Vital Signs (24h Range):  Temp:  [97.7 °F (36.5 °C)] 97.7 °F (36.5 °C)  Pulse:  [65-98] 90  Resp:  [14-32] 14  SpO2:  [91 %-98 %] 95 %  BP: (123-164)/(58-84) 123/64     Weight: 72.6 kg (160 lb)  Body mass index is 23.63 kg/m².    Physical Exam  Vitals reviewed.   Constitutional:       General: He is not in acute distress.     Appearance: Normal appearance. He is not ill-appearing.   HENT:      Head: Normocephalic and atraumatic.      Right Ear: External ear normal.      Left Ear: External ear normal.      Nose: Nose normal.      Mouth/Throat:      Mouth: Mucous membranes are moist.      Pharynx: Oropharynx is clear.   Eyes:      General:         Right eye: No discharge.         Left eye: No discharge.      Conjunctiva/sclera: Conjunctivae normal.   Cardiovascular:      Rate and Rhythm: Normal rate and regular rhythm.      Pulses: Normal pulses.      Heart sounds: Normal heart sounds. No murmur heard.  Pulmonary:      Effort: Pulmonary effort is normal. No respiratory distress.      Breath sounds: Normal breath sounds. No wheezing, rhonchi or rales.   Abdominal:      General: Abdomen is flat. Bowel sounds are normal. There is no distension.      Palpations: Abdomen is soft.      Tenderness: There is no abdominal tenderness. There is no guarding or rebound.      Comments: Lap sites without concern   Genitourinary:     Comments:  Gr in place draining straw-colored urine  Musculoskeletal:         General: No swelling, deformity or signs of injury.      Cervical back: Neck supple. No rigidity.      Right lower leg: No edema.      Left lower leg: No edema.   Skin:     General: Skin is warm and dry.      Findings: No rash.   Neurological:      General: No focal deficit present.      Mental Status: He is alert and oriented to person, place, and time.   Psychiatric:         Mood and Affect: Affect normal.         Behavior: Behavior normal.         Judgment: Judgment normal.           Significant Labs: All pertinent labs within the past 24 hours have been reviewed.    Significant Imaging: I have reviewed all pertinent imaging results/findings within the past 24 hours.    Assessment/Plan:     * Gastric volvulus  S/p hiatal hernia repair with toupet fundoplication with Dr. Mcmahan 9/30/22  Follow gen surg recs for post-op management  PT, IS, pain control    Hiatal hernia  See above    History of urethral stricture  Multifactorial  Gr placed by urology intra-op  Urology recommend keeping gr for 1wk and f/u in office for removal  Gr care  Continue flomax    History of bladder cancer  Noted hx. S/p TURBT    History of prostate cancer  Noted hx. S/p XRT      VTE Risk Mitigation (From admission, onward)         Ordered     IP VTE LOW RISK PATIENT  Once         09/30/22 1007            SCDs to be placed       Sen Brown MD  Department of Hospital Medicine   [facility]

## 2022-09-30 NOTE — OP NOTE
[facility]  Surgery Department  Operative Note    SUMMARY     Date of Procedure: 9/30/2022     Procedure: Procedure(s) (LRB):  ROBOTIC REPAIR, HERNIA, HIATAL (N/A)   Toupet Fundoplication    Surgeon(s) and Role:     * David Mcmahan MD - Primary     * Kermit Beltran MD - Assisting    Assist: JEANCARLOS Araujo MD Res      Pre-Operative Diagnosis: Gastric volvulus [K31.89]    Post-Operative Diagnosis: Post-Op Diagnosis Codes:     * Gastric volvulus [K31.89]    Anesthesia: General    Operative Findings (including complications, if any):  Giant hiatal hernia with 50% of the stomach within the chest cavity.  Hiatal closure.  Toupet fundoplication    Description of Technical Procedures:  This is a 67-year-old male who was recently admitted for gastric volvulus related to his large hiatal hernia.  He was managed conservatively during the index admission and discharged home tolerating liquids.  I recommended repair to prevent strangulation of the stomach.  He did consent to the planned operation.  He was taken to the OR, placed supine, general endotracheal anesthesia was induced, and a time-out was completed.  Patient had an issue with postop urinary retention related to a urethral stricture.  I was unable to pass a De Leon.  Dr. Beltran came in and placed the De Leon.  Please see his op note for details.  The abdomen was then prepped and draped in the usual fashion.  Access to the abdomen was achieved in the right hemiabdomen using Optiview technique.  The abdomen was insufflated to 15 mmHg a scope was inserted.  There was no apparent injury during entry.  For additional 8 mm robotic trocars were placed across the abdomen.  A subxiphoid Emiliano liver retractor was placed and was used to elevate the left lobe of the liver.  The robot was then docked.  I proceeded to identify a giant hiatal hernia with at least 50% of the stomach incarcerated within the chest cavity.  I started my dissection by creating a  window in the anterior crural opening.  The peritoneum was scored circumferentially around the hiatus.  A combination of blunt and electrocautery dissection was used to completely reduce the hernia sac out of the mediastinal cavity reducing the incarcerated stomach.  After reduction, there were 3 cm of intra-abdominal esophagus.  The crura were closed using a permanent 0 V lock suture.  The crural closure was brought around the esophagus so that it lay loosely within the opening.  Next the short gastrics were taken down using a LigaSure device.  The fundus of the stomach was wrapped posteriorly behind the esophagus and a 270 degree toupet fundoplication was created using multiple Ethibond sutures.  The fundus was tacked to the crura on both sides.  The abdomen was next inspected for hemostasis which was adequate.  Trocars and the Emiliano retractor were removed under direct visualization and insufflation was allowed to escape.  Skin was closed with 4-0 Monocryl.  Dermabond was applied as a dressing.  Patient tolerated the entire procedure without apparent complication, was extubated in the OR, brought to recovery in stable condition.  All counts were correct.    Significant Surgical Tasks Conducted by the Assistant(s), if Applicable: Assist    Estimated Blood Loss (EBL): min           Implants: * No implants in log *    Specimens:   Specimen (24h ago, onward)      None                    Condition: Good    Disposition: PACU - hemodynamically stable.    Attestation: I was present and scrubbed for the entire procedure.

## 2022-09-30 NOTE — TRANSFER OF CARE
Anesthesia Transfer of Care Note    Patient: Edwin Sepulveda III    Procedure(s) Performed: Procedure(s) (LRB):  ROBOTIC REPAIR, HERNIA, HIATAL (N/A)    Patient location: PACU    Anesthesia Type: general    Transport from OR: Transported from OR on 2-3 L/min O2 by NC with adequate spontaneous ventilation    Post pain: adequate analgesia    Post assessment: no apparent anesthetic complications and tolerated procedure well    Post vital signs: stable    Level of consciousness: awake and alert    Nausea/Vomiting: no nausea/vomiting    Complications: none    Transfer of care protocol was followed      Last vitals:   Visit Vitals  /75 (BP Location: Left arm, Patient Position: Lying)   Pulse 65   Temp 36.5 °C (97.7 °F)   Resp 18   Wt 72.6 kg (160 lb)   SpO2 98%   BMI 23.63 kg/m²

## 2022-09-30 NOTE — DISCHARGE INSTRUCTIONS
"Discharge Instructions: After Your Surgery/Procedure  Youve just had surgery. During surgery you were given medicine called anesthesia to keep you relaxed and free of pain. After surgery you may have some pain or nausea. This is common. Here are some tips for feeling better and getting well after surgery.     Stay on schedule with your medication.   Going home  Your doctor or nurse will show you how to take care of yourself when you go home. He or she will also answer your questions. Have an adult family member or friend drive you home.      For your safety we recommend these precaution for the first 24 hours after your procedure:  Do not drive or use heavy equipment.  Do not make important decisions or sign legal papers.  Do not drink alcohol.  Have someone stay with you, if needed. He or she can watch for problems and help keep you safe.  Your concentration, balance, coordination, and judgement may be impaired for many hours after anesthesia.  Use caution when ambulating or standing up.     You may feel weak and "washed out" after anesthesia and surgery.      Subtle residual effects of general anesthesia or sedation with regional / local anesthesia can last more than 24 hours.  Rest for the remainder of the day or longer if your Doctor/Surgeon has advised you to do so.  Although you may feel normal within the first 24 hours, your reflexes and mental ability may be impaired without you realizing it.  You may feel dizzy, lightheaded or sleepy for 24 hours or longer.      Be sure to go to all follow-up visits with your doctor. And rest after your surgery for as long as your doctor tells you to.  Coping with pain  If you have pain after surgery, pain medicine will help you feel better. Take it as told, before pain becomes severe. Also, ask your doctor or pharmacist about other ways to control pain. This might be with heat, ice, or relaxation. And follow any other instructions your surgeon or nurse gives you.  Tips " for taking pain medicine  To get the best relief possible, remember these points:  Pain medicines can upset your stomach. Taking them with a little food may help.  Most pain relievers taken by mouth need at least 20 to 30 minutes to start to work.  Taking medicine on a schedule can help you remember to take it. Try to time your medicine so that you can take it before starting an activity. This might be before you get dressed, go for a walk, or sit down for dinner.  Constipation is a common side effect of pain medicines. Call your doctor before taking any medicines such as laxatives or stool softeners to help ease constipation. Also ask if you should skip any foods. Drinking lots of fluids and eating foods such as fruits and vegetables that are high in fiber can also help. Remember, do not take laxatives unless your surgeon has prescribed them.  Drinking alcohol and taking pain medicine can cause dizziness and slow your breathing. It can even be deadly. Do not drink alcohol while taking pain medicine.  Pain medicine can make you react more slowly to things. Do not drive or run machinery while taking pain medicine.  Your health care provider may tell you to take acetaminophen to help ease your pain. Ask him or her how much you are supposed to take each day. Acetaminophen or other pain relievers may interact with your prescription medicines or other over-the-counter (OTC) drugs. Some prescription medicines have acetaminophen and other ingredients. Using both prescription and OTC acetaminophen for pain can cause you to overdose. Read the labels on your OTC medicines with care. This will help you to clearly know the list of ingredients, how much to take, and any warnings. It may also help you not take too much acetaminophen. If you have questions or do not understand the information, ask your pharmacist or health care provider to explain it to you before you take the OTC medicine.  Managing nausea  Some people have an  upset stomach after surgery. This is often because of anesthesia, pain, or pain medicine, or the stress of surgery. These tips will help you handle nausea and eat healthy foods as you get better. If you were on a special food plan before surgery, ask your doctor if you should follow it while you get better. These tips may help:  Do not push yourself to eat. Your body will tell you when to eat and how much.  Start off with clear liquids and soup. They are easier to digest.  Next try semi-solid foods, such as mashed potatoes, applesauce, and gelatin, as you feel ready.  Slowly move to solid foods. Dont eat fatty, rich, or spicy foods at first.  Do not force yourself to have 3 large meals a day. Instead eat smaller amounts more often.  Take pain medicines with a small amount of solid food, such as crackers or toast, to avoid nausea.     Call your surgeon if  You still have pain an hour after taking medicine. The medicine may not be strong enough.  You feel too sleepy, dizzy, or groggy. The medicine may be too strong.  You have side effects like nausea, vomiting, or skin changes, such as rash, itching, or hives.       If you have obstructive sleep apnea  You were given anesthesia medicine during surgery to keep you comfortable and free of pain. After surgery, you may have more apnea spells because of this medicine and other medicines you were given. The spells may last longer than usual.   At home:  Keep using the continuous positive airway pressure (CPAP) device when you sleep. Unless your health care provider tells you not to, use it when you sleep, day or night. CPAP is a common device used to treat obstructive sleep apnea.  Talk with your provider before taking any pain medicine, muscle relaxants, or sedatives. Your provider will tell you about the possible dangers of taking these medicines.  © 6798-0751 [employer]. [address] [place] [address], [place] [address]. All rights reserved. This information is  not intended as a substitute for professional medical care. Always follow your healthcare professional's instructions.   Post op instructions for prevention of DVT  What is deep vein thrombosis?  Deep vein thrombosis (DVT) is the medical term for blood clots in the deep veins of the leg.  These blood clots can be dangerous.  A DVT can block a blood vessel and keep blood from getting where it needs to go.  Another problem is that the clot can travel to other parts of the body such as the lungs.  A clot that travels to the lungs is called a pulmonary embolus (PE) and can cause serious problems with breathing which can lead to death.  Am I at risk for DVT/PE?  If you are not very active, you are at risk of DVT.  Anyone confined to bed, sitting for long periods of time, recovering from surgery, etc. increases the risk of DVT.  Other risk factors are cancer diagnosis, certain medications, estrogen replacement in any form,older age, obesity, pregnancy, smoking, history of clotting disorders, and dehydration.  How will I know if I have a DVT?  Swelling in the lower leg  Pain  Warmth, redness, hardness or bulging of the vein  If you have any of these symptoms, call your doctors office right away.  Some people will not have any symptoms until the clot moves to the lungs.  What are the symptoms of a PE?  Panting, shortness of breath, or trouble breathing  Sharp, knife-like chest pain when you breathe  Coughing or coughing up blood  Rapid heartbeat  If you have any of these symptoms or get worse quickly, call 911 for emergency treatment.  How can I prevent a DVT?  Avoid long periods of inactivity and dont cross your legs--get up and walk around every hour or so.  Stay active--walking after surgery is highly encouraged.  This means you should get out of the house and walk in the neighborhood.  Going up and down stairs will not impair healing (unless advised against such activity by your doctor).    Drink plenty of  noncaffeinated, nonalcoholic fluids each day to prevent dehydration.  Wear special support stockings, if they have been advised by your doctor.  If you travel, stop at least once an hour and walk around.  Avoid smoking (assistance with stopping is available through your healthcare provider)  Always notify your doctor if you are not able to follow the post operative instructions that are given to you at the time of discharge.  It may be necessary to prescribe one of the medications available to prevent DVT.     Using an Incentive Spirometer    An incentive spirometer is a device that helps you do deep breathing exercises. These exercises expand your lungs, aid in circulation, and help prevent pneumonia. Deep breathing exercises also help you breathe better and improve the function of your lungs by:  Keeping your lungs clear  Strengthening your breathing muscles  Helping prevent respiratory complications or problems  The incentive spirometer gives you a way to take an active part in recover. A nurse or therapist will teach you breathing exercises. To do these exercises, you will breathe in through your mouth and not your nose. The incentive spirometer only works correctly if you breathe in through your mouth.  Steps to clear lungs  Step 1. Exhale normally. Then, inhale normally.  Relax and breathe out.  Step 2. Place your lips tightly around the mouthpiece.  Make sure the device is upright and not tilted.  Step 3. Inhale as much air as you can through the mouthpiece (don't breath through your nose).  Inhale slowly and deeply.  Hold your breath long enough to keep the balls or disk raised for at least 3 to 5 seconds, or as instructed by your healthcare provider.  Some spirometers have an indicator to let you know that you are breathing in too fast. If the indicator goes off, breathe in more slowly.  Step 4. Repeat the exercise regularly.  Do this exercise every hour while you're awake, or as instructed by your  healthcare provider.  If you were taught deep breathing and coughing exercises, do them regularly as instructed by your healthcare provider.

## 2022-10-01 LAB
ALBUMIN SERPL BCP-MCNC: 3.3 G/DL (ref 3.5–5.2)
ALP SERPL-CCNC: 54 U/L (ref 55–135)
ALT SERPL W/O P-5'-P-CCNC: 57 U/L (ref 10–44)
ANION GAP SERPL CALC-SCNC: 10 MMOL/L (ref 8–16)
AST SERPL-CCNC: 56 U/L (ref 10–40)
BASOPHILS # BLD AUTO: 0 K/UL (ref 0–0.2)
BASOPHILS NFR BLD: 0 % (ref 0–1.9)
BILIRUB SERPL-MCNC: 0.7 MG/DL (ref 0.1–1)
BUN SERPL-MCNC: 16 MG/DL (ref 8–23)
CALCIUM SERPL-MCNC: 8.4 MG/DL (ref 8.7–10.5)
CHLORIDE SERPL-SCNC: 103 MMOL/L (ref 95–110)
CO2 SERPL-SCNC: 24 MMOL/L (ref 23–29)
CREAT SERPL-MCNC: 0.8 MG/DL (ref 0.5–1.4)
DIFFERENTIAL METHOD: ABNORMAL
EOSINOPHIL # BLD AUTO: 0 K/UL (ref 0–0.5)
EOSINOPHIL NFR BLD: 0 % (ref 0–8)
ERYTHROCYTE [DISTWIDTH] IN BLOOD BY AUTOMATED COUNT: 12.9 % (ref 11.5–14.5)
EST. GFR  (NO RACE VARIABLE): >60 ML/MIN/1.73 M^2
GLUCOSE SERPL-MCNC: 114 MG/DL (ref 70–110)
HCT VFR BLD AUTO: 40.2 % (ref 40–54)
HGB BLD-MCNC: 13.7 G/DL (ref 14–18)
IMM GRANULOCYTES # BLD AUTO: 0.02 K/UL (ref 0–0.04)
IMM GRANULOCYTES NFR BLD AUTO: 0.2 % (ref 0–0.5)
LYMPHOCYTES # BLD AUTO: 0.3 K/UL (ref 1–4.8)
LYMPHOCYTES NFR BLD: 3.1 % (ref 18–48)
MCH RBC QN AUTO: 31.1 PG (ref 27–31)
MCHC RBC AUTO-ENTMCNC: 34.1 G/DL (ref 32–36)
MCV RBC AUTO: 91 FL (ref 82–98)
MONOCYTES # BLD AUTO: 0.4 K/UL (ref 0.3–1)
MONOCYTES NFR BLD: 4.2 % (ref 4–15)
NEUTROPHILS # BLD AUTO: 7.8 K/UL (ref 1.8–7.7)
NEUTROPHILS NFR BLD: 92.5 % (ref 38–73)
NRBC BLD-RTO: 0 /100 WBC
PLATELET # BLD AUTO: 148 K/UL (ref 150–450)
PMV BLD AUTO: 11.2 FL (ref 9.2–12.9)
POTASSIUM SERPL-SCNC: 4.7 MMOL/L (ref 3.5–5.1)
PROT SERPL-MCNC: 6.1 G/DL (ref 6–8.4)
RBC # BLD AUTO: 4.4 M/UL (ref 4.6–6.2)
SODIUM SERPL-SCNC: 137 MMOL/L (ref 136–145)
WBC # BLD AUTO: 8.42 K/UL (ref 3.9–12.7)

## 2022-10-01 PROCEDURE — 25000003 PHARM REV CODE 250: Performed by: STUDENT IN AN ORGANIZED HEALTH CARE EDUCATION/TRAINING PROGRAM

## 2022-10-01 PROCEDURE — 94799 UNLISTED PULMONARY SVC/PX: CPT

## 2022-10-01 PROCEDURE — 85025 COMPLETE CBC W/AUTO DIFF WBC: CPT | Performed by: STUDENT IN AN ORGANIZED HEALTH CARE EDUCATION/TRAINING PROGRAM

## 2022-10-01 PROCEDURE — 25000003 PHARM REV CODE 250: Performed by: INTERNAL MEDICINE

## 2022-10-01 PROCEDURE — 99900035 HC TECH TIME PER 15 MIN (STAT)

## 2022-10-01 PROCEDURE — 36415 COLL VENOUS BLD VENIPUNCTURE: CPT | Performed by: STUDENT IN AN ORGANIZED HEALTH CARE EDUCATION/TRAINING PROGRAM

## 2022-10-01 PROCEDURE — 80053 COMPREHEN METABOLIC PANEL: CPT | Performed by: STUDENT IN AN ORGANIZED HEALTH CARE EDUCATION/TRAINING PROGRAM

## 2022-10-01 PROCEDURE — 94761 N-INVAS EAR/PLS OXIMETRY MLT: CPT

## 2022-10-01 PROCEDURE — 11000001 HC ACUTE MED/SURG PRIVATE ROOM

## 2022-10-01 RX ORDER — PANTOPRAZOLE SODIUM 40 MG/1
40 TABLET, DELAYED RELEASE ORAL DAILY
Status: DISCONTINUED | OUTPATIENT
Start: 2022-10-01 | End: 2022-10-02 | Stop reason: HOSPADM

## 2022-10-01 RX ADMIN — MUPIROCIN: 20 OINTMENT TOPICAL at 08:10

## 2022-10-01 RX ADMIN — PANTOPRAZOLE SODIUM 40 MG: 40 TABLET, DELAYED RELEASE ORAL at 11:10

## 2022-10-01 RX ADMIN — TAMSULOSIN HYDROCHLORIDE 0.4 MG: 0.4 CAPSULE ORAL at 08:10

## 2022-10-01 NOTE — CARE UPDATE
09/30/22 1923   Patient Assessment/Suction   Level of Consciousness (AVPU) alert   Respiratory Effort Unlabored   PRE-TX-O2   O2 Device (Oxygen Therapy) room air   SpO2 (!) 93 %   Pulse Oximetry Type Intermittent   $ Pulse Oximetry - Single Charge Pulse Oximetry - Single   Pulse 91   Resp 14   Incentive Spirometer   $ Incentive Spirometer Charges done with encouragement;postop instruction   Incentive Spirometer Predicted Level (mL) 2350   Administration (IS) instruction provided, initial;proper technique demonstrated   Number of Repetitions (IS) 10   Level Incentive Spirometer (mL) 1000   Patient Tolerance (IS) good

## 2022-10-01 NOTE — PLAN OF CARE
[facility]  Initial Discharge Assessment       Primary Care Provider: Francis Teague MD    Admission Diagnosis: Gastric volvulus [K31.89]    Admission Date: 9/30/2022    Pt confirmed home address and lives with Spouse Tamara Sepulveda [phone] also at bedside. Pt denied HH and DME. Pharmacy of choice is [facility] on [address]. Pt verified PCP as Dr. Teague and insurance as [employer]. Pts discharge plan is home with spouse and she will provide transport. CM following.   Expected Discharge Date:     Discharge Barriers Identified: None    Payor: [employer] MANAGED MEDICARE / Plan: [employer] MEDICARE HMO / Product Type: Capitation /     Extended Emergency Contact Information  Primary Emergency Contact: AmolluigiTamara  Address: [address] [place] [address]  Home Phone: [phone]  Mobile Phone: [phone]  Relation: Spouse  Preferred language: English   needed? No    Discharge Plan A: Home with family  Discharge Plan B: Home      [id number] - [place] - [address] AT [facility] [address]  [address] [place] [address]  Phone: [phone] Fax: [phone]      Initial Assessment (most recent)       Adult Discharge Assessment - 10/01/22 1012          Discharge Assessment    Assessment Type Discharge Planning Assessment     Confirmed/corrected address, phone number and insurance Yes     Confirmed Demographics Correct on Facesheet     Source of Information patient;family     Reason For Admission Gastric volvulus     Lives With spouse     Facility Arrived From: home     Do you expect to return to your current living situation? Yes     Do you have help at home or someone to help you manage your care at home? Yes     Who are your caregiver(s) and their phone number(s)? Spouse Tamara Sepulveda [phone]     Prior to hospitilization cognitive status: Alert/Oriented     Current cognitive status:  Alert/Oriented     Walking or Climbing Stairs Difficulty none     Dressing/Bathing Difficulty none     Home Layout Able to live on 1st floor     Equipment Currently Used at Home none     Readmission within 30 days? No     Patient currently being followed by outpatient case management? No     Do you currently have service(s) that help you manage your care at home? No     Do you take prescription medications? Yes     Do you have prescription coverage? Yes     Do you have any problems affording any of your prescribed medications? No     Is the patient taking medications as prescribed? yes     Who is going to help you get home at discharge? Spouse Tamara Sepulveda [phone]     How do you get to doctors appointments? car, drives self     Are you on dialysis? No     Do you take coumadin? No     Discharge Plan A Home with family     Discharge Plan B Home     DME Needed Upon Discharge  none     Discharge Plan discussed with: Patient;Spouse/sig other     Name(s) and Number(s) Spouse Tamara Sepulveda [phone]     Discharge Barriers Identified None        Physical Activity    On average, how many days per week do you engage in moderate to strenuous exercise (like a brisk walk)? Patient refused     On average, how many minutes do you engage in exercise at this level? Patient refused        Financial Resource Strain    How hard is it for you to pay for the very basics like food, housing, medical care, and heating? Not hard at all        Housing Stability    In the last 12 months, was there a time when you were not able to pay the mortgage or rent on time? No     In the last 12 months, was there a time when you did not have a steady place to sleep or slept in a shelter (including now)? No        Transportation Needs    In the past 12 months, has lack of transportation kept you from medical appointments or from getting medications? No     In the past 12 months, has lack of transportation kept you from meetings, work, or from  getting things needed for daily living? No        Food Insecurity    Within the past 12 months, you worried that your food would run out before you got the money to buy more. Never true     Within the past 12 months, the food you bought just didn't last and you didn't have money to get more. Never true        Stress    Do you feel stress - tense, restless, nervous, or anxious, or unable to sleep at night because your mind is troubled all the time - these days? Patient refused        Social Connections    In a typical week, how many times do you talk on the phone with family, friends, or neighbors? Patient refused     How often do you get together with friends or relatives? Patient refused     How often do you attend Scientology or [employer] services? Patient refused     Do you belong to any clubs or organizations such as Scientology groups, unions, fraternal or athletic groups, or school groups? Patient refused     How often do you attend meetings of the clubs or organizations you belong to? Patient refused     Are you , , , , never , or living with a partner?         Alcohol Use    Q1: How often do you have a drink containing alcohol? Patient refused     Q2: How many drinks containing alcohol do you have on a typical day when you are drinking? Patient refused        Relationship/Environment    Name(s) of Who Lives With Patient Spouse Tamara Sepulveda [phone]

## 2022-10-01 NOTE — PROGRESS NOTES
[facility] Surgery  Progress Note    Subjective:     Interval History:  No acute events overnight.  Patient awake and alert.  Reports essentially no pain.  Tolerating clear liquids.  Afebrile.  Hemodynamically stable.    Post-Op Info:  Procedure(s) (LRB):  ROBOTIC REPAIR, HERNIA, HIATAL (N/A)   1 Day Post-Op      Medications:  Continuous Infusions:  Scheduled Meds:   mupirocin   Nasal BID    pantoprazole  40 mg Oral Daily    tamsulosin  0.4 mg Oral Daily     PRN Meds:acetaminophen, aluminum-magnesium hydroxide-simethicone, dextrose 10%, dextrose 10%, glucagon (human recombinant), glucose, glucose, hydrocodone-apap 7.5-325 MG/15 ML, hydrocodone-apap 7.5-325 MG/15 ML, magnesium oxide, magnesium oxide, melatonin, naloxone, ondansetron, potassium bicarbonate, potassium bicarbonate, potassium bicarbonate, potassium, sodium phosphates, potassium, sodium phosphates, potassium, sodium phosphates, promethazine (PHENERGAN) IVPB, sodium chloride 0.9%     Objective:     Vital Signs (Most Recent):  Temp: 98 °F (36.7 °C) (10/01/22 0731)  Pulse: 67 (10/01/22 0731)  Resp: 16 (10/01/22 0731)  BP: (!) 101/57 (10/01/22 0731)  SpO2: 96 % (10/01/22 0731)   Vital Signs (24h Range):  Temp:  [97.6 °F (36.4 °C)-98 °F (36.7 °C)] 98 °F (36.7 °C)  Pulse:  [59-98] 67  Resp:  [13-32] 16  SpO2:  [91 %-98 %] 96 %  BP: (100-164)/(53-84) 101/57       Intake/Output Summary (Last 24 hours) at 10/1/2022 1045  Last data filed at 10/1/2022 0532  Gross per 24 hour   Intake 50 ml   Output 710 ml   Net -660 ml       Physical Exam  Constitutional:       General: He is not in acute distress.  Pulmonary:      Effort: Pulmonary effort is normal. No respiratory distress.   Abdominal:      Comments: Appropriately tender.  Nondistended.  Incisions intact.   Neurological:      Mental Status: He is alert and oriented to person, place, and time.       Significant Labs:  CBC:   Recent Labs   Lab 10/01/22  0322   WBC 8.42   RBC 4.40*   HGB  13.7*   HCT 40.2   *   MCV 91   MCH 31.1*   MCHC 34.1     CMP:   Recent Labs   Lab 10/01/22  0322   *   CALCIUM 8.4*   ALBUMIN 3.3*   PROT 6.1      K 4.7   CO2 24      BUN 16   CREATININE 0.8   ALKPHOS 54*   ALT 57*   AST 56*   BILITOT 0.7       Significant Diagnostics:  I have reviewed all pertinent imaging results/findings within the past 24 hours.    Assessment/Plan:     Active Diagnoses:    Diagnosis Date Noted POA    PRINCIPAL PROBLEM:  Gastric volvulus [K31.89] 09/22/2022 Yes    History of urethral stricture [Z87.448] 09/30/2022 Yes     Chronic    Hiatal hernia [K44.9] 09/30/2022 Yes     Chronic    History of bladder cancer [Z85.51] 10/29/2019 Not Applicable     Chronic    History of prostate cancer [Z85.46] 01/07/2016 Not Applicable     Chronic      Problems Resolved During this Admission:   -doing well postop day 1.  Will advance diet.  -PT  -IS  -DVT prophylaxis.  -if patient continues to do this well, plan discharge in the next 24 hours      Kole Davis III, MD  General Surgery  [facility]

## 2022-10-01 NOTE — PLAN OF CARE
Plan of care discussed with pt and spouse. Pt voiced understanding. AAOx4. De Leon draining clear yellow urine. Lap sites CDI. Bowel sounds active. Pt not complaining of nausea. No complaints of pain. Call light with in reach, wheels locked, bed in lowest position.

## 2022-10-01 NOTE — SUBJECTIVE & OBJECTIVE
Interval History:  Patient is in good spirits.  Reports no abdominal pain.  Denies any nausea, vomiting, chest pain or shortness of breath.  Reports frequent flatus.  Tolerating clear liquid diet.  Currently afebrile.  Ambulating in hallways.    Review of Systems   Constitutional:  Negative for appetite change, chills, fatigue and fever.   HENT:  Negative for congestion and rhinorrhea.    Eyes:  Negative for visual disturbance.   Respiratory:  Negative for cough and shortness of breath.    Cardiovascular:  Negative for chest pain and leg swelling.   Gastrointestinal:  Negative for abdominal pain, constipation, diarrhea, nausea and vomiting.   Genitourinary:  Positive for difficulty urinating (chronic). Negative for dysuria.   Musculoskeletal:  Negative for arthralgias and myalgias.   Skin:  Positive for wound (post-op). Negative for rash.   Neurological:  Negative for dizziness, weakness, numbness and headaches.   Psychiatric/Behavioral:  Negative for confusion.    All other systems reviewed and are negative.  Objective:     Vital Signs (Most Recent):  Temp: 98 °F (36.7 °C) (10/01/22 0731)  Pulse: 67 (10/01/22 0731)  Resp: 16 (10/01/22 0731)  BP: (!) 101/57 (10/01/22 0731)  SpO2: 96 % (10/01/22 0731)   Vital Signs (24h Range):  Temp:  [97.6 °F (36.4 °C)-98 °F (36.7 °C)] 98 °F (36.7 °C)  Pulse:  [59-98] 67  Resp:  [13-32] 16  SpO2:  [91 %-98 %] 96 %  BP: (100-164)/(53-84) 101/57     Weight: 79.3 kg (174 lb 13.2 oz)  Body mass index is 25.82 kg/m².    Intake/Output Summary (Last 24 hours) at 10/1/2022 0948  Last data filed at 10/1/2022 0532  Gross per 24 hour   Intake 50 ml   Output 710 ml   Net -660 ml      Physical Exam  Vitals reviewed.   Constitutional:       General: He is not in acute distress.     Appearance: Normal appearance. He is not ill-appearing.   HENT:      Head: Normocephalic and atraumatic.      Right Ear: External ear normal.      Left Ear: External ear normal.      Nose: Nose normal.       Mouth/Throat:      Mouth: Mucous membranes are moist.      Pharynx: Oropharynx is clear.   Eyes:      General:         Right eye: No discharge.         Left eye: No discharge.      Conjunctiva/sclera: Conjunctivae normal.   Cardiovascular:      Rate and Rhythm: Normal rate and regular rhythm.      Pulses: Normal pulses.      Heart sounds: Normal heart sounds. No murmur heard.  Pulmonary:      Effort: Pulmonary effort is normal. No respiratory distress.      Breath sounds: Normal breath sounds. No wheezing, rhonchi or rales.   Abdominal:      General: Abdomen is flat. Bowel sounds are normal. There is no distension.      Palpations: Abdomen is soft.      Tenderness: There is no abdominal tenderness. There is no guarding or rebound.      Comments: Lap sites without concern   Genitourinary:     Comments: De Leon in place draining straw-colored urine  Musculoskeletal:         General: No swelling, deformity or signs of injury.      Cervical back: Neck supple. No rigidity.      Right lower leg: No edema.      Left lower leg: No edema.   Skin:     General: Skin is warm and dry.      Findings: No rash.   Neurological:      General: No focal deficit present.      Mental Status: He is alert and oriented to person, place, and time.   Psychiatric:         Mood and Affect: Affect normal.         Behavior: Behavior normal.         Judgment: Judgment normal.       Significant Labs: All pertinent labs within the past 24 hours have been reviewed.  CBC:   Recent Labs   Lab 10/01/22  0322   WBC 8.42   HGB 13.7*   HCT 40.2   *     CMP:   Recent Labs   Lab 10/01/22  0322      K 4.7      CO2 24   *   BUN 16   CREATININE 0.8   CALCIUM 8.4*   PROT 6.1   ALBUMIN 3.3*   BILITOT 0.7   ALKPHOS 54*   AST 56*   ALT 57*   ANIONGAP 10       Significant Imaging:   [place] Upper GI series (09-22-22):  1. Large hiatal hernia with gastric volvulus, likely organoaxial.  2. No other significant findings.

## 2022-10-01 NOTE — PROGRESS NOTES
[facility] Hospital Medicine  Progress Note    Patient Name: Edwin Sepulveda III  MRN: [id number]  Patient Class: IP- Inpatient   Admission Date: 9/30/2022  Length of Stay: 1 days  Attending Physician: David Mcmahan MD  Primary Care Provider: Francis Teague MD        Subjective:     Principal Problem:Gastric volvulus        HPI:  67M with PMH bladder cancer s/p resection, prostate cancer s/p XRT, urethral stricture, and kidney stones is admitted to hospital medicine for continued monitoring after scheduled hiatal hernia repair with toupet fundoplication performed by Dr. Mcmahan 9/30/22. Did require urology assistance for gr placement intra-op. Otherwise procedure went well. Patient without pain or other complaint at this time. He is seen in PACU.      Overview/Hospital Course:  Admitted for monitoring after robotic hiatal hernia repair with toupet fundoplication with Dr. Mcmahan 9/30/22. Gr kept in place per urology recommendations for difficult placement and history of urethral stricture.      Interval History:  Patient is in good spirits.  Reports no abdominal pain.  Denies any nausea, vomiting, chest pain or shortness of breath.  Reports frequent flatus.  Tolerating clear liquid diet.  Currently afebrile.  Ambulating in hallways.    Review of Systems   Constitutional:  Negative for appetite change, chills, fatigue and fever.   HENT:  Negative for congestion and rhinorrhea.    Eyes:  Negative for visual disturbance.   Respiratory:  Negative for cough and shortness of breath.    Cardiovascular:  Negative for chest pain and leg swelling.   Gastrointestinal:  Negative for abdominal pain, constipation, diarrhea, nausea and vomiting.   Genitourinary:  Positive for difficulty urinating (chronic). Negative for dysuria.   Musculoskeletal:  Negative for arthralgias and myalgias.   Skin:  Positive for wound (post-op). Negative for rash.   Neurological:  Negative for dizziness, weakness, numbness and  headaches.   Psychiatric/Behavioral:  Negative for confusion.    All other systems reviewed and are negative.  Objective:     Vital Signs (Most Recent):  Temp: 98 °F (36.7 °C) (10/01/22 0731)  Pulse: 67 (10/01/22 0731)  Resp: 16 (10/01/22 0731)  BP: (!) 101/57 (10/01/22 0731)  SpO2: 96 % (10/01/22 0731)   Vital Signs (24h Range):  Temp:  [97.6 °F (36.4 °C)-98 °F (36.7 °C)] 98 °F (36.7 °C)  Pulse:  [59-98] 67  Resp:  [13-32] 16  SpO2:  [91 %-98 %] 96 %  BP: (100-164)/(53-84) 101/57     Weight: 79.3 kg (174 lb 13.2 oz)  Body mass index is 25.82 kg/m².    Intake/Output Summary (Last 24 hours) at 10/1/2022 0948  Last data filed at 10/1/2022 0532  Gross per 24 hour   Intake 50 ml   Output 710 ml   Net -660 ml      Physical Exam  Vitals reviewed.   Constitutional:       General: He is not in acute distress.     Appearance: Normal appearance. He is not ill-appearing.   HENT:      Head: Normocephalic and atraumatic.      Right Ear: External ear normal.      Left Ear: External ear normal.      Nose: Nose normal.      Mouth/Throat:      Mouth: Mucous membranes are moist.      Pharynx: Oropharynx is clear.   Eyes:      General:         Right eye: No discharge.         Left eye: No discharge.      Conjunctiva/sclera: Conjunctivae normal.   Cardiovascular:      Rate and Rhythm: Normal rate and regular rhythm.      Pulses: Normal pulses.      Heart sounds: Normal heart sounds. No murmur heard.  Pulmonary:      Effort: Pulmonary effort is normal. No respiratory distress.      Breath sounds: Normal breath sounds. No wheezing, rhonchi or rales.   Abdominal:      General: Abdomen is flat. Bowel sounds are normal. There is no distension.      Palpations: Abdomen is soft.      Tenderness: There is no abdominal tenderness. There is no guarding or rebound.      Comments: Lap sites without concern   Genitourinary:     Comments: De Leon in place draining straw-colored urine  Musculoskeletal:         General: No swelling, deformity or signs  of injury.      Cervical back: Neck supple. No rigidity.      Right lower leg: No edema.      Left lower leg: No edema.   Skin:     General: Skin is warm and dry.      Findings: No rash.   Neurological:      General: No focal deficit present.      Mental Status: He is alert and oriented to person, place, and time.   Psychiatric:         Mood and Affect: Affect normal.         Behavior: Behavior normal.         Judgment: Judgment normal.       Significant Labs: All pertinent labs within the past 24 hours have been reviewed.  CBC:   Recent Labs   Lab 10/01/22  0322   WBC 8.42   HGB 13.7*   HCT 40.2   *     CMP:   Recent Labs   Lab 10/01/22  0322      K 4.7      CO2 24   *   BUN 16   CREATININE 0.8   CALCIUM 8.4*   PROT 6.1   ALBUMIN 3.3*   BILITOT 0.7   ALKPHOS 54*   AST 56*   ALT 57*   ANIONGAP 10       Significant Imaging:   FL Upper GI series (09-22-22):  1. Large hiatal hernia with gastric volvulus, likely organoaxial.  2. No other significant findings.      Assessment/Plan:      * Gastric volvulus  S/p hiatal hernia repair with toupet fundoplication with Dr. Mcmahan 9/30/22  Follow gen surg recs for post-op management  PT, IS, pain control    Hiatal hernia  See above    History of urethral stricture  Multifactorial  Gr placed by urology intra-op  Urology recommend keeping gr for 1wk and f/u in office for removal  Gr care  Continue flomax    History of bladder cancer  Noted hx. S/p TURBT    History of prostate cancer  Noted hx. S/p XRT    Continue incentive spirometry a as directed.  Discussed with patient's wife at bedside.  VTE Risk Mitigation (From admission, onward)         Ordered     Place sequential compression device  Until discontinued        Patient is active and ambulatory. 09/30/22 1903                Discharge Planning   DAVID:  10/2/22    Code Status: Prior   Is the patient medically ready for discharge?:     Reason for patient still in hospital (select all that  apply): Patient trending condition and Consult recommendations  Discharge Plan A: Home with family                  Marbella Nogueira MD  Department of Hospital Medicine   [facility]

## 2022-10-01 NOTE — PLAN OF CARE
Plan of care reviewed with pt, pt verbalized understanding. De Leon draining clear yellow urine. Pt tolerating liquids and mechanical soft food. 5 lap sites present to abdomen with derma-bond in place. Pt ambulates around unit. No pain reported. Call light within reach, bed locked and in lowest position, side rails up x2, pt instructed to call for assistance.

## 2022-10-02 ENCOUNTER — PATIENT MESSAGE (OUTPATIENT)
Dept: UROLOGY | Facility: CLINIC | Age: 67
End: 2022-10-02
Payer: MEDICARE

## 2022-10-02 ENCOUNTER — PATIENT MESSAGE (OUTPATIENT)
Dept: SURGERY | Facility: CLINIC | Age: 67
End: 2022-10-02
Payer: MEDICARE

## 2022-10-02 VITALS
HEART RATE: 63 BPM | HEIGHT: 69 IN | DIASTOLIC BLOOD PRESSURE: 71 MMHG | RESPIRATION RATE: 16 BRPM | TEMPERATURE: 97 F | BODY MASS INDEX: 25.89 KG/M2 | OXYGEN SATURATION: 94 % | SYSTOLIC BLOOD PRESSURE: 138 MMHG | WEIGHT: 174.81 LBS

## 2022-10-02 LAB
ALBUMIN SERPL BCP-MCNC: 3.4 G/DL (ref 3.5–5.2)
ALP SERPL-CCNC: 52 U/L (ref 55–135)
ALT SERPL W/O P-5'-P-CCNC: 77 U/L (ref 10–44)
ANION GAP SERPL CALC-SCNC: 9 MMOL/L (ref 8–16)
AST SERPL-CCNC: 66 U/L (ref 10–40)
BASOPHILS # BLD AUTO: 0.02 K/UL (ref 0–0.2)
BASOPHILS NFR BLD: 0.2 % (ref 0–1.9)
BILIRUB SERPL-MCNC: 0.6 MG/DL (ref 0.1–1)
BUN SERPL-MCNC: 12 MG/DL (ref 8–23)
CALCIUM SERPL-MCNC: 8.8 MG/DL (ref 8.7–10.5)
CHLORIDE SERPL-SCNC: 106 MMOL/L (ref 95–110)
CO2 SERPL-SCNC: 25 MMOL/L (ref 23–29)
CREAT SERPL-MCNC: 0.8 MG/DL (ref 0.5–1.4)
DIFFERENTIAL METHOD: ABNORMAL
EOSINOPHIL # BLD AUTO: 0 K/UL (ref 0–0.5)
EOSINOPHIL NFR BLD: 0.4 % (ref 0–8)
ERYTHROCYTE [DISTWIDTH] IN BLOOD BY AUTOMATED COUNT: 13 % (ref 11.5–14.5)
EST. GFR  (NO RACE VARIABLE): >60 ML/MIN/1.73 M^2
GLUCOSE SERPL-MCNC: 101 MG/DL (ref 70–110)
HCT VFR BLD AUTO: 39 % (ref 40–54)
HGB BLD-MCNC: 13 G/DL (ref 14–18)
IMM GRANULOCYTES # BLD AUTO: 0.03 K/UL (ref 0–0.04)
IMM GRANULOCYTES NFR BLD AUTO: 0.4 % (ref 0–0.5)
LYMPHOCYTES # BLD AUTO: 1.2 K/UL (ref 1–4.8)
LYMPHOCYTES NFR BLD: 13.8 % (ref 18–48)
MCH RBC QN AUTO: 30.2 PG (ref 27–31)
MCHC RBC AUTO-ENTMCNC: 33.3 G/DL (ref 32–36)
MCV RBC AUTO: 91 FL (ref 82–98)
MONOCYTES # BLD AUTO: 0.9 K/UL (ref 0.3–1)
MONOCYTES NFR BLD: 10.2 % (ref 4–15)
NEUTROPHILS # BLD AUTO: 6.4 K/UL (ref 1.8–7.7)
NEUTROPHILS NFR BLD: 75 % (ref 38–73)
NRBC BLD-RTO: 0 /100 WBC
PLATELET # BLD AUTO: 146 K/UL (ref 150–450)
PMV BLD AUTO: 10.8 FL (ref 9.2–12.9)
POTASSIUM SERPL-SCNC: 3.9 MMOL/L (ref 3.5–5.1)
PROT SERPL-MCNC: 6 G/DL (ref 6–8.4)
RBC # BLD AUTO: 4.31 M/UL (ref 4.6–6.2)
SODIUM SERPL-SCNC: 140 MMOL/L (ref 136–145)
WBC # BLD AUTO: 8.49 K/UL (ref 3.9–12.7)

## 2022-10-02 PROCEDURE — 25000003 PHARM REV CODE 250: Performed by: INTERNAL MEDICINE

## 2022-10-02 PROCEDURE — 25000003 PHARM REV CODE 250: Performed by: STUDENT IN AN ORGANIZED HEALTH CARE EDUCATION/TRAINING PROGRAM

## 2022-10-02 PROCEDURE — 80053 COMPREHEN METABOLIC PANEL: CPT | Performed by: STUDENT IN AN ORGANIZED HEALTH CARE EDUCATION/TRAINING PROGRAM

## 2022-10-02 PROCEDURE — 36415 COLL VENOUS BLD VENIPUNCTURE: CPT | Performed by: STUDENT IN AN ORGANIZED HEALTH CARE EDUCATION/TRAINING PROGRAM

## 2022-10-02 PROCEDURE — 94799 UNLISTED PULMONARY SVC/PX: CPT

## 2022-10-02 PROCEDURE — 94760 N-INVAS EAR/PLS OXIMETRY 1: CPT

## 2022-10-02 PROCEDURE — 94761 N-INVAS EAR/PLS OXIMETRY MLT: CPT

## 2022-10-02 PROCEDURE — 85025 COMPLETE CBC W/AUTO DIFF WBC: CPT | Performed by: STUDENT IN AN ORGANIZED HEALTH CARE EDUCATION/TRAINING PROGRAM

## 2022-10-02 RX ORDER — PANTOPRAZOLE SODIUM 40 MG/1
40 TABLET, DELAYED RELEASE ORAL DAILY
Qty: 14 TABLET | Refills: 0 | Status: ON HOLD | OUTPATIENT
Start: 2022-10-03 | End: 2022-11-01 | Stop reason: HOSPADM

## 2022-10-02 RX ADMIN — MUPIROCIN: 20 OINTMENT TOPICAL at 09:10

## 2022-10-02 RX ADMIN — PANTOPRAZOLE SODIUM 40 MG: 40 TABLET, DELAYED RELEASE ORAL at 09:10

## 2022-10-02 RX ADMIN — TAMSULOSIN HYDROCHLORIDE 0.4 MG: 0.4 CAPSULE ORAL at 09:10

## 2022-10-02 NOTE — PLAN OF CARE
Plan of care reviewed with patient,verbalized understanding. De Leon catheter intact and patent. Lap sites x5 with derma bond intact. No complaints of pain .Remains free from falls, injury. Instructed to call for assistance as needed ,verbalized understanding. Bed in low & locked position. Call light in reach, bed alarm on .

## 2022-10-02 NOTE — CARE UPDATE
10/01/22 1914   Patient Assessment/Suction   Level of Consciousness (AVPU) alert   Respiratory Effort Unlabored   Expansion/Accessory Muscles/Retractions no use of accessory muscles;no retractions   PRE-TX-O2   O2 Device (Oxygen Therapy) room air   SpO2 96 %   Pulse Oximetry Type Intermittent   $ Pulse Oximetry - Multiple Charge Pulse Oximetry - Multiple   Pulse 77   Resp 18   Incentive Spirometer   $ Incentive Spirometer Charges done with encouragement;proper technique demonstrated   Administration (IS) proper technique demonstrated   Number of Repetitions (IS) 10   Level Incentive Spirometer (mL) 2500   Patient Tolerance (IS) good

## 2022-10-02 NOTE — PROGRESS NOTES
[facility] Surgery  Progress Note    Subjective:     Interval History: no acute evetns overngiht. Tolerating full liquids. Minimal pain. No nasuea. Afebrile.     Post-Op Info:  Procedure(s) (LRB):  ROBOTIC REPAIR, HERNIA, HIATAL (N/A)   2 Days Post-Op      Medications:  Continuous Infusions:  Scheduled Meds:   mupirocin   Nasal BID    pantoprazole  40 mg Oral Daily    tamsulosin  0.4 mg Oral Daily     PRN Meds:acetaminophen, aluminum-magnesium hydroxide-simethicone, dextrose 10%, dextrose 10%, glucagon (human recombinant), glucose, glucose, hydrocodone-apap 7.5-325 MG/15 ML, hydrocodone-apap 7.5-325 MG/15 ML, magnesium oxide, magnesium oxide, melatonin, naloxone, ondansetron, potassium bicarbonate, potassium bicarbonate, potassium bicarbonate, potassium, sodium phosphates, potassium, sodium phosphates, potassium, sodium phosphates, promethazine (PHENERGAN) IVPB, sodium chloride 0.9%     Objective:     Vital Signs (Most Recent):  Temp: 97 °F (36.1 °C) (10/02/22 0733)  Pulse: 63 (10/02/22 0733)  Resp: 16 (10/02/22 0733)  BP: 138/71 (10/02/22 0733)  SpO2: (!) 94 % (10/02/22 0733)   Vital Signs (24h Range):  Temp:  [96.2 °F (35.7 °C)-98.3 °F (36.8 °C)] 97 °F (36.1 °C)  Pulse:  [61-77] 63  Resp:  [16-18] 16  SpO2:  [93 %-97 %] 94 %  BP: (101-138)/(55-71) 138/71       Intake/Output Summary (Last 24 hours) at 10/2/2022 1052  Last data filed at 10/2/2022 1048  Gross per 24 hour   Intake 1120 ml   Output 4100 ml   Net -2980 ml       Physical Exam  Constitutional:       General: He is not in acute distress.  Pulmonary:      Effort: Pulmonary effort is normal. No respiratory distress.   Abdominal:      Comments: Appropriately tender.  Nondistended.  Incisions intact.   Neurological:      Mental Status: He is alert and oriented to person, place, and time.       Significant Labs:  CBC:   Recent Labs   Lab 10/02/22  0437   WBC 8.49   RBC 4.31*   HGB 13.0*   HCT 39.0*   *   MCV 91   MCH 30.2    MCHC 33.3     CMP:   Recent Labs   Lab 10/02/22  0437      CALCIUM 8.8   ALBUMIN 3.4*   PROT 6.0      K 3.9   CO2 25      BUN 12   CREATININE 0.8   ALKPHOS 52*   ALT 77*   AST 66*   BILITOT 0.6       Significant Diagnostics:  I have reviewed all pertinent imaging results/findings within the past 24 hours.    Assessment/Plan:     Active Diagnoses:    Diagnosis Date Noted POA    PRINCIPAL PROBLEM:  Gastric volvulus [K31.89] 09/22/2022 Yes    History of urethral stricture [Z87.448] 09/30/2022 Yes     Chronic    Hiatal hernia [K44.9] 09/30/2022 Yes     Chronic    History of bladder cancer [Z85.51] 10/29/2019 Not Applicable     Chronic    History of prostate cancer [Z85.46] 01/07/2016 Not Applicable     Chronic      Problems Resolved During this Admission:     -OK to DC from surgery standpoint. Discharge with De Leon. He has urology follow up. Full liquids for next week. Follow in the office two weeks.     Kole Davis III, MD  General Surgery  [facility]

## 2022-10-02 NOTE — DISCHARGE SUMMARY
[facility] Medicine  Discharge Summary      Patient Name: Edwin Sepulveda III  MRN: [id number]  Patient Class: IP- Inpatient  Admission Date: 9/30/2022  Hospital Length of Stay: 2 days  Discharge Date and Time:  10/02/2022 3:23 PM  Attending Physician: No att. providers found   Discharging Provider: Luna Escobedo MD  Primary Care Provider: Francis Teague MD      HPI:   67M with PMH bladder cancer s/p resection, prostate cancer s/p XRT, urethral stricture, and kidney stones is admitted to hospital medicine for continued monitoring after scheduled hiatal hernia repair with toupet fundoplication performed by Dr. Mcmahan 9/30/22. Did require urology assistance for gr placement intra-op. Otherwise procedure went well. Patient without pain or other complaint at this time. He is seen in PACU.      Procedure(s) (LRB):  ROBOTIC REPAIR, HERNIA, HIATAL (N/A)      Hospital Course:   Admitted for monitoring after robotic hiatal hernia repair with toupet fundoplication with Dr. Mcmahan 9/30/22. Gr kept in place per urology recommendations for difficult placement and history of urethral stricture. Plan to remove next week in clinic. Post-operatively, no N/V. + BM. Tolerating meals. Vitals stable. Ambulated with PT without difficulty. Patient has all home meds and good support at home. Ok to dc home today       Goals of Care Treatment Preferences:  Code Status: Full Code      Consults:     * Gastric volvulus  S/p hiatal hernia repair with toupet fundoplication with Dr. Mcmahan 9/30/22  Follow gen surg recs for post-op management  Tolerating diet  PT, IS, pain control    Hiatal hernia  See above    History of urethral stricture  Multifactorial  Gr placed by urology intra-op  Urology recommend keeping gr for 1wk and f/u in office for removal  Gr care  Continue flomax      Final Active Diagnoses:    Diagnosis Date Noted POA    PRINCIPAL PROBLEM:  Gastric volvulus [K31.89] 09/22/2022 Yes     History of urethral stricture [Z87.448] 09/30/2022 Yes     Chronic    Hiatal hernia [K44.9] 09/30/2022 Yes     Chronic    History of bladder cancer [Z85.51] 10/29/2019 Not Applicable     Chronic    History of prostate cancer [Z85.46] 01/07/2016 Not Applicable     Chronic      Problems Resolved During this Admission:       Discharged Condition: good    Disposition: Home or Self Care    Follow Up:   Follow-up Information     Kermit Beltran MD. Schedule an appointment as soon as possible for a visit in 1 week(s).    Specialty: Urology  Why: gr removal  Contact information:  [address] [place] [address] [address]  [address]  [place] [address]  [phone]             David Mcmahan MD. Schedule an appointment as soon as possible for a visit.    Specialty: General Surgery  Contact information:  [address] [facility]  [address]  [place] [address]  [phone]                       Patient Instructions:      Diet full liquid   Order Comments: Advance as tolerated     Notify your health care provider if you experience any of the following:  temperature >100.4     Notify your health care provider if you experience any of the following:  persistent nausea and vomiting or diarrhea     Notify your health care provider if you experience any of the following:  severe uncontrolled pain     Notify your health care provider if you experience any of the following:  persistent dizziness, light-headedness, or visual disturbances     Activity as tolerated       Significant Diagnostic Studies: Labs:   CMP   Recent Labs   Lab 10/01/22  0322 10/02/22  0437    140   K 4.7 3.9    106   CO2 24 25   * 101   BUN 16 12   CREATININE 0.8 0.8   CALCIUM 8.4* 8.8   PROT 6.1 6.0   ALBUMIN 3.3* 3.4*   BILITOT 0.7 0.6   ALKPHOS 54* 52*   AST 56* 66*   ALT 57* 77*   ANIONGAP 10 9    and CBC   Recent Labs   Lab 10/01/22  0322 10/02/22  0437   WBC 8.42 8.49   HGB 13.7* 13.0*   HCT 40.2 39.0*   * 146*       Pending Diagnostic Studies:     None          Medications:  Reconciled Home Medications:      Medication List      START taking these medications    pantoprazole 40 MG tablet  Commonly known as: PROTONIX  Take 1 tablet (40 mg total) by mouth once daily. for 14 days  Start taking on: October 3, 2022        CONTINUE taking these medications    FLOMAX ORAL  Take by mouth.     hydrocodone-apap 7.5-325 MG/15 ML oral solution  Commonly known as: HYCET  Take 15 mLs by mouth every 4 (four) hours as needed for Pain.     ondansetron 4 MG Tbdl  Commonly known as: ZOFRAN-ODT  Take 1-2 tablets (4-8 mg total) by mouth every 6 (six) hours as needed.     promethazine 25 MG tablet  Commonly known as: PHENERGAN  Take 1 tablet (25 mg total) by mouth every 6 (six) hours as needed for Nausea (2nd line).            Indwelling Lines/Drains at time of discharge:   Lines/Drains/Airways     Drain  Duration                Ureteral Drain/Stent 02/11/21 Left ureter 6 Fr. 598 days         Urethral Catheter 09/30/22 1433  16 Fr. 2 days                Time spent on the discharge of patient: 30 minutes         Luna Escobedo MD  Department of Hospital Medicine  [facility]

## 2022-10-02 NOTE — NURSING
Discharge instructions given to pt and wife.  Pt discharged to home via ambulation with nurse and wife.

## 2022-10-02 NOTE — PLAN OF CARE
Patient cleared for discharge from case management standpoint.       10/02/22 1003   Final Note   Assessment Type Final Discharge Note   Anticipated Discharge Disposition Home   Hospital Resources/Appts/Education Provided Appointments scheduled and added to AVS;Provided patient/caregiver with written discharge plan information;Provided education on problems/symptoms using teachback

## 2022-10-02 NOTE — ANESTHESIA POSTPROCEDURE EVALUATION
Anesthesia Post Evaluation    Patient: Edwin Sepulveda III    Procedure(s) Performed: Procedure(s) (LRB):  ROBOTIC REPAIR, HERNIA, HIATAL (N/A)    Final Anesthesia Type: general      Patient location during evaluation: PACU  Patient participation: Yes- Able to Participate  Level of consciousness: awake and alert and oriented  Post-procedure vital signs: reviewed and stable  Pain management: adequate  Airway patency: patent    PONV status at discharge: No PONV  Anesthetic complications: no      Cardiovascular status: blood pressure returned to baseline and stable  Respiratory status: unassisted and spontaneous ventilation  Hydration status: euvolemic  Follow-up not needed.          Vitals Value Taken Time   /71 10/02/22 0733   Temp 36.1 °C (97 °F) 10/02/22 0733   Pulse 63 10/02/22 0733   Resp 16 10/02/22 0733   SpO2 94 % 10/02/22 0733         Event Time   Out of Recovery 18:50:00         Pain/Sanya Score: No data recorded

## 2022-10-02 NOTE — ASSESSMENT & PLAN NOTE
S/p hiatal hernia repair with toupet fundoplication with Dr. Mcmahan 9/30/22  Follow gen surg recs for post-op management  Tolerating diet  PT, IS, pain control

## 2022-10-03 ENCOUNTER — PATIENT MESSAGE (OUTPATIENT)
Dept: SURGERY | Facility: CLINIC | Age: 67
End: 2022-10-03
Payer: MEDICARE

## 2022-10-03 ENCOUNTER — TELEPHONE (OUTPATIENT)
Dept: MEDSURG UNIT | Facility: HOSPITAL | Age: 67
End: 2022-10-03
Payer: MEDICARE

## 2022-10-06 ENCOUNTER — PATIENT MESSAGE (OUTPATIENT)
Dept: UROLOGY | Facility: CLINIC | Age: 67
End: 2022-10-06

## 2022-10-06 ENCOUNTER — CLINICAL SUPPORT (OUTPATIENT)
Dept: UROLOGY | Facility: CLINIC | Age: 67
End: 2022-10-06
Payer: MEDICARE

## 2022-10-06 DIAGNOSIS — Z85.51 HISTORY OF BLADDER CANCER: ICD-10-CM

## 2022-10-06 DIAGNOSIS — Z85.46 HISTORY OF PROSTATE CANCER: Primary | ICD-10-CM

## 2022-10-06 PROCEDURE — 99499 UNLISTED E&M SERVICE: CPT | Mod: S$GLB,,, | Performed by: UROLOGY

## 2022-10-06 PROCEDURE — 99499 NO LOS: ICD-10-PCS | Mod: S$GLB,,, | Performed by: UROLOGY

## 2022-10-06 NOTE — PROGRESS NOTES
Patient here today to have his gr catheter removal  2 patient identifiers verified  10?ml saline removed from catheter balloon.   Catheter removed.   Catheter bag contains?25?ml?yellow?urine.  Patient left the office in satisfactory condition.

## 2022-10-12 ENCOUNTER — PATIENT MESSAGE (OUTPATIENT)
Dept: UROLOGY | Facility: CLINIC | Age: 67
End: 2022-10-12
Payer: MEDICARE

## 2022-10-12 ENCOUNTER — OFFICE VISIT (OUTPATIENT)
Dept: UROLOGY | Facility: CLINIC | Age: 67
End: 2022-10-12
Payer: MEDICARE

## 2022-10-12 VITALS
BODY MASS INDEX: 25.77 KG/M2 | DIASTOLIC BLOOD PRESSURE: 78 MMHG | HEART RATE: 62 BPM | HEIGHT: 69 IN | WEIGHT: 174 LBS | SYSTOLIC BLOOD PRESSURE: 123 MMHG | RESPIRATION RATE: 18 BRPM

## 2022-10-12 DIAGNOSIS — Z85.46 HISTORY OF PROSTATE CANCER: Primary | ICD-10-CM

## 2022-10-12 DIAGNOSIS — N35.812 OTHER STRICTURE OF BULBOUS URETHRA IN MALE: ICD-10-CM

## 2022-10-12 DIAGNOSIS — Z85.51 HISTORY OF BLADDER CANCER: ICD-10-CM

## 2022-10-12 DIAGNOSIS — N20.0 KIDNEY STONES: ICD-10-CM

## 2022-10-12 LAB
BILIRUB SERPL-MCNC: NORMAL MG/DL
BLOOD URINE, POC: NORMAL
CLARITY, POC UA: CLEAR
COLOR, POC UA: YELLOW
GLUCOSE UR QL STRIP: NORMAL
KETONES UR QL STRIP: NORMAL
LEUKOCYTE ESTERASE URINE, POC: NORMAL
NITRITE, POC UA: NORMAL
PH, POC UA: 7
POC RESIDUAL URINE VOLUME: 5 ML (ref 0–100)
PROTEIN, POC: NORMAL
SPECIFIC GRAVITY, POC UA: 1.02
UROBILINOGEN, POC UA: 0.2

## 2022-10-12 PROCEDURE — 81002 POCT URINE DIPSTICK WITHOUT MICROSCOPE: ICD-10-PCS | Mod: S$GLB,,, | Performed by: UROLOGY

## 2022-10-12 PROCEDURE — 88112 PR  CYTOPATH, CELL ENHANCE TECH: ICD-10-PCS | Mod: 26,,, | Performed by: PATHOLOGY

## 2022-10-12 PROCEDURE — 3074F SYST BP LT 130 MM HG: CPT | Mod: CPTII,S$GLB,, | Performed by: UROLOGY

## 2022-10-12 PROCEDURE — 51798 US URINE CAPACITY MEASURE: CPT | Mod: S$GLB,,, | Performed by: UROLOGY

## 2022-10-12 PROCEDURE — 1111F PR DISCHARGE MEDS RECONCILED W/ CURRENT OUTPATIENT MED LIST: ICD-10-PCS | Mod: CPTII,S$GLB,, | Performed by: UROLOGY

## 2022-10-12 PROCEDURE — 88112 CYTOPATH CELL ENHANCE TECH: CPT | Performed by: PATHOLOGY

## 2022-10-12 PROCEDURE — 88112 CYTOPATH CELL ENHANCE TECH: CPT | Mod: 26,,, | Performed by: PATHOLOGY

## 2022-10-12 PROCEDURE — 99215 PR OFFICE/OUTPT VISIT, EST, LEVL V, 40-54 MIN: ICD-10-PCS | Mod: S$GLB,,, | Performed by: UROLOGY

## 2022-10-12 PROCEDURE — 99999 PR PBB SHADOW E&M-EST. PATIENT-LVL IV: CPT | Mod: PBBFAC,,, | Performed by: UROLOGY

## 2022-10-12 PROCEDURE — 1101F PR PT FALLS ASSESS DOC 0-1 FALLS W/OUT INJ PAST YR: ICD-10-PCS | Mod: CPTII,S$GLB,, | Performed by: UROLOGY

## 2022-10-12 PROCEDURE — 3074F PR MOST RECENT SYSTOLIC BLOOD PRESSURE < 130 MM HG: ICD-10-PCS | Mod: CPTII,S$GLB,, | Performed by: UROLOGY

## 2022-10-12 PROCEDURE — 51798 POCT BLADDER SCAN: ICD-10-PCS | Mod: S$GLB,,, | Performed by: UROLOGY

## 2022-10-12 PROCEDURE — 3078F DIAST BP <80 MM HG: CPT | Mod: CPTII,S$GLB,, | Performed by: UROLOGY

## 2022-10-12 PROCEDURE — 99999 PR PBB SHADOW E&M-EST. PATIENT-LVL IV: ICD-10-PCS | Mod: PBBFAC,,, | Performed by: UROLOGY

## 2022-10-12 PROCEDURE — 99215 OFFICE O/P EST HI 40 MIN: CPT | Mod: S$GLB,,, | Performed by: UROLOGY

## 2022-10-12 PROCEDURE — 1159F MED LIST DOCD IN RCRD: CPT | Mod: CPTII,S$GLB,, | Performed by: UROLOGY

## 2022-10-12 PROCEDURE — 1111F DSCHRG MED/CURRENT MED MERGE: CPT | Mod: CPTII,S$GLB,, | Performed by: UROLOGY

## 2022-10-12 PROCEDURE — 81002 URINALYSIS NONAUTO W/O SCOPE: CPT | Mod: S$GLB,,, | Performed by: UROLOGY

## 2022-10-12 PROCEDURE — 3288F PR FALLS RISK ASSESSMENT DOCUMENTED: ICD-10-PCS | Mod: CPTII,S$GLB,, | Performed by: UROLOGY

## 2022-10-12 PROCEDURE — 3078F PR MOST RECENT DIASTOLIC BLOOD PRESSURE < 80 MM HG: ICD-10-PCS | Mod: CPTII,S$GLB,, | Performed by: UROLOGY

## 2022-10-12 PROCEDURE — 1101F PT FALLS ASSESS-DOCD LE1/YR: CPT | Mod: CPTII,S$GLB,, | Performed by: UROLOGY

## 2022-10-12 PROCEDURE — 1126F PR PAIN SEVERITY QUANTIFIED, NO PAIN PRESENT: ICD-10-PCS | Mod: CPTII,S$GLB,, | Performed by: UROLOGY

## 2022-10-12 PROCEDURE — 1159F PR MEDICATION LIST DOCUMENTED IN MEDICAL RECORD: ICD-10-PCS | Mod: CPTII,S$GLB,, | Performed by: UROLOGY

## 2022-10-12 PROCEDURE — 87086 URINE CULTURE/COLONY COUNT: CPT | Performed by: UROLOGY

## 2022-10-12 PROCEDURE — 1126F AMNT PAIN NOTED NONE PRSNT: CPT | Mod: CPTII,S$GLB,, | Performed by: UROLOGY

## 2022-10-12 PROCEDURE — 1160F PR REVIEW ALL MEDS BY PRESCRIBER/CLIN PHARMACIST DOCUMENTED: ICD-10-PCS | Mod: CPTII,S$GLB,, | Performed by: UROLOGY

## 2022-10-12 PROCEDURE — 3288F FALL RISK ASSESSMENT DOCD: CPT | Mod: CPTII,S$GLB,, | Performed by: UROLOGY

## 2022-10-12 PROCEDURE — 1160F RVW MEDS BY RX/DR IN RCRD: CPT | Mod: CPTII,S$GLB,, | Performed by: UROLOGY

## 2022-10-12 NOTE — H&P (VIEW-ONLY)
[facility] Progress Note    Edwin Sepulveda III is a 67 y.o. male who presents for f/u of history bladder ca, prostate ca (XRT), and urethral stricture, and kidney stones     Hx XRT for CaP in 2014, Hx kidney stones, with lo grade noninvasive bladder cancer found 3/2019 L lat wall, after which he had retention and subsequent mmc instillation with difficult gr. Cysto 7/19 found narrow bulbar urethral stricture just distal to the sphincter dilated at time of reTURBT 7/2019 of which path showed necrosis, inflammation, chronic follicular cystitis and mild reactive atypia consistent with radiation change. Biopsies 11/14/19 were negative.   Has had negative surveillance cystos since 7/20 and 1/21, able to passively dilate stricture w/ scope, taking 5d flomax after and otherwise no  meds. Unobstructed uroflow in oct 2020. R renal colic July 2020 and scan noted 3 mm stone in the dependent portion of the bladder with mild R hydroureteroneprhosis. Additional 2 mm stone at the mouth of the left UVJ. On review punctate R/L stone as well as larger 6-7mm L lower pole renal calculus remaining  Symptomatic passage of L 6-7mm stone obstructing in prox ureter on 1/24/21 (few weeks after cysto, at which time stable on KUB) and stone extraction URS on 2/12/21 during which Bladder negative, stricture passable with cystoscope. Cysto.stent removal on 3/2/21. Uroflow 7/21 with inadequate voided volume, but PVR 0c  Stone analysis:  100 % calcium oxalate monohydrate    Last seen via VV 2/21/22 noting PSA 0.33 on 1/26/22, stable from 0.28 in 10/21 and 0.41 in 7/19  Last imaging on file is July 2021 at time of attempted uroflow noting US concern for 5mm L midpole stone, but KUB negative  No interim stone episodes. Urinating going well. Stream is good. Bladder doesn't hold as much as it used to. Goes when feels urge and is ok. No UUI. Can sleep 4-5 hrs before getting up. Feels full at 200cc when has measured output. Feels like he is  "emptying. PVR 0cc in July    Planned annual cystoscopy which would also reeval stricture, and briefly discussed self calibration CIC given management difficulty in irradiated tissue.   He then cancelled noting he was feeling well and didn't want to proceed.    Most recently he was undergoing lap/robotic hiatal hernia repair with Dr Mcmahan, and gr would not pass and I was called into OR on 9/30/22 and could not pass gr either but was able to do so over a guidewire.   His gr was removed 1 week ago and he returns today for follow up  AUA SS 2/1 (1: freq, sleeping). Went back on flomax preop noting need for gr but was unable to tolerate - teeth hurt, hearing shut down/tinnitus, felt hoarse  Above hiatal hernia repair bc he had ER presentation for volvulus.  Wasn't having any voiding difficulty preop, and hasnt noticed a difference postop  No interim hematuria, stone episodes, flank pain  CT 9/21/22 from er for volvulus reviewed noting 4mm mid to upper pole stone. Not distinctly seen on KUB same day nor KUB 4/2022  Udip negative, PVR 5cc      Focused Physical Exam:    Vitals:    10/12/22 1028   BP: 123/78   Pulse: 62   Resp: 18     Body mass index is 25.7 kg/m². Weight: 78.9 kg (174 lb) Height: 5' 9" (175.3 cm)     Abdomen: Soft, non-tender, nondistended, no CVA tenderness      Recent Results (from the past 336 hour(s))   Comprehensive Metabolic Panel (CMP)    Collection Time: 10/02/22  4:37 AM   Result Value Ref Range    Sodium 140 136 - 145 mmol/L    Potassium 3.9 3.5 - 5.1 mmol/L    Chloride 106 95 - 110 mmol/L    CO2 25 23 - 29 mmol/L    Glucose 101 70 - 110 mg/dL    BUN 12 8 - 23 mg/dL    Creatinine 0.8 0.5 - 1.4 mg/dL    Calcium 8.8 8.7 - 10.5 mg/dL    Total Protein 6.0 6.0 - 8.4 g/dL    Albumin 3.4 (L) 3.5 - 5.2 g/dL    Total Bilirubin 0.6 0.1 - 1.0 mg/dL    Alkaline Phosphatase 52 (L) 55 - 135 U/L    AST 66 (H) 10 - 40 U/L    ALT 77 (H) 10 - 44 U/L    Anion Gap 9 8 - 16 mmol/L    eGFR >60 >60 " mL/min/1.73 m^2   CBC with Automated Differential    Collection Time: 10/02/22  4:37 AM   Result Value Ref Range    WBC 8.49 3.90 - 12.70 K/uL    RBC 4.31 (L) 4.60 - 6.20 M/uL    Hemoglobin 13.0 (L) 14.0 - 18.0 g/dL    Hematocrit 39.0 (L) 40.0 - 54.0 %    MCV 91 82 - 98 fL    MCH 30.2 27.0 - 31.0 pg    MCHC 33.3 32.0 - 36.0 g/dL    RDW 13.0 11.5 - 14.5 %    Platelets 146 (L) 150 - 450 K/uL    MPV 10.8 9.2 - 12.9 fL    Immature Granulocytes 0.4 0.0 - 0.5 %    Gran # (ANC) 6.4 1.8 - 7.7 K/uL    Immature Grans (Abs) 0.03 0.00 - 0.04 K/uL    Lymph # 1.2 1.0 - 4.8 K/uL    Mono # 0.9 0.3 - 1.0 K/uL    Eos # 0.0 0.0 - 0.5 K/uL    Baso # 0.02 0.00 - 0.20 K/uL    nRBC 0 0 /100 WBC    Gran % 75.0 (H) 38.0 - 73.0 %    Lymph % 13.8 (L) 18.0 - 48.0 %    Mono % 10.2 4.0 - 15.0 %    Eosinophil % 0.4 0.0 - 8.0 %    Basophil % 0.2 0.0 - 1.9 %    Differential Method Automated    POCT URINE DIPSTICK WITHOUT MICROSCOPE    Collection Time: 10/12/22 10:28 AM   Result Value Ref Range    Color, UA Yellow     pH, UA 7     WBC, UA neg     Nitrite, UA neg     Protein, POC neg     Glucose, UA neg     Ketones, UA neg     Urobilinogen, UA 0.2     Bilirubin, POC neg     Blood, UA neg     Clarity, UA Clear     Spec Grav UA 1.020    POCT Bladder Scan    Collection Time: 10/12/22 10:53 AM   Result Value Ref Range    POC Residual Urine Volume 5 0 - 100 mL         ASSESSMENT   1. History of prostate cancer  POCT URINE DIPSTICK WITHOUT MICROSCOPE    POCT Bladder Scan    COVID-19 Routine Screening      2. History of bladder cancer  Urine culture    Cytology, Urine    COVID-19 Routine Screening    Case Request Operating Room: CYSTOSCOPY      3. Other stricture of bulbous urethra in male  COVID-19 Routine Screening    Case Request Operating Room: CYSTOSCOPY      4. Kidney stones            Plan    Extensive review of interim medical record including emergency department visit, correlating CT scans, and my acute intervention intraoperatively at time of his  hiatal hernia repair De Leon catheter placement.  We also reviewed his interim screening KUB is for history of stones in comparison to his updated CT imaging, as summarized above.    In regards to his kidney stones, he has 1 small stable nonobstructing stone which is not distinctly visible on x-ray, but no other stones present on CT scan a few weeks ago, and no stone episodes in the interim from our last evaluation.  Reviewed basic stone prevention recommendations will continue screening KUB prior to annual visit as a screening measure.    In regards to his history of prostate cancer, PSA on file stable from earlier this year, January 2022, and reviewed at this point this far out from treatment, PSA is annual out to 10 years post treatment, and due through 2024.    Most concerning is his history of bladder cancer without any updated cystoscopic surveillance as he did cancel it last year understanding and accepting the risks noting that he was feeling well.  We did discuss the cystoscopy would also evaluate current status of stricture, which had been recurrent, difficult to manage given radiation.  However had been passively dilated impassable with last procedures.  However, given the inability to pass De Leon catheter, including 14 Japanese silicone, at time of recent surgery, concern for stricture recurrence or progression.  I was able to pass a 16 Councill tip catheter over a guidewire without dilation so we know it is at least 16 Japanese in caliber, and he has no significant obstructive voiding symptoms both pre or postop from this.  While it may not be clinically significant or need to be further managed beyond this, we did discuss cystoscopic evaluation for his bladder cancer and routine surveillance to monitor for any recurrence      Last positive pathology was in March of 2019, with bladder biopsies 3 months later negative for malignancy only noting chronic inflammation.  His last cystoscopic surveillance however  was in January of 2021, so he is almost 2 years overdue for routine surveillance cystoscopy.  I did recommend proceeding both from the standpoint bladder cancer surveillance as well as re-evaluating stricture after recent need for intraoperative De Leon catheter placement he is now agreeable.  Will send a urine cytology today as well as urine culture in advance of this, and elected to proceed on 10/25/22.    He has intermittently taken Flomax in the past for obstructive voiding symptoms given radiated prostate with some mild continued obstruction, stricture, etc..  And more often than not was medication free only taking it for brief period surrounding lower tract instrumentation for concerns of difficulty voiding right after cystoscopy or catheterization.  He did understand he was going to have a catheter, albeit did not expect to be difficult, at time of his recent surgery, and tried to take Flomax a few days prior but had significant side effects.  Unsure if it was just residual from his volvulus in ER presentation etc. as he has been previously able to tolerate it, but given this concern, will hold at this time especially as he reports good urinary quality of life and emptying.  He does have some on hand which if he needs it post cystoscopy he will consider.    Cysto 10/25/22 at [facility]. Ucx ucytol today      Total time spent in/on encounter today, including face to face time with patient, counseling, medical record review, interpretation of tests/results, , and treatment plan coordination: 40 minutes

## 2022-10-12 NOTE — PROGRESS NOTES
[facility]    Edwin Sepulveda III is a 67 y.o. male who presents for f/u of history bladder ca, prostate ca (XRT), and urethral stricture, and kidney stones     Hx XRT for CaP in 2014, Hx kidney stones, with lo grade noninvasive bladder cancer found 3/2019 L lat wall, after which he had retention and subsequent mmc instillation with difficult gr. Cysto 7/19 found narrow bulbar urethral stricture just distal to the sphincter dilated at time of reTURBT 7/2019 of which path showed necrosis, inflammation, chronic follicular cystitis and mild reactive atypia consistent with radiation change. Biopsies 11/14/19 were negative.   Has had negative surveillance cystos since 7/20 and 1/21, able to passively dilate stricture w/ scope, taking 5d flomax after and otherwise no  meds. Unobstructed uroflow in oct 2020. R renal colic July 2020 and scan noted 3 mm stone in the dependent portion of the bladder with mild R hydroureteroneprhosis. Additional 2 mm stone at the mouth of the left UVJ. On review punctate R/L stone as well as larger 6-7mm L lower pole renal calculus remaining  Symptomatic passage of L 6-7mm stone obstructing in prox ureter on 1/24/21 (few weeks after cysto, at which time stable on KUB) and stone extraction URS on 2/12/21 during which Bladder negative, stricture passable with cystoscope. Cysto.stent removal on 3/2/21. Uroflow 7/21 with inadequate voided volume, but PVR 0c  Stone analysis:  100 % calcium oxalate monohydrate    Last seen via VV 2/21/22 noting PSA 0.33 on 1/26/22, stable from 0.28 in 10/21 and 0.41 in 7/19  Last imaging on file is July 2021 at time of attempted uroflow noting US concern for 5mm L midpole stone, but KUB negative  No interim stone episodes. Urinating going well. Stream is good. Bladder doesn't hold as much as it used to. Goes when feels urge and is ok. No UUI. Can sleep 4-5 hrs before getting up. Feels full at 200cc when has measured output. Feels like he is  "emptying. PVR 0cc in July    Planned annual cystoscopy which would also reeval stricture, and briefly discussed self calibration CIC given management difficulty in irradiated tissue.   He then cancelled noting he was feeling well and didn't want to proceed.    Most recently he was undergoing lap/robotic hiatal hernia repair with Dr Mcmahan, and gr would not pass and I was called into OR on 9/30/22 and could not pass gr either but was able to do so over a guidewire.   His gr was removed 1 week ago and he returns today for follow up  AUA SS 2/1 (1: freq, sleeping). Went back on flomax preop noting need for gr but was unable to tolerate - teeth hurt, hearing shut down/tinnitus, felt hoarse  Above hiatal hernia repair bc he had ER presentation for volvulus.  Wasn't having any voiding difficulty preop, and hasnt noticed a difference postop  No interim hematuria, stone episodes, flank pain  CT 9/21/22 from er for volvulus reviewed noting 4mm mid to upper pole stone. Not distinctly seen on KUB same day nor KUB 4/2022  Udip negative, PVR 5cc      Focused Physical Exam:    Vitals:    10/12/22 1028   BP: 123/78   Pulse: 62   Resp: 18     Body mass index is 25.7 kg/m². Weight: 78.9 kg (174 lb) Height: 5' 9" (175.3 cm)     Abdomen: Soft, non-tender, nondistended, no CVA tenderness      Recent Results (from the past 336 hour(s))   Comprehensive Metabolic Panel (CMP)    Collection Time: 10/02/22  4:37 AM   Result Value Ref Range    Sodium 140 136 - 145 mmol/L    Potassium 3.9 3.5 - 5.1 mmol/L    Chloride 106 95 - 110 mmol/L    CO2 25 23 - 29 mmol/L    Glucose 101 70 - 110 mg/dL    BUN 12 8 - 23 mg/dL    Creatinine 0.8 0.5 - 1.4 mg/dL    Calcium 8.8 8.7 - 10.5 mg/dL    Total Protein 6.0 6.0 - 8.4 g/dL    Albumin 3.4 (L) 3.5 - 5.2 g/dL    Total Bilirubin 0.6 0.1 - 1.0 mg/dL    Alkaline Phosphatase 52 (L) 55 - 135 U/L    AST 66 (H) 10 - 40 U/L    ALT 77 (H) 10 - 44 U/L    Anion Gap 9 8 - 16 mmol/L    eGFR >60 >60 " mL/min/1.73 m^2   CBC with Automated Differential    Collection Time: 10/02/22  4:37 AM   Result Value Ref Range    WBC 8.49 3.90 - 12.70 K/uL    RBC 4.31 (L) 4.60 - 6.20 M/uL    Hemoglobin 13.0 (L) 14.0 - 18.0 g/dL    Hematocrit 39.0 (L) 40.0 - 54.0 %    MCV 91 82 - 98 fL    MCH 30.2 27.0 - 31.0 pg    MCHC 33.3 32.0 - 36.0 g/dL    RDW 13.0 11.5 - 14.5 %    Platelets 146 (L) 150 - 450 K/uL    MPV 10.8 9.2 - 12.9 fL    Immature Granulocytes 0.4 0.0 - 0.5 %    Gran # (ANC) 6.4 1.8 - 7.7 K/uL    Immature Grans (Abs) 0.03 0.00 - 0.04 K/uL    Lymph # 1.2 1.0 - 4.8 K/uL    Mono # 0.9 0.3 - 1.0 K/uL    Eos # 0.0 0.0 - 0.5 K/uL    Baso # 0.02 0.00 - 0.20 K/uL    nRBC 0 0 /100 WBC    Gran % 75.0 (H) 38.0 - 73.0 %    Lymph % 13.8 (L) 18.0 - 48.0 %    Mono % 10.2 4.0 - 15.0 %    Eosinophil % 0.4 0.0 - 8.0 %    Basophil % 0.2 0.0 - 1.9 %    Differential Method Automated    POCT URINE DIPSTICK WITHOUT MICROSCOPE    Collection Time: 10/12/22 10:28 AM   Result Value Ref Range    Color, UA Yellow     pH, UA 7     WBC, UA neg     Nitrite, UA neg     Protein, POC neg     Glucose, UA neg     Ketones, UA neg     Urobilinogen, UA 0.2     Bilirubin, POC neg     Blood, UA neg     Clarity, UA Clear     Spec Grav UA 1.020    POCT Bladder Scan    Collection Time: 10/12/22 10:53 AM   Result Value Ref Range    POC Residual Urine Volume 5 0 - 100 mL         ASSESSMENT   1. History of prostate cancer  POCT URINE DIPSTICK WITHOUT MICROSCOPE    POCT Bladder Scan    COVID-19 Routine Screening      2. History of bladder cancer  Urine culture    Cytology, Urine    COVID-19 Routine Screening    Case Request Operating Room: CYSTOSCOPY      3. Other stricture of bulbous urethra in male  COVID-19 Routine Screening    Case Request Operating Room: CYSTOSCOPY      4. Kidney stones            Plan    Extensive review of interim medical record including emergency department visit, correlating CT scans, and my acute intervention intraoperatively at time of his  hiatal hernia repair De Leon catheter placement.  We also reviewed his interim screening KUB is for history of stones in comparison to his updated CT imaging, as summarized above.    In regards to his kidney stones, he has 1 small stable nonobstructing stone which is not distinctly visible on x-ray, but no other stones present on CT scan a few weeks ago, and no stone episodes in the interim from our last evaluation.  Reviewed basic stone prevention recommendations will continue screening KUB prior to annual visit as a screening measure.    In regards to his history of prostate cancer, PSA on file stable from earlier this year, January 2022, and reviewed at this point this far out from treatment, PSA is annual out to 10 years post treatment, and due through 2024.    Most concerning is his history of bladder cancer without any updated cystoscopic surveillance as he did cancel it last year understanding and accepting the risks noting that he was feeling well.  We did discuss the cystoscopy would also evaluate current status of stricture, which had been recurrent, difficult to manage given radiation.  However had been passively dilated impassable with last procedures.  However, given the inability to pass De Leon catheter, including 14 Swedish silicone, at time of recent surgery, concern for stricture recurrence or progression.  I was able to pass a 16 Councill tip catheter over a guidewire without dilation so we know it is at least 16 Swedish in caliber, and he has no significant obstructive voiding symptoms both pre or postop from this.  While it may not be clinically significant or need to be further managed beyond this, we did discuss cystoscopic evaluation for his bladder cancer and routine surveillance to monitor for any recurrence      Last positive pathology was in March of 2019, with bladder biopsies 3 months later negative for malignancy only noting chronic inflammation.  His last cystoscopic surveillance however  was in January of 2021, so he is almost 2 years overdue for routine surveillance cystoscopy.  I did recommend proceeding both from the standpoint bladder cancer surveillance as well as re-evaluating stricture after recent need for intraoperative De Leon catheter placement he is now agreeable.  Will send a urine cytology today as well as urine culture in advance of this, and elected to proceed on 10/25/22.    He has intermittently taken Flomax in the past for obstructive voiding symptoms given radiated prostate with some mild continued obstruction, stricture, etc..  And more often than not was medication free only taking it for brief period surrounding lower tract instrumentation for concerns of difficulty voiding right after cystoscopy or catheterization.  He did understand he was going to have a catheter, albeit did not expect to be difficult, at time of his recent surgery, and tried to take Flomax a few days prior but had significant side effects.  Unsure if it was just residual from his volvulus in ER presentation etc. as he has been previously able to tolerate it, but given this concern, will hold at this time especially as he reports good urinary quality of life and emptying.  He does have some on hand which if he needs it post cystoscopy he will consider.    Cysto 10/25/22 at [facility]. Ucx ucytol today      Total time spent in/on encounter today, including face to face time with patient, counseling, medical record review, interpretation of tests/results, , and treatment plan coordination: 40 minutes

## 2022-10-13 ENCOUNTER — OFFICE VISIT (OUTPATIENT)
Dept: SURGERY | Facility: CLINIC | Age: 67
End: 2022-10-13
Payer: MEDICARE

## 2022-10-13 ENCOUNTER — PATIENT MESSAGE (OUTPATIENT)
Dept: UROLOGY | Facility: CLINIC | Age: 67
End: 2022-10-13
Payer: MEDICARE

## 2022-10-13 VITALS
HEART RATE: 61 BPM | DIASTOLIC BLOOD PRESSURE: 66 MMHG | SYSTOLIC BLOOD PRESSURE: 137 MMHG | TEMPERATURE: 98 F | WEIGHT: 164 LBS | BODY MASS INDEX: 24.29 KG/M2 | HEIGHT: 69 IN

## 2022-10-13 DIAGNOSIS — Z98.890 POST-OPERATIVE STATE: Primary | ICD-10-CM

## 2022-10-13 LAB — BACTERIA UR CULT: NO GROWTH

## 2022-10-13 PROCEDURE — 1101F PT FALLS ASSESS-DOCD LE1/YR: CPT | Mod: CPTII,S$GLB,, | Performed by: STUDENT IN AN ORGANIZED HEALTH CARE EDUCATION/TRAINING PROGRAM

## 2022-10-13 PROCEDURE — 1126F PR PAIN SEVERITY QUANTIFIED, NO PAIN PRESENT: ICD-10-PCS | Mod: CPTII,S$GLB,, | Performed by: STUDENT IN AN ORGANIZED HEALTH CARE EDUCATION/TRAINING PROGRAM

## 2022-10-13 PROCEDURE — 1126F AMNT PAIN NOTED NONE PRSNT: CPT | Mod: CPTII,S$GLB,, | Performed by: STUDENT IN AN ORGANIZED HEALTH CARE EDUCATION/TRAINING PROGRAM

## 2022-10-13 PROCEDURE — 1159F PR MEDICATION LIST DOCUMENTED IN MEDICAL RECORD: ICD-10-PCS | Mod: CPTII,S$GLB,, | Performed by: STUDENT IN AN ORGANIZED HEALTH CARE EDUCATION/TRAINING PROGRAM

## 2022-10-13 PROCEDURE — 3288F PR FALLS RISK ASSESSMENT DOCUMENTED: ICD-10-PCS | Mod: CPTII,S$GLB,, | Performed by: STUDENT IN AN ORGANIZED HEALTH CARE EDUCATION/TRAINING PROGRAM

## 2022-10-13 PROCEDURE — 1101F PR PT FALLS ASSESS DOC 0-1 FALLS W/OUT INJ PAST YR: ICD-10-PCS | Mod: CPTII,S$GLB,, | Performed by: STUDENT IN AN ORGANIZED HEALTH CARE EDUCATION/TRAINING PROGRAM

## 2022-10-13 PROCEDURE — 3078F PR MOST RECENT DIASTOLIC BLOOD PRESSURE < 80 MM HG: ICD-10-PCS | Mod: CPTII,S$GLB,, | Performed by: STUDENT IN AN ORGANIZED HEALTH CARE EDUCATION/TRAINING PROGRAM

## 2022-10-13 PROCEDURE — 1159F MED LIST DOCD IN RCRD: CPT | Mod: CPTII,S$GLB,, | Performed by: STUDENT IN AN ORGANIZED HEALTH CARE EDUCATION/TRAINING PROGRAM

## 2022-10-13 PROCEDURE — 3078F DIAST BP <80 MM HG: CPT | Mod: CPTII,S$GLB,, | Performed by: STUDENT IN AN ORGANIZED HEALTH CARE EDUCATION/TRAINING PROGRAM

## 2022-10-13 PROCEDURE — 3075F PR MOST RECENT SYSTOLIC BLOOD PRESS GE 130-139MM HG: ICD-10-PCS | Mod: CPTII,S$GLB,, | Performed by: STUDENT IN AN ORGANIZED HEALTH CARE EDUCATION/TRAINING PROGRAM

## 2022-10-13 PROCEDURE — 99999 PR PBB SHADOW E&M-EST. PATIENT-LVL III: ICD-10-PCS | Mod: PBBFAC,,, | Performed by: STUDENT IN AN ORGANIZED HEALTH CARE EDUCATION/TRAINING PROGRAM

## 2022-10-13 PROCEDURE — 3075F SYST BP GE 130 - 139MM HG: CPT | Mod: CPTII,S$GLB,, | Performed by: STUDENT IN AN ORGANIZED HEALTH CARE EDUCATION/TRAINING PROGRAM

## 2022-10-13 PROCEDURE — 99999 PR PBB SHADOW E&M-EST. PATIENT-LVL III: CPT | Mod: PBBFAC,,, | Performed by: STUDENT IN AN ORGANIZED HEALTH CARE EDUCATION/TRAINING PROGRAM

## 2022-10-13 PROCEDURE — 3288F FALL RISK ASSESSMENT DOCD: CPT | Mod: CPTII,S$GLB,, | Performed by: STUDENT IN AN ORGANIZED HEALTH CARE EDUCATION/TRAINING PROGRAM

## 2022-10-13 PROCEDURE — 99024 POSTOP FOLLOW-UP VISIT: CPT | Mod: S$GLB,,, | Performed by: STUDENT IN AN ORGANIZED HEALTH CARE EDUCATION/TRAINING PROGRAM

## 2022-10-13 PROCEDURE — 99024 PR POST-OP FOLLOW-UP VISIT: ICD-10-PCS | Mod: S$GLB,,, | Performed by: STUDENT IN AN ORGANIZED HEALTH CARE EDUCATION/TRAINING PROGRAM

## 2022-10-13 RX ORDER — CIPROFLOXACIN 500 MG/1
500 TABLET ORAL 2 TIMES DAILY
Qty: 4 TABLET | Refills: 0 | Status: SHIPPED | OUTPATIENT
Start: 2022-10-13

## 2022-10-13 NOTE — PROGRESS NOTES
Postop note     Patient underwent hiatal hernia with toupee fundoplication.  Doing reasonably well.  Having some hiccups when he eats crackers.  He has been tolerating some solid food.      Incisions are healing well     Overall doing well.  Slowly advance diet.  Follow-up with me as needed.

## 2022-10-14 LAB
FINAL PATHOLOGIC DIAGNOSIS: ABNORMAL
Lab: ABNORMAL

## 2022-10-20 ENCOUNTER — PATIENT MESSAGE (OUTPATIENT)
Dept: SURGERY | Facility: CLINIC | Age: 67
End: 2022-10-20
Payer: MEDICARE

## 2022-10-22 ENCOUNTER — LAB VISIT (OUTPATIENT)
Dept: PRIMARY CARE CLINIC | Facility: CLINIC | Age: 67
End: 2022-10-22
Payer: MEDICARE

## 2022-10-22 DIAGNOSIS — Z85.46 HISTORY OF PROSTATE CANCER: ICD-10-CM

## 2022-10-22 DIAGNOSIS — N35.812 OTHER STRICTURE OF BULBOUS URETHRA IN MALE: ICD-10-CM

## 2022-10-22 DIAGNOSIS — Z85.51 HISTORY OF BLADDER CANCER: ICD-10-CM

## 2022-10-22 PROCEDURE — U0003 INFECTIOUS AGENT DETECTION BY NUCLEIC ACID (DNA OR RNA); SEVERE ACUTE RESPIRATORY SYNDROME CORONAVIRUS 2 (SARS-COV-2) (CORONAVIRUS DISEASE [COVID-19]), AMPLIFIED PROBE TECHNIQUE, MAKING USE OF HIGH THROUGHPUT TECHNOLOGIES AS DESCRIBED BY CMS-2020-01-R: HCPCS | Performed by: UROLOGY

## 2022-10-22 PROCEDURE — U0005 INFEC AGEN DETEC AMPLI PROBE: HCPCS | Performed by: UROLOGY

## 2022-10-24 LAB
SARS-COV-2 RNA RESP QL NAA+PROBE: NOT DETECTED
SARS-COV-2- CYCLE NUMBER: NORMAL

## 2022-10-25 ENCOUNTER — PATIENT MESSAGE (OUTPATIENT)
Dept: SURGERY | Facility: AMBULARY SURGERY CENTER | Age: 67
End: 2022-10-25
Payer: MEDICARE

## 2022-10-25 DIAGNOSIS — Z85.51 HISTORY OF BLADDER CANCER: Primary | ICD-10-CM

## 2022-10-27 ENCOUNTER — PATIENT MESSAGE (OUTPATIENT)
Dept: SURGERY | Facility: AMBULARY SURGERY CENTER | Age: 67
End: 2022-10-27
Payer: MEDICARE

## 2022-11-01 ENCOUNTER — PATIENT MESSAGE (OUTPATIENT)
Dept: SURGERY | Facility: AMBULARY SURGERY CENTER | Age: 67
End: 2022-11-01
Payer: MEDICARE

## 2022-11-01 ENCOUNTER — HOSPITAL ENCOUNTER (OUTPATIENT)
Facility: AMBULARY SURGERY CENTER | Age: 67
Discharge: HOME | End: 2022-11-01
Attending: UROLOGY | Admitting: UROLOGY
Payer: MEDICARE

## 2022-11-01 DIAGNOSIS — Z85.51 HISTORY OF BLADDER CANCER: ICD-10-CM

## 2022-11-01 PROCEDURE — 88112 CYTOPATH CELL ENHANCE TECH: CPT | Mod: 26,,, | Performed by: PATHOLOGY

## 2022-11-01 PROCEDURE — 52000 CYSTOURETHROSCOPY: CPT | Performed by: UROLOGY

## 2022-11-01 PROCEDURE — 52000 CYSTOURETHROSCOPY: CPT | Mod: ,,, | Performed by: UROLOGY

## 2022-11-01 PROCEDURE — 52000 PR CYSTOURETHROSCOPY: ICD-10-PCS | Mod: ,,, | Performed by: UROLOGY

## 2022-11-01 PROCEDURE — 88112 PR  CYTOPATH, CELL ENHANCE TECH: ICD-10-PCS | Mod: 26,,, | Performed by: PATHOLOGY

## 2022-11-01 RX ORDER — WATER 1 ML/ML
IRRIGANT IRRIGATION
Status: DISCONTINUED | OUTPATIENT
Start: 2022-11-01 | End: 2022-11-01 | Stop reason: HOSPADM

## 2022-11-01 RX ORDER — LIDOCAINE HYDROCHLORIDE 20 MG/ML
JELLY TOPICAL
Status: DISCONTINUED | OUTPATIENT
Start: 2022-11-01 | End: 2022-11-01 | Stop reason: HOSPADM

## 2022-11-01 RX ORDER — LIDOCAINE HYDROCHLORIDE 20 MG/ML
JELLY TOPICAL
Status: DISCONTINUED
Start: 2022-11-01 | End: 2022-11-01 | Stop reason: HOSPADM

## 2022-11-01 NOTE — INTERVAL H&P NOTE
The patient has been examined and the H&P has been reviewed:    Cytol atypical  Acceptable for asc      There are no hospital problems to display for this patient.

## 2022-11-01 NOTE — PLAN OF CARE
Discharge instructions given to pt/wife, verbalized understanding.  Refused fluids.  Voided x1 with c/o burning, but no bleeding.  Ambulating out with wife in no distress.

## 2022-11-02 VITALS
HEART RATE: 75 BPM | WEIGHT: 164 LBS | TEMPERATURE: 98 F | RESPIRATION RATE: 18 BRPM | OXYGEN SATURATION: 100 % | SYSTOLIC BLOOD PRESSURE: 148 MMHG | DIASTOLIC BLOOD PRESSURE: 79 MMHG | BODY MASS INDEX: 24.22 KG/M2

## 2022-11-02 NOTE — PROGRESS NOTES
Pt stated he had a little trouble urinating last night. Better now. Pt stated he left an email for dr. Oconnor. Pt instructed if he has trouble urinating again to call dr oconnor's office. Pt stated understanding.

## 2022-11-02 NOTE — OP NOTE
[facility] Operative Report/Brief Discharge Summary     Date: 11/1/22     Staff Surgeon: Kermit Beltran MD     Pre-Op Diagnosis: history of bladder cancer, history of XRT for prostate cancer, history of urethral stricture     Post-Op Diagnosis:   same     Procedure(s) Performed:   Cystoscopy, flexible      Specimen(s): urine cytology     Anesthesia: Local anesthesia, urojet     Findings:   Narrowing within prostatic urethra narrowing within prostatic urethra within radiated lateral lobe tissue causing resistance of passing scope which was able to pass through ultimately without any noted strictures distal or proximal, with open bladder neck.  Punctate area of erythema on posterior wall, and well-healed resection site scar left lower lateral near trigone and ureteral orifice without recurrence. Hypervascularity/radiation changes in prostatic urethra and at bladder base     Estimated Blood Loss: none     Drains: none     Complications: none     Indications for procedure:  65-year-old man with history of external beam radiation for prostate cancer in 2014, and then low-grade bladder cancer in 2019 of which there was some traumatic catheterization and mitomycin-C installation yielding ultimate findings of urethral stricture, which was largely stable liver cystoscopic surveillance, being dilated to pass scope in the OR for bladder biopsy in late 2019 which was just chronic inflammation, and was then passable in 2020 at time of surveillance.  As he always had some mild voiding difficulty after cystoscopic surveillance, he deferred follow-up noting that he was voiding well and doing well, until he recently reestablished care by emergent intraoperative consultation to place De Leon catheter over guidewire.  Since the De Leon catheter has been removed he has been voiding well.  He is overdue for cystoscopic surveillance which I recommended for his bladder cancer surveillance, as well as to evaluate a stricture recurrence  which he presents for today.  Cytology from clinic visit recently had atypia, but was also just after lower tract instrumentation and De Leon catheterization.     Procedure in detail:  After informed consent, the patient was prepped and drapped in standard cystoscopic fashion and 2% xylocaine jelly was instilled into the urethra. A flexible cystoscope was passed into the bladder via the urethra .     Anterior urethra without any distinct lesions or narrowings, and no noted stricture at level of sphincter but perhaps subtle mild narrowing.  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      The bladder was then systematically inspected. Bladder neck demonstrated good coaptation. In area of previous tumor management and resection, on the left lateral wall just posterior lateral to the ureteral orifice and trigone, there was no recurrence.  Was a punctate erythematous lesion on the posterior wall near the dome without any papillary changes or significant concerns. Ureteral orifice unobstructed.     The rest of his bladder mucosa appeared grossly normal with the exception of mild trabeculations and signs of early radiation cystitis. Bilateral ureteral orifices seen in their orthotopic position on the trigone bilaterally.     Pictures of urethral and bladder abnormalities  captured and placed on patient's chart. He tolerated the procedure well with no complications.      Disposition:   No recurrence of bladder tumor.  Subtle punctate erythematous finding.  History of radiation, mitomycin, and chronic inflammation on biopsy.  Set new urine cytology.  Noted need for annual cystoscopic surveillance.  Advised given noted findings above, recent  difficult De Leon catheterization, the subjectively urinating well, advised that he return in 3 months with his next PSA is due, at this time annually, for nurse visit for uroflow to assess for any obstructive voiding pattern concerning for stricture recurrence.  If he is doing well at that time, can follow-up at 1 year to plan annual cystoscopic surveillance.  Given his history of difficulty voiding after cystoscopic procedures, he usually takes a few nights of Flomax before discontinuing again, and advised to do so.  Prophylactic antibiotics prescribed in advance.     Discharge home today status post uncomplicated procedure as above  Diet - resume home diet (with stone prevention recs above)  Follow up: 3 mos psa and uroflow, 1 yr md  Instructions: drink plenty of water, may see blood in urine, take abx as directed, resume nightly Flomax a few nights - consider remaining on vs restarting  Meds:     Medication List        CONTINUE taking these medications      ciprofloxacin HCl 500 MG tablet  Commonly known as: CIPRO  Take 1 tablet (500 mg total) by mouth 2 (two) times daily.     FLOMAX ORAL            STOP taking these medications      hydrocodone-apap 7.5-325 MG/15 ML oral solution  Commonly known as: HYCET     ondansetron 4 MG Tbdl  Commonly known as: ZOFRAN-ODT     pantoprazole 40 MG tablet  Commonly known as: PROTONIX     promethazine 25 MG tablet  Commonly known as: PHENERGAN

## 2022-11-03 LAB
FINAL PATHOLOGIC DIAGNOSIS: NORMAL
Lab: NORMAL

## 2023-02-01 ENCOUNTER — CLINICAL SUPPORT (OUTPATIENT)
Dept: UROLOGY | Facility: CLINIC | Age: 68
End: 2023-02-01
Payer: MEDICARE

## 2023-02-01 DIAGNOSIS — N40.0 BPH WITHOUT URINARY OBSTRUCTION: ICD-10-CM

## 2023-02-01 DIAGNOSIS — N20.0 KIDNEY STONES: Primary | ICD-10-CM

## 2023-02-01 LAB — POC RESIDUAL URINE VOLUME: 22 ML (ref 0–100)

## 2023-02-01 PROCEDURE — 99499 UNLISTED E&M SERVICE: CPT | Mod: HCNC,S$GLB,, | Performed by: UROLOGY

## 2023-02-01 PROCEDURE — 51741 PR UROFLOWMETRY, COMPLEX: ICD-10-PCS | Mod: 26,HCNC,S$GLB, | Performed by: UROLOGY

## 2023-02-01 PROCEDURE — 51741 ELECTRO-UROFLOWMETRY FIRST: CPT | Mod: 26,HCNC,S$GLB, | Performed by: UROLOGY

## 2023-02-01 PROCEDURE — 51798 US URINE CAPACITY MEASURE: CPT | Mod: HCNC,S$GLB,, | Performed by: UROLOGY

## 2023-02-01 PROCEDURE — 99499 NO LOS: ICD-10-PCS | Mod: HCNC,S$GLB,, | Performed by: UROLOGY

## 2023-02-01 PROCEDURE — 51798 POCT BLADDER SCAN: ICD-10-PCS | Mod: HCNC,S$GLB,, | Performed by: UROLOGY

## 2023-02-01 NOTE — PROGRESS NOTES
AUASS:  Incomplete emptying- 0  Frequency- 1  Intermittency- 0  Urgency- 1  Weak Stream- 0  Straining- 0  Sleeping- 1  Total-  3  QOL- 1  PVR- 22 ml    Uroflow results  Voiding time: 74.5s,   Flow time: 43.1s,   TTP flow: 12.4s,   Peak flowrate: 9.5 mL/s,   Average flowrate: 4.9mL/s,   Intervals: 6,   Voided volume: 212 mL,    Pattern of curve: to be determined by physician.

## 2023-02-08 ENCOUNTER — LAB VISIT (OUTPATIENT)
Dept: LAB | Facility: HOSPITAL | Age: 68
End: 2023-02-08
Attending: UROLOGY
Payer: MEDICARE

## 2023-02-08 DIAGNOSIS — Z85.46 HISTORY OF PROSTATE CANCER: ICD-10-CM

## 2023-02-08 LAB — COMPLEXED PSA SERPL-MCNC: 0.28 NG/ML (ref 0–4)

## 2023-02-08 PROCEDURE — 36415 COLL VENOUS BLD VENIPUNCTURE: CPT | Mod: HCNC | Performed by: UROLOGY

## 2023-02-08 PROCEDURE — 84153 ASSAY OF PSA TOTAL: CPT | Mod: HCNC | Performed by: UROLOGY

## 2023-02-09 DIAGNOSIS — Z00.00 ENCOUNTER FOR MEDICARE ANNUAL WELLNESS EXAM: ICD-10-CM

## 2023-02-26 ENCOUNTER — PATIENT MESSAGE (OUTPATIENT)
Dept: UROLOGY | Facility: CLINIC | Age: 68
End: 2023-02-26
Payer: MEDICARE

## 2023-02-26 NOTE — PROGRESS NOTES
Uroflow curve pattern review from 2/1  Low peaking though generates voiding curve trailing off only with intermittency to empty  No concern for stricture recurrence  Minimal LUTS  At baseline for flow - reasonable for him  FU as planned

## 2023-08-01 ENCOUNTER — TELEPHONE (OUTPATIENT)
Dept: FAMILY MEDICINE | Facility: CLINIC | Age: 68
End: 2023-08-01
Payer: MEDICARE

## 2023-09-13 ENCOUNTER — PATIENT OUTREACH (OUTPATIENT)
Dept: ADMINISTRATIVE | Facility: HOSPITAL | Age: 68
End: 2023-09-13
Payer: MEDICARE

## 2023-09-13 ENCOUNTER — PATIENT MESSAGE (OUTPATIENT)
Dept: ADMINISTRATIVE | Facility: HOSPITAL | Age: 68
End: 2023-09-13
Payer: MEDICARE

## 2023-09-13 NOTE — PROGRESS NOTES
Population Health Chart Review & Patient Outreach Details:     Reason for Outreach Encounter:     [x]  Non-Compliant Report   []  Payor Report ([employer], PHN, BCBS, MSSP, MCIP, UHC, etc.)   []  Pre-Visit Chart Review     Updates Requested / Reviewed:     [x]  Care Everywhere    []     []  External Sources ([facility], [facility], DIS, etc.)   [x]  Care Team Updated    Patient Outreach Method:    [x]  Telephone Outreach Completed   [] Successful   [x] Left Voicemail   [] Unable to Contact (wrong number, no voicemail)  [x]  [facility] Outreach Sent  []  Letter Outreach Mailed  []  Fax Sent for External Records  []  External Records Upload    Health Maintenance Topics Addressed and Outreach Outcomes / Actions Taken:        []      Breast Cancer Screening []  Mammo Scheduled      []  External Records Requested     []  Added Reminder to Complete to Upcoming Primary Care Appt Notes     []  Patient Declined     []  Patient Will Call Back to Schedule     []  Patient Will Schedule with External Provider / Order Routed if Applicable             []       Cervical Cancer Screening []  Pap Scheduled      []  External Records Requested     []  Added Reminder to Complete to Upcoming Primary Care Appt Notes     []  Patient Declined     []  Patient Will Call Back to Schedule     []  Patient Will Schedule with External Provider               []          Colorectal Cancer Screening []  Colonoscopy Case Request or Referral Placed     []  External Records Requested     []  Added Reminder to Complete to Upcoming Primary Care Appt Notes     []  Patient Declined     []  Patient Will Call Back to Schedule     []  Patient Will Schedule with External Provider     []  Fit Kit Mailed (add the SmartPhrase under additional notes)     []  Reminded Patient to Complete Home Test             []      Diabetic Eye Exam []  Eye Camera Scheduled or Optometry Referral Placed     []  External Records Requested     []  Added Reminder to Complete to  Upcoming Primary Care Appt Notes     []  Patient Declined     []  Patient Will Call Back to Schedule     []  Patient Will Schedule with External Provider             []      Blood Pressure Control []  Primary Care Follow Up Visit Scheduled     []  Remote Blood Pressure Reading Captured     []  Added Reminder to Complete to Upcoming Primary Care Appt Notes     []  Patient Declined     []  Patient Will Call Back / Patient Will Send Portal Message with Reading     []  Patient Will Call Back to Schedule Provider Visit             []       HbA1c & Other Labs []  Lab Appt Scheduled for Due Labs     []  Primary Care Follow Up Visit Scheduled      []  Reminded Patient to Complete Home Test     []  Added Reminder to Complete to Upcoming Primary Care Appt Notes     []  Patient Declined     []  Patient Will Call Back to Schedule     []  Patient Will Schedule with External Provider / Order Routed if Applicable           [x]    Schedule Primary Care Appt []  Primary Care Appt Scheduled     []  Patient Declined     []  Patient Will Call Back to Schedule     []  Pt Established with External Provider & Updated Care Team             []      Medication Adherence []  Primary Care Appointment Scheduled     []  Added Reminder to Upcoming Primary Care Appt Notes     []  Patient Reminded to  Prescription     []  Patient Declined, Provider Notified if Needed     []  Sent Provider Message to Review and/or Add Exclusion to Problem List             []      Osteoporosis Screening []  DXA Appointment Scheduled     []  External Records Requested     []  Added Reminder to Complete to Upcoming Primary Care Appt Notes     []  Patient Declined     []  Patient Will Call Back to Schedule     []  Patient Will Schedule with External Provider / Order Routed if Applicable     Additional Care Coordinator Notes:     PCP report. Care team updated.    Further Action Needed If Patient Returns Outreach:

## 2023-09-20 ENCOUNTER — PATIENT OUTREACH (OUTPATIENT)
Dept: ADMINISTRATIVE | Facility: HOSPITAL | Age: 68
End: 2023-09-20
Payer: MEDICARE

## 2023-09-20 NOTE — LETTER
September 20, 2023    Edwin Sepulveda III  [address] [facility] [address]             [facility] [address] [place] [address]  [phone] (phone)  [phone] (fax)  Dear Mr. Sepulveda,    [facility] is committed to your overall health.  We would like to assist you in choosing a PCP.  It is noted Francis Teague MD was listed as your PCP. Appointments with our Nurse Practitioner and Physician Assistant team members are reserved for illness, follow up, and for more immediate needs.      Please call [phone] to schedule a routine checkup and establish care with one of our other providers. You will still need to meet with a physician member of the team to formally establish care.  Profiles for each provider including education, training and background can be found on the [facility] website. This may help you choose the provider who is the best fit for you.      We look forward to joining you in your healthcare journey. Please let us know if we can provide additional support.     Thank you for choosing [facility] for your care.           Sincerely,  [facility]

## 2023-09-20 NOTE — PROGRESS NOTES
"Attempted to outreach patient regarding PCP visit via "[employer]". No response after a week. Now sending outreach via mail out letter.    "

## (undated) DEVICE — CANISTER SUCTION MEDI-VAC 12L

## (undated) DEVICE — SPONGE GAUZE 16PLY 4X4

## (undated) DEVICE — SEE MEDLINE ITEM 157116

## (undated) DEVICE — SEE MEDLINE ITEM 152487

## (undated) DEVICE — ELECTRODE REM PLYHSV RETURN 9

## (undated) DEVICE — SEE MEDLINE ITEM 152466

## (undated) DEVICE — SET TUBE PNEUMOCLEAR SE HI FLO

## (undated) DEVICE — GLOVE SURGEONS ULTRA TOUCH 6.5

## (undated) DEVICE — SOL NACL IRR 1000ML BTL

## (undated) DEVICE — FIBER LASER HOLMIUM 365 MICRON

## (undated) DEVICE — GLOVE BIOGEL PIMICRO INDIC 6.5

## (undated) DEVICE — GUIDEWIRE AMPLATZ .035X145 STR

## (undated) DEVICE — SHEET DRAPE MEDIUM

## (undated) DEVICE — SLEEVE SCD EXPRESS KNEE MEDIUM

## (undated) DEVICE — PACK CUSTOM UNIV BASIN SLI

## (undated) DEVICE — DRAPE ABDOMINAL TIBURON 14X11

## (undated) DEVICE — SYR ONLY LUER LOCK 20CC

## (undated) DEVICE — TRAY DRY SPONGE SCRUB W FOAM

## (undated) DEVICE — SEE MEDLINE ITEM 157185

## (undated) DEVICE — WIRE GD LUB STD 3CM .035 150CM

## (undated) DEVICE — GLOVE BIOGEL PI MICRO INDIC 6

## (undated) DEVICE — SYR 50CC LL

## (undated) DEVICE — DRAPE MINOR PROCEDURE

## (undated) DEVICE — STRAP OR TABLE 5IN X 72IN

## (undated) DEVICE — TROCAR ENDO Z THREAD KII 5X100

## (undated) DEVICE — SEE L#95700

## (undated) DEVICE — GLOVE SURG ULTRA TOUCH 7

## (undated) DEVICE — ELECTRODE RESECTION BUTTON LRG

## (undated) DEVICE — DRAPE MINOR FEN 98X77X121IN

## (undated) DEVICE — SCRUB 10% POVIDONE IODINE 4OZ

## (undated) DEVICE — CATH COUDE 22F 5CC W/STAT LOC

## (undated) DEVICE — SYS EASY CATCHER FLUID DRN

## (undated) DEVICE — SUT MONOCRYL 4-0 PS-2

## (undated) DEVICE — SUT 0 VICRYL / UR6 (J603)

## (undated) DEVICE — SOL IRR NACL .9% 3000ML

## (undated) DEVICE — COVER TIP CURVED SCISSORS XI

## (undated) DEVICE — BAG LINGEMAN DRAIN UROLOGY

## (undated) DEVICE — SHEATH URET FLEXOR 12FR 45CM

## (undated) DEVICE — SEE MEDLINE ITEM 157117

## (undated) DEVICE — SEALER VESSEL EXTEND

## (undated) DEVICE — SOL WATER STRL IRR 1000ML

## (undated) DEVICE — DEVICE ANC SW STAT FOLEY 6-24

## (undated) DEVICE — GAUZE SPONGE 4X4 12PLY

## (undated) DEVICE — BAG URINARY DRN 2000ML

## (undated) DEVICE — GLOVE PI ULTRA TOUCH G SURGEON

## (undated) DEVICE — SOL PVP-I SCRUB 7.5% 4OZ

## (undated) DEVICE — SET IRR URLGY 2LINE UNIV SPIKE

## (undated) DEVICE — GLOVE SURG ULTRA TOUCH 7.5

## (undated) DEVICE — GAUZE VASELINE PETRO 3X9

## (undated) DEVICE — DEVICE SYNCHROSEAL DA VINCI

## (undated) DEVICE — SOL NACL IRR 3000ML

## (undated) DEVICE — GLOVE SURG ULTRA TOUCH 6

## (undated) DEVICE — WATER STERILE INJ 500ML BAG

## (undated) DEVICE — UNDERPAD ULTRASORB 300LB 30X36

## (undated) DEVICE — JELLY SURGILUBE LUBE TUBE 2OZ

## (undated) DEVICE — SET CYSTO IRRIGATION UNIV SPIK

## (undated) DEVICE — Device

## (undated) DEVICE — SYR LUER LOCK STERILE 10ML

## (undated) DEVICE — CATH FOLEY 16F COUNCIL STA LOC

## (undated) DEVICE — LOOP CUTTING 24F

## (undated) DEVICE — SEE MEDLINE ITEM 157181

## (undated) DEVICE — LOOP CUTTING RESECT 12 D 24F

## (undated) DEVICE — GUIDEWIRE AMPLATZ .035X260

## (undated) DEVICE — SYR SALINE FLSH PRFL STRL 10ML

## (undated) DEVICE — GUIDE WIRE MOTION .035 X 150CM

## (undated) DEVICE — APPLICATOR CHLORAPREP ORN 26ML

## (undated) DEVICE — SYR 50ML CATH TIP

## (undated) DEVICE — SOL 9P NACL IRR PIC IL

## (undated) DEVICE — IRRIGATOR ENDOWRIST XI SUCTION

## (undated) DEVICE — LUBRICANT SURGILUBE 2 OZ

## (undated) DEVICE — DRAIN PENROSE XRAY 18 X 1/4 ST

## (undated) DEVICE — SUT ETHIBOND EX 0SH 30IN GR

## (undated) DEVICE — DRAPE MEDIUM SHEET 40X70IN

## (undated) DEVICE — SPONGE SUPER KERLIX 6X6.75IN

## (undated) DEVICE — DEVICE STABILIZATION STAT LOCK

## (undated) DEVICE — ADHESIVE DERMABOND ADVANCED

## (undated) DEVICE — SEE MEDLINE ITEM 146313

## (undated) DEVICE — SOL CLEARIFY VISUALIZATION LAP

## (undated) DEVICE — SET Y-TYPE TUR IRRIGATION

## (undated) DEVICE — STENT SET URETERAL 6X26CM
Type: IMPLANTABLE DEVICE | Site: URETER | Status: NON-FUNCTIONAL
Removed: 2021-01-28

## (undated) DEVICE — GOWN POLY REINF BRTH SLV XL

## (undated) DEVICE — DRAPE COLUMN DAVINCI XI

## (undated) DEVICE — AMPLATZER.035 SUPER STIFF INT

## (undated) DEVICE — ELECTRODE BUGBEE FULGURATING

## (undated) DEVICE — SOL IRRI STRL WATER 1000ML

## (undated) DEVICE — SUT BLU GS-22 0 3.5M 23CM 9IN

## (undated) DEVICE — SCRUB HIBICLENS 4% CHG 4OZ

## (undated) DEVICE — SOL NS 1000CC

## (undated) DEVICE — CONTAINER SPECIMEN STRL 4OZ

## (undated) DEVICE — GOWN B1 X-LG X-LONG

## (undated) DEVICE — SLEEVE SCD EXPRESS CALF MEDIUM

## (undated) DEVICE — SYS LABEL CORRECT MED

## (undated) DEVICE — CATH POLLACK OPEN-END FLEXI-TI

## (undated) DEVICE — SYR SLIP TIP 10ML SHIELD

## (undated) DEVICE — ADAPTER STOPCOCK FEMALE LL

## (undated) DEVICE — EXTRACTOR STONE 4WR NIT 2.2F 1

## (undated) DEVICE — SOL STERILE WATER INJ 1000ML

## (undated) DEVICE — PACK BASIC

## (undated) DEVICE — STENT SET URETERAL 6X26CM
Type: IMPLANTABLE DEVICE | Site: URETER | Status: NON-FUNCTIONAL
Removed: 2021-02-11

## (undated) DEVICE — OBTURATOR BLADELESS 8MM XI CLR

## (undated) DEVICE — CLIPPER BLADE MOD 4406 (CAREF)

## (undated) DEVICE — DRAPE ARM DAVINCI XI

## (undated) DEVICE — SEAL UNIVERSAL 5MM-8MM XI

## (undated) DEVICE — SOL IRR NACL .9% 1000CC

## (undated) DEVICE — NDL SAFETY 21G X 1 1/2 ECLPSE

## (undated) DEVICE — SET CYSTO IRR DRP CHMBR 84IN

## (undated) DEVICE — SYR 10CC LUER LOCK

## (undated) DEVICE — TUBING ARTHRO IRR 4-LEAD

## (undated) DEVICE — CATH 16FR URETHRL RED RUB

## (undated) DEVICE — SOL ELECTROLUBE ANTI-STIC

## (undated) DEVICE — GOWN POLY REINF BRTH SLV LG

## (undated) DEVICE — TOWEL OR DISP STRL BLUE 4/PK

## (undated) DEVICE — LINER SUCTION 3000CC

## (undated) DEVICE — SEE MEDLINE ITEM 152651

## (undated) DEVICE — SOL IRR WATER STRL 3000 ML

## (undated) DEVICE — CATH URTRL OPEN END STR TIP 5F

## (undated) DEVICE — BLADE SURG CARBON STEEL SZ11